# Patient Record
Sex: FEMALE | Race: WHITE | Employment: OTHER | URBAN - NONMETROPOLITAN AREA
[De-identification: names, ages, dates, MRNs, and addresses within clinical notes are randomized per-mention and may not be internally consistent; named-entity substitution may affect disease eponyms.]

---

## 2017-01-05 ENCOUNTER — TELEPHONE (OUTPATIENT)
Dept: FAMILY MEDICINE CLINIC | Age: 62
End: 2017-01-05

## 2017-01-17 DIAGNOSIS — M81.0 POSTMENOPAUSAL OSTEOPOROSIS: ICD-10-CM

## 2017-01-17 RX ORDER — ALENDRONATE SODIUM 70 MG/1
70 TABLET ORAL
Qty: 12 TABLET | Refills: 3 | Status: SHIPPED | OUTPATIENT
Start: 2017-01-17 | End: 2017-11-28 | Stop reason: ALTCHOICE

## 2017-03-06 DIAGNOSIS — E03.9 ACQUIRED HYPOTHYROIDISM: Primary | ICD-10-CM

## 2017-03-06 RX ORDER — THYROID 60 MG
TABLET ORAL
Qty: 30 TABLET | Refills: 0 | Status: SHIPPED | OUTPATIENT
Start: 2017-03-06 | End: 2017-03-27 | Stop reason: SDUPTHER

## 2017-03-08 RX ORDER — LEVOTHYROXINE AND LIOTHYRONINE 19; 4.5 UG/1; UG/1
30 TABLET ORAL EVERY OTHER DAY
Qty: 32 TABLET | Refills: 0 | Status: SHIPPED | OUTPATIENT
Start: 2017-03-08 | End: 2017-03-27 | Stop reason: SDUPTHER

## 2017-03-23 ENCOUNTER — TELEPHONE (OUTPATIENT)
Dept: FAMILY MEDICINE CLINIC | Age: 62
End: 2017-03-23

## 2017-03-27 ENCOUNTER — TELEPHONE (OUTPATIENT)
Dept: FAMILY MEDICINE CLINIC | Age: 62
End: 2017-03-27

## 2017-03-27 DIAGNOSIS — E03.9 ACQUIRED HYPOTHYROIDISM: Primary | ICD-10-CM

## 2017-03-27 RX ORDER — LEVOTHYROXINE AND LIOTHYRONINE 38; 9 UG/1; UG/1
60 TABLET ORAL EVERY OTHER DAY
Qty: 30 TABLET | Refills: 1 | Status: SHIPPED | OUTPATIENT
Start: 2017-03-27 | End: 2017-10-09 | Stop reason: SDUPTHER

## 2017-03-27 RX ORDER — LEVOTHYROXINE AND LIOTHYRONINE 19; 4.5 UG/1; UG/1
30 TABLET ORAL EVERY OTHER DAY
Qty: 32 TABLET | Refills: 1 | Status: SHIPPED | OUTPATIENT
Start: 2017-03-27 | End: 2017-10-09 | Stop reason: SDUPTHER

## 2017-05-05 ENCOUNTER — TELEPHONE (OUTPATIENT)
Dept: FAMILY MEDICINE CLINIC | Age: 62
End: 2017-05-05

## 2017-06-26 ENCOUNTER — TELEPHONE (OUTPATIENT)
Dept: FAMILY MEDICINE CLINIC | Age: 62
End: 2017-06-26

## 2017-07-05 ENCOUNTER — TELEPHONE (OUTPATIENT)
Dept: FAMILY MEDICINE CLINIC | Age: 62
End: 2017-07-05

## 2017-07-06 ENCOUNTER — TELEPHONE (OUTPATIENT)
Dept: FAMILY MEDICINE CLINIC | Age: 62
End: 2017-07-06

## 2017-07-08 DIAGNOSIS — E03.9 ACQUIRED HYPOTHYROIDISM: Primary | ICD-10-CM

## 2017-07-10 ENCOUNTER — TELEPHONE (OUTPATIENT)
Dept: FAMILY MEDICINE CLINIC | Age: 62
End: 2017-07-10

## 2017-09-07 DIAGNOSIS — E03.9 ACQUIRED HYPOTHYROIDISM: ICD-10-CM

## 2017-09-07 RX ORDER — THYROID 30 MG/1
TABLET ORAL
Qty: 30 TABLET | Refills: 1 | OUTPATIENT
Start: 2017-09-07

## 2017-09-07 RX ORDER — THYROID 60 MG
TABLET ORAL
Qty: 30 TABLET | Refills: 1 | OUTPATIENT
Start: 2017-09-07

## 2017-09-14 DIAGNOSIS — E03.9 ACQUIRED HYPOTHYROIDISM: ICD-10-CM

## 2017-09-14 RX ORDER — THYROID 30 MG/1
TABLET ORAL
Qty: 30 TABLET | Refills: 1 | OUTPATIENT
Start: 2017-09-14

## 2017-09-14 RX ORDER — THYROID 60 MG
TABLET ORAL
Qty: 30 TABLET | Refills: 1 | OUTPATIENT
Start: 2017-09-14

## 2017-09-26 ENCOUNTER — TELEPHONE (OUTPATIENT)
Dept: ADMINISTRATIVE | Age: 62
End: 2017-09-26

## 2017-10-09 DIAGNOSIS — E03.9 ACQUIRED HYPOTHYROIDISM: ICD-10-CM

## 2017-10-09 RX ORDER — THYROID 60 MG
TABLET ORAL
Qty: 30 TABLET | Refills: 1 | Status: SHIPPED | OUTPATIENT
Start: 2017-10-09 | End: 2017-11-28 | Stop reason: SDUPTHER

## 2017-10-09 RX ORDER — THYROID 30 MG/1
TABLET ORAL
Qty: 30 TABLET | Refills: 1 | Status: SHIPPED | OUTPATIENT
Start: 2017-10-09 | End: 2017-11-28 | Stop reason: SDUPTHER

## 2017-10-18 ENCOUNTER — TELEPHONE (OUTPATIENT)
Dept: FAMILY MEDICINE CLINIC | Age: 62
End: 2017-10-18

## 2017-10-18 NOTE — TELEPHONE ENCOUNTER
Patient was supposed to take 60 mg Olin thyroid on Sunday, Tuesday, Thursday and Sat, and 30 mg on MWF.  Please inform patient though that I will not be prescribing any more medications without an appt

## 2017-11-28 ENCOUNTER — HOSPITAL ENCOUNTER (OUTPATIENT)
Age: 62
Discharge: HOME | End: 2017-11-28
Payer: COMMERCIAL

## 2017-11-28 ENCOUNTER — OFFICE VISIT (OUTPATIENT)
Dept: FAMILY MEDICINE CLINIC | Age: 62
End: 2017-11-28
Payer: COMMERCIAL

## 2017-11-28 VITALS
SYSTOLIC BLOOD PRESSURE: 102 MMHG | RESPIRATION RATE: 24 BRPM | BODY MASS INDEX: 20.76 KG/M2 | WEIGHT: 137 LBS | DIASTOLIC BLOOD PRESSURE: 72 MMHG | HEIGHT: 68 IN | HEART RATE: 72 BPM | TEMPERATURE: 98 F

## 2017-11-28 DIAGNOSIS — F33.42 RECURRENT MAJOR DEPRESSIVE EPISODES, IN FULL REMISSION (HCC): ICD-10-CM

## 2017-11-28 DIAGNOSIS — E03.9 ACQUIRED HYPOTHYROIDISM: Primary | ICD-10-CM

## 2017-11-28 DIAGNOSIS — J44.9 CHRONIC OBSTRUCTIVE PULMONARY DISEASE, UNSPECIFIED COPD TYPE (HCC): ICD-10-CM

## 2017-11-28 DIAGNOSIS — G89.29 CHRONIC RIGHT HIP PAIN: ICD-10-CM

## 2017-11-28 DIAGNOSIS — M25.551 CHRONIC RIGHT HIP PAIN: ICD-10-CM
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
## 2017-11-28 DIAGNOSIS — M81.0 POSTMENOPAUSAL OSTEOPOROSIS: ICD-10-CM

## 2017-11-28 DIAGNOSIS — M35.00 SICCA SYNDROME (HCC): ICD-10-CM
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
## 2017-11-28 LAB
T4 FREE: 0.67 NG/DL (ref 0.93–1.76)
TSH SERPL DL<=0.05 MIU/L-ACNC: 4.64 UIU/ML (ref 0.4–4.2)
VITAMIN D 25-HYDROXY: 36 NG/ML (ref 30–100)

## 2017-11-28 PROCEDURE — G8926 SPIRO NO PERF OR DOC: HCPCS | Performed by: NURSE PRACTITIONER

## 2017-11-28 PROCEDURE — 4005F PHARM THX FOR OP RXD: CPT | Performed by: NURSE PRACTITIONER

## 2017-11-28 PROCEDURE — 3014F SCREEN MAMMO DOC REV: CPT | Performed by: NURSE PRACTITIONER

## 2017-11-28 PROCEDURE — 3017F COLORECTAL CA SCREEN DOC REV: CPT | Performed by: NURSE PRACTITIONER

## 2017-11-28 PROCEDURE — G8484 FLU IMMUNIZE NO ADMIN: HCPCS | Performed by: NURSE PRACTITIONER

## 2017-11-28 PROCEDURE — 84439 ASSAY OF FREE THYROXINE: CPT

## 2017-11-28 PROCEDURE — G8427 DOCREV CUR MEDS BY ELIG CLIN: HCPCS | Performed by: NURSE PRACTITIONER

## 2017-11-28 PROCEDURE — 1036F TOBACCO NON-USER: CPT | Performed by: NURSE PRACTITIONER

## 2017-11-28 PROCEDURE — 84443 ASSAY THYROID STIM HORMONE: CPT

## 2017-11-28 PROCEDURE — G8420 CALC BMI NORM PARAMETERS: HCPCS | Performed by: NURSE PRACTITIONER

## 2017-11-28 PROCEDURE — 36415 COLL VENOUS BLD VENIPUNCTURE: CPT

## 2017-11-28 PROCEDURE — 3023F SPIROM DOC REV: CPT | Performed by: NURSE PRACTITIONER

## 2017-11-28 PROCEDURE — 99214 OFFICE O/P EST MOD 30 MIN: CPT | Performed by: NURSE PRACTITIONER

## 2017-11-28 PROCEDURE — 82306 VITAMIN D 25 HYDROXY: CPT

## 2017-11-28 RX ORDER — LEVOTHYROXINE AND LIOTHYRONINE 19; 4.5 UG/1; UG/1
TABLET ORAL
Qty: 30 TABLET | Refills: 6 | Status: SHIPPED | OUTPATIENT
Start: 2017-11-28 | End: 2018-01-11 | Stop reason: SDUPTHER

## 2017-11-28 RX ORDER — LANOLIN ALCOHOL/MO/W.PET/CERES
3 CREAM (GRAM) TOPICAL NIGHTLY PRN
COMMUNITY
End: 2018-08-20

## 2017-11-28 RX ORDER — ACETAMINOPHEN 500 MG
500 TABLET ORAL PRN
COMMUNITY

## 2017-11-28 RX ORDER — LEVOTHYROXINE AND LIOTHYRONINE 38; 9 UG/1; UG/1
TABLET ORAL
Qty: 30 TABLET | Refills: 6 | Status: SHIPPED | OUTPATIENT
Start: 2017-11-28 | End: 2018-01-11 | Stop reason: SDUPTHER

## 2017-11-28 ASSESSMENT — PATIENT HEALTH QUESTIONNAIRE - PHQ9
1. LITTLE INTEREST OR PLEASURE IN DOING THINGS: 0
SUM OF ALL RESPONSES TO PHQ9 QUESTIONS 1 & 2: 0
2. FEELING DOWN, DEPRESSED OR HOPELESS: 0
SUM OF ALL RESPONSES TO PHQ QUESTIONS 1-9: 0

## 2017-11-28 ASSESSMENT — ENCOUNTER SYMPTOMS
EYES NEGATIVE: 1
GASTROINTESTINAL NEGATIVE: 1
RESPIRATORY NEGATIVE: 1

## 2017-11-29 ENCOUNTER — TELEPHONE (OUTPATIENT)
Dept: FAMILY MEDICINE CLINIC | Age: 62
End: 2017-11-29

## 2017-11-29 DIAGNOSIS — E03.9 ACQUIRED HYPOTHYROIDISM: Primary | ICD-10-CM

## 2017-11-29 NOTE — TELEPHONE ENCOUNTER
Yes I see the association with the right hip and the order is for the right hip but as you scroll down on the actual referral it states the reason for exam... Naima Prows Naima Vincent Kaur left hip pain after fall. The order may need to be changed so there's no discrepancy at the facility where she will have this done. Thank you for clearing this up with me.  I will submit for the right hip

## 2017-11-29 NOTE — TELEPHONE ENCOUNTER
----- Message from Todd Bender, DO sent at 11/29/2017  7:28 AM EST -----  Please let pt know that thyroid may be a bit underactive, but I'd like to repeat labs again in 4 wks to confirm before changing doses. Non-fasting. Let me know if questions, thanks!   Vit d level normal. Thanks

## 2017-11-29 NOTE — TELEPHONE ENCOUNTER
Patient says she was talking to someone earlier-she wants to verify that her rx for her armor that she takes 30 mg mon, weds, and Friday. On tues, thurs, Saturday, Sunday she takes 60 mg. She doesn't know if that's what she is supposed to be doing. Is this correct?

## 2017-11-29 NOTE — TELEPHONE ENCOUNTER
When I look at my office encounter, the order is associated with right hip pain and the order is for the right hip.

## 2017-11-29 NOTE — TELEPHONE ENCOUNTER
Yes, once it's been scheduled it is then linked to the referral and you can no longer make any changes. The central scheduling team in [place], [place] scheduled the pt but no worries they have been contacted and its been clear that its for the right hip and to disregard the note of the left hip.  Thank you

## 2017-12-07 ENCOUNTER — HOSPITAL ENCOUNTER (OUTPATIENT)
Dept: MRI IMAGING | Age: 62
Discharge: HOME | End: 2017-12-07
Payer: COMMERCIAL

## 2017-12-07 ENCOUNTER — TELEPHONE (OUTPATIENT)
Dept: FAMILY MEDICINE CLINIC | Age: 62
End: 2017-12-07

## 2017-12-07 DIAGNOSIS — M25.551 CHRONIC RIGHT HIP PAIN: ICD-10-CM
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
## 2017-12-07 DIAGNOSIS — G89.29 CHRONIC RIGHT HIP PAIN: ICD-10-CM
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
## 2017-12-07 PROCEDURE — 73721 MRI JNT OF LWR EXTRE W/O DYE: CPT

## 2017-12-07 NOTE — TELEPHONE ENCOUNTER
----- Message from George Doll CNP sent at 12/7/2017  3:58 PM EST -----  Let Farhat Prieto know her MRI shows degenerative changes in her hip, and some fluid in the bursa of the hip which may indicate some bursitis. Most of her pain, I suspect, is due to the arthritis and degenerative changes in the hip, but she might benefit from seeing Dr. Jacki Fernandez. Looks like she might have some bursitis and may benefit from steroid injection.

## 2018-01-08 ENCOUNTER — TELEPHONE (OUTPATIENT)
Dept: FAMILY MEDICINE CLINIC | Age: 63
End: 2018-01-08

## 2018-01-08 RX ORDER — ORPHENADRINE CITRATE 100 MG/1
100 TABLET, EXTENDED RELEASE ORAL 2 TIMES DAILY PRN
Qty: 60 TABLET | Refills: 5 | Status: SHIPPED | OUTPATIENT
Start: 2018-01-08 | End: 2018-10-12

## 2018-01-10 ENCOUNTER — HOSPITAL ENCOUNTER (OUTPATIENT)
Age: 63
Discharge: HOME | End: 2018-01-10
Payer: COMMERCIAL

## 2018-01-10 LAB
T4 FREE: 0.59 NG/DL (ref 0.93–1.76)
TSH SERPL DL<=0.05 MIU/L-ACNC: 5.66 UIU/ML (ref 0.4–4.2)

## 2018-01-10 PROCEDURE — 36415 COLL VENOUS BLD VENIPUNCTURE: CPT

## 2018-01-10 PROCEDURE — 84439 ASSAY OF FREE THYROXINE: CPT

## 2018-01-10 PROCEDURE — 84443 ASSAY THYROID STIM HORMONE: CPT

## 2018-01-11 ENCOUNTER — TELEPHONE (OUTPATIENT)
Dept: FAMILY MEDICINE CLINIC | Age: 63
End: 2018-01-11

## 2018-01-11 DIAGNOSIS — E03.9 ACQUIRED HYPOTHYROIDISM: ICD-10-CM

## 2018-01-11 RX ORDER — LEVOTHYROXINE AND LIOTHYRONINE 19; 4.5 UG/1; UG/1
TABLET ORAL
Qty: 30 TABLET | Refills: 6 | Status: SHIPPED | OUTPATIENT
Start: 2018-01-11 | End: 2018-05-29 | Stop reason: ALTCHOICE

## 2018-01-11 RX ORDER — LEVOTHYROXINE AND LIOTHYRONINE 38; 9 UG/1; UG/1
TABLET ORAL
Qty: 30 TABLET | Refills: 6 | Status: SHIPPED | OUTPATIENT
Start: 2018-01-11 | End: 2018-05-29 | Stop reason: SDUPTHER

## 2018-01-11 NOTE — TELEPHONE ENCOUNTER
Pt informed and verbalized understanding.     Lab orders faxed to [facility] per pt's request.

## 2018-02-22 ENCOUNTER — TELEPHONE (OUTPATIENT)
Dept: FAMILY MEDICINE CLINIC | Age: 63
End: 2018-02-22

## 2018-02-22 ENCOUNTER — OFFICE VISIT (OUTPATIENT)
Dept: FAMILY MEDICINE CLINIC | Age: 63
End: 2018-02-22
Payer: COMMERCIAL

## 2018-02-22 ENCOUNTER — HOSPITAL ENCOUNTER (OUTPATIENT)
Age: 63
Discharge: HOME | End: 2018-02-22
Payer: COMMERCIAL

## 2018-02-22 ENCOUNTER — HOSPITAL ENCOUNTER (OUTPATIENT)
Dept: GENERAL RADIOLOGY | Age: 63
Discharge: HOME | End: 2018-02-22
Payer: COMMERCIAL

## 2018-02-22 VITALS
RESPIRATION RATE: 18 BRPM | HEART RATE: 80 BPM | SYSTOLIC BLOOD PRESSURE: 102 MMHG | WEIGHT: 137 LBS | DIASTOLIC BLOOD PRESSURE: 78 MMHG | HEIGHT: 68 IN | BODY MASS INDEX: 20.76 KG/M2 | TEMPERATURE: 97.5 F

## 2018-02-22 DIAGNOSIS — J44.1 COPD EXACERBATION (HCC): ICD-10-CM

## 2018-02-22 DIAGNOSIS — J44.1 COPD EXACERBATION (HCC): Primary | ICD-10-CM

## 2018-02-22 PROCEDURE — 71046 X-RAY EXAM CHEST 2 VIEWS: CPT

## 2018-02-22 PROCEDURE — 3017F COLORECTAL CA SCREEN DOC REV: CPT | Performed by: NURSE PRACTITIONER

## 2018-02-22 PROCEDURE — 3014F SCREEN MAMMO DOC REV: CPT | Performed by: NURSE PRACTITIONER

## 2018-02-22 PROCEDURE — 3023F SPIROM DOC REV: CPT | Performed by: NURSE PRACTITIONER

## 2018-02-22 PROCEDURE — 1036F TOBACCO NON-USER: CPT | Performed by: NURSE PRACTITIONER

## 2018-02-22 PROCEDURE — G8484 FLU IMMUNIZE NO ADMIN: HCPCS | Performed by: NURSE PRACTITIONER

## 2018-02-22 PROCEDURE — G8926 SPIRO NO PERF OR DOC: HCPCS | Performed by: NURSE PRACTITIONER

## 2018-02-22 PROCEDURE — G8420 CALC BMI NORM PARAMETERS: HCPCS | Performed by: NURSE PRACTITIONER

## 2018-02-22 PROCEDURE — 99213 OFFICE O/P EST LOW 20 MIN: CPT | Performed by: NURSE PRACTITIONER

## 2018-02-22 PROCEDURE — G8427 DOCREV CUR MEDS BY ELIG CLIN: HCPCS | Performed by: NURSE PRACTITIONER

## 2018-02-22 RX ORDER — PREDNISONE 20 MG/1
40 TABLET ORAL DAILY
Qty: 6 TABLET | Refills: 0 | Status: SHIPPED | OUTPATIENT
Start: 2018-02-22 | End: 2018-02-25

## 2018-02-22 RX ORDER — DOXYCYCLINE HYCLATE 100 MG
100 TABLET ORAL 2 TIMES DAILY
Qty: 20 TABLET | Refills: 0 | Status: SHIPPED | OUTPATIENT
Start: 2018-02-22 | End: 2018-03-04

## 2018-03-01 ENCOUNTER — TELEPHONE (OUTPATIENT)
Dept: FAMILY MEDICINE CLINIC | Age: 63
End: 2018-03-01

## 2018-04-19 ENCOUNTER — TELEPHONE (OUTPATIENT)
Dept: FAMILY MEDICINE CLINIC | Age: 63
End: 2018-04-19

## 2018-05-04 ENCOUNTER — TELEPHONE (OUTPATIENT)
Dept: FAMILY MEDICINE CLINIC | Age: 63
End: 2018-05-04

## 2018-05-26 ENCOUNTER — HOSPITAL ENCOUNTER (OUTPATIENT)
Age: 63
Discharge: HOME | End: 2018-05-26
Payer: COMMERCIAL

## 2018-05-26 DIAGNOSIS — E03.9 ACQUIRED HYPOTHYROIDISM: ICD-10-CM

## 2018-05-26 LAB
T4 FREE: 0.6 NG/DL (ref 0.93–1.76)
TSH SERPL DL<=0.05 MIU/L-ACNC: 4.67 UIU/ML (ref 0.4–4.2)

## 2018-05-26 PROCEDURE — 36415 COLL VENOUS BLD VENIPUNCTURE: CPT

## 2018-05-26 PROCEDURE — 84443 ASSAY THYROID STIM HORMONE: CPT

## 2018-05-26 PROCEDURE — 84439 ASSAY OF FREE THYROXINE: CPT

## 2018-05-29 ENCOUNTER — TELEPHONE (OUTPATIENT)
Dept: FAMILY MEDICINE CLINIC | Age: 63
End: 2018-05-29

## 2018-05-29 DIAGNOSIS — E55.9 VITAMIN D DEFICIENCY: Primary | ICD-10-CM

## 2018-05-29 DIAGNOSIS — E03.9 ACQUIRED HYPOTHYROIDISM: Primary | ICD-10-CM

## 2018-05-29 DIAGNOSIS — Z11.59 NEED FOR HEPATITIS C SCREENING TEST: ICD-10-CM

## 2018-05-29 RX ORDER — LEVOTHYROXINE AND LIOTHYRONINE 38; 9 UG/1; UG/1
TABLET ORAL
Qty: 30 TABLET | Refills: 6 | Status: SHIPPED | OUTPATIENT
Start: 2018-05-29 | End: 2019-03-04 | Stop reason: ALTCHOICE

## 2018-08-15 ENCOUNTER — TELEPHONE (OUTPATIENT)
Dept: FAMILY MEDICINE CLINIC | Age: 63
End: 2018-08-15

## 2018-08-16 NOTE — TELEPHONE ENCOUNTER
If she still has an issue or concern, I'd be happy to f/u with her. However, if she's doing well and is improved, then not necessarily.

## 2018-08-20 ENCOUNTER — TELEPHONE (OUTPATIENT)
Dept: FAMILY MEDICINE CLINIC | Age: 63
End: 2018-08-20

## 2018-08-20 ENCOUNTER — OFFICE VISIT (OUTPATIENT)
Dept: FAMILY MEDICINE CLINIC | Age: 63
End: 2018-08-20
Payer: COMMERCIAL

## 2018-08-20 VITALS
HEART RATE: 78 BPM | DIASTOLIC BLOOD PRESSURE: 70 MMHG | SYSTOLIC BLOOD PRESSURE: 110 MMHG | WEIGHT: 143 LBS | HEIGHT: 68 IN | OXYGEN SATURATION: 91 % | BODY MASS INDEX: 21.67 KG/M2 | TEMPERATURE: 98.1 F | RESPIRATION RATE: 16 BRPM

## 2018-08-20 DIAGNOSIS — R07.89 RIGHT-SIDED CHEST WALL PAIN: Primary | ICD-10-CM

## 2018-08-20 DIAGNOSIS — J44.9 CHRONIC OBSTRUCTIVE PULMONARY DISEASE, UNSPECIFIED COPD TYPE (HCC): ICD-10-CM

## 2018-08-20 DIAGNOSIS — R06.02 SHORTNESS OF BREATH: ICD-10-CM

## 2018-08-20 PROCEDURE — 99213 OFFICE O/P EST LOW 20 MIN: CPT | Performed by: NURSE PRACTITIONER

## 2018-08-20 RX ORDER — NAPROXEN SODIUM 220 MG
220 TABLET ORAL PRN
COMMUNITY
End: 2018-10-12

## 2018-08-20 NOTE — PROGRESS NOTES
as needed for Pain      thyroid (ARMOUR THYROID) 60 MG tablet Take 1 tablet by mouth daily 30 tablet 6    levalbuterol (XOPENEX HFA) 45 MCG/ACT inhaler INHALE 2 PUFFS INTO THE LUNGS EVERY 6 HOURS AS NEEDED FOR WHEEZING 15 g 3    orphenadrine (NORFLEX) 100 MG extended release tablet Take 1 tablet by mouth 2 times daily as needed for Muscle spasms 60 tablet 5    acetaminophen (TYLENOL) 500 MG tablet Take 500 mg by mouth as needed for Pain      Menthol, Topical Analgesic, (PERFORM PAIN RELIEVING ROLL-ON EX) Apply topically as needed from the makers of Biofreeze (Menthol 3.5%)      vitamin D (CHOLECALCIFEROL) 1000 UNITS TABS tablet Take 1 tablet by mouth daily (Patient taking differently: Take 4,000 Units by mouth daily Vitamin D3) 30 tablet 5     No current facility-administered medications for this visit.         Past Medical History:   Diagnosis Date    Arthritis     Bradycardia     Cancer (HCC)     cervical    Chronic fatigue syndrome     COPD (chronic obstructive pulmonary disease) (HCC)     Degenerative cervical disc     Depression     Fatigue     Fibromyalgia     Headache     Hiatal hernia     Hyperlipidemia     Postmenopausal osteoporosis 1/17/2017    Prolonged emergence from general anesthesia     Thoracic degenerative disc disease     Thoracic disc herniation     Thyroid disease        Past Surgical History:   Procedure Laterality Date    APPENDECTOMY  05/2017    CERVIX SURGERY      COLONOSCOPY      HERNIA REPAIR      UPPER GASTROINTESTINAL ENDOSCOPY  3/10/16       Social History   Substance Use Topics    Smoking status: Passive Smoke Exposure - Never Smoker    Smokeless tobacco: Never Used    Alcohol use 0.0 oz/week      Comment: rare at holidays       Family History   Problem Relation Age of Onset    Heart Disease Mother     Heart Disease Father     Other Sister         Torres's Esophagus    Other Other         Growth of esophagus    Diabetes Brother     Other Other

## 2018-08-21 ENCOUNTER — HOSPITAL ENCOUNTER (OUTPATIENT)
Age: 63
Discharge: HOME | End: 2018-08-21
Payer: COMMERCIAL

## 2018-08-21 DIAGNOSIS — E03.9 ACQUIRED HYPOTHYROIDISM: ICD-10-CM

## 2018-08-21 DIAGNOSIS — Z11.59 NEED FOR HEPATITIS C SCREENING TEST: ICD-10-CM

## 2018-08-21 DIAGNOSIS — E55.9 VITAMIN D DEFICIENCY: ICD-10-CM

## 2018-08-21 LAB
HEPATITIS C ANTIBODY: NEGATIVE
TSH SERPL DL<=0.05 MIU/L-ACNC: 1.06 UIU/ML (ref 0.4–4.2)
VITAMIN D 25-HYDROXY: 33 NG/ML (ref 30–100)

## 2018-08-21 PROCEDURE — 36415 COLL VENOUS BLD VENIPUNCTURE: CPT

## 2018-08-21 PROCEDURE — 86803 HEPATITIS C AB TEST: CPT

## 2018-08-21 PROCEDURE — 82306 VITAMIN D 25 HYDROXY: CPT

## 2018-08-21 PROCEDURE — 84443 ASSAY THYROID STIM HORMONE: CPT

## 2018-08-22 ENCOUNTER — TELEPHONE (OUTPATIENT)
Dept: FAMILY MEDICINE CLINIC | Age: 63
End: 2018-08-22

## 2018-08-22 DIAGNOSIS — E03.9 ACQUIRED HYPOTHYROIDISM: Primary | ICD-10-CM

## 2018-08-22 NOTE — TELEPHONE ENCOUNTER
LMOM requesting pt to call back at earliest convenience.   (unable to leave msg on mach per signed HIPAA)

## 2018-08-29 ENCOUNTER — HOSPITAL ENCOUNTER (OUTPATIENT)
Dept: PULMONOLOGY | Age: 63
Discharge: HOME | End: 2018-08-29
Payer: COMMERCIAL

## 2018-08-29 ENCOUNTER — TELEPHONE (OUTPATIENT)
Dept: FAMILY MEDICINE CLINIC | Age: 63
End: 2018-08-29

## 2018-08-29 DIAGNOSIS — J44.9 CHRONIC OBSTRUCTIVE PULMONARY DISEASE, UNSPECIFIED COPD TYPE (HCC): ICD-10-CM

## 2018-08-29 PROCEDURE — 94726 PLETHYSMOGRAPHY LUNG VOLUMES: CPT

## 2018-08-29 PROCEDURE — 94060 EVALUATION OF WHEEZING: CPT

## 2018-08-29 PROCEDURE — 94729 DIFFUSING CAPACITY: CPT

## 2018-09-05 ENCOUNTER — HOSPITAL ENCOUNTER (OUTPATIENT)
Dept: NON INVASIVE DIAGNOSTICS | Age: 63
Discharge: HOME | End: 2018-09-05
Payer: COMMERCIAL

## 2018-09-05 ENCOUNTER — TELEPHONE (OUTPATIENT)
Dept: FAMILY MEDICINE CLINIC | Age: 63
End: 2018-09-05

## 2018-09-05 VITALS — BODY MASS INDEX: 21.77 KG/M2 | HEIGHT: 69 IN | WEIGHT: 147 LBS

## 2018-09-05 DIAGNOSIS — R06.02 SHORTNESS OF BREATH: ICD-10-CM

## 2018-09-05 PROCEDURE — 93017 CV STRESS TEST TRACING ONLY: CPT

## 2018-09-05 NOTE — TELEPHONE ENCOUNTER
----- Message from LAURIE Vann - CNP sent at 9/5/2018  3:32 PM EDT -----  Let Anjelica Muhammad know her PFT's actually improved since her last set! They still do show some moderate inability of oxygen exchange between the lung cells in the lungs, this is called diffusion defect and indicates that the lung cells, or alveoli ([id number]) have lost some function. At this point, I'd recommend she continue using her inhaler as needed. F/u as scheduled. Let me know if any questions!

## 2018-09-06 ENCOUNTER — TELEPHONE (OUTPATIENT)
Dept: FAMILY MEDICINE CLINIC | Age: 63
End: 2018-09-06

## 2018-09-06 DIAGNOSIS — R94.31 NONSPECIFIC ST-T WAVE ELECTROCARDIOGRAPHIC CHANGES: ICD-10-CM

## 2018-09-06 DIAGNOSIS — R06.02 SHORTNESS OF BREATH: Primary | ICD-10-CM

## 2018-09-06 NOTE — TELEPHONE ENCOUNTER
----- Message from LAURIE Oconnor CNP sent at 9/6/2018  8:35 AM EDT -----  Let Vince Matson know her stress test was negative, didn't show any signs of ischemia. But the cardiologist did recommend doing a chemical stress test because she has had EKG changes and her exercise tolerance was poor during the treadmill stress test, so they weren't able to really get a good response from her. I know she doesn't want to do the chemical stress test because she doesn't want these chemicals in her body, but I don't feel like we have an option here. In order to really get a good idea of what's going on with her heart and SOB, a chemical stress test would be the best. Please advise.

## 2018-09-06 NOTE — TELEPHONE ENCOUNTER
Pt notified and is  Willing to have the chemical stress test , pt is wondering if she should start getting set up with a cardiologist as a referral ?

## 2018-09-07 ENCOUNTER — TELEPHONE (OUTPATIENT)
Dept: FAMILY MEDICINE CLINIC | Age: 63
End: 2018-09-07

## 2018-09-07 NOTE — TELEPHONE ENCOUNTER
LMOMM for pt to call back at earliest convenience     pennie stress test   09/19/18 @ 8:30 am   [facility]     NPO for 6 hours  Except H2O  And B/p medication   No caffeine  Chocolate , tea ,coffee ,decaf  For 24 hours prior   Take B/p meds that am   Bring inhalers if  You have them

## 2018-09-13 ENCOUNTER — TELEPHONE (OUTPATIENT)
Dept: FAMILY MEDICINE CLINIC | Age: 63
End: 2018-09-13

## 2018-09-13 NOTE — TELEPHONE ENCOUNTER
Patient has a follow up appointment scheduled for 9/17/18. She's asking if she should keep that or wait until after her stress test on 9/28/18? Please advise. Late afternoon is the best time to reach her for a call back, if no answer she stated it's ok to leave a message.

## 2018-09-14 ENCOUNTER — TELEPHONE (OUTPATIENT)
Dept: FAMILY MEDICINE CLINIC | Age: 63
End: 2018-09-14

## 2018-09-14 DIAGNOSIS — R53.82 CHRONIC FATIGUE: ICD-10-CM

## 2018-09-14 DIAGNOSIS — Z13.220 SCREENING FOR HYPERLIPIDEMIA: ICD-10-CM

## 2018-09-14 DIAGNOSIS — Z13.1 SCREENING FOR DIABETES MELLITUS (DM): Primary | ICD-10-CM

## 2018-09-14 NOTE — TELEPHONE ENCOUNTER
Patient is due to see Khushbu Oconnor on 10/4/18. She is asking if she can get a lab order to have her A1c checked because she has been having increased fatigue and hunger. She was also asking if she should have her cholesterol checked because she does not think it has been checked a few years. She would use a [facility] lab, please call her to advise.

## 2018-09-17 NOTE — TELEPHONE ENCOUNTER
Attempted to contact the pt, per pt's HIPAA form no detailed msg could be left so a VM asking the pt to please return our call was left.

## 2018-09-24 ENCOUNTER — HOSPITAL ENCOUNTER (OUTPATIENT)
Age: 63
Discharge: HOME | End: 2018-09-24
Payer: COMMERCIAL

## 2018-09-24 LAB
AVERAGE GLUCOSE: 111 MG/DL (ref 70–126)
BASOPHILS # BLD: 0.5 %
BASOPHILS ABSOLUTE: 0 THOU/MM3 (ref 0–0.1)
CHOLESTEROL, TOTAL: 231 MG/DL (ref 100–199)
EOSINOPHIL # BLD: 8.2 %
EOSINOPHILS ABSOLUTE: 0.5 THOU/MM3 (ref 0–0.4)
ERYTHROCYTE [DISTWIDTH] IN BLOOD BY AUTOMATED COUNT: 12.1 % (ref 11.5–14.5)
ERYTHROCYTE [DISTWIDTH] IN BLOOD BY AUTOMATED COUNT: 41.2 FL (ref 35–45)
HBA1C MFR BLD: 5.7 % (ref 4.4–6.4)
HCT VFR BLD CALC: 40.6 % (ref 37–47)
HDLC SERPL-MCNC: 55 MG/DL
HEMOGLOBIN: 13.2 GM/DL (ref 12–16)
IMMATURE GRANS (ABS): 0.02 THOU/MM3 (ref 0–0.07)
IMMATURE GRANULOCYTES: 0.4 %
LDL CHOLESTEROL CALCULATED: 153 MG/DL
LYMPHOCYTES # BLD: 31.2 %
LYMPHOCYTES ABSOLUTE: 1.7 THOU/MM3 (ref 1–4.8)
MCH RBC QN AUTO: 30 PG (ref 26–33)
MCHC RBC AUTO-ENTMCNC: 32.5 GM/DL (ref 32.2–35.5)
MCV RBC AUTO: 92.3 FL (ref 81–99)
MONOCYTES # BLD: 9.5 %
MONOCYTES ABSOLUTE: 0.5 THOU/MM3 (ref 0.4–1.3)
NUCLEATED RED BLOOD CELLS: 0 /100 WBC
PLATELET # BLD: 289 THOU/MM3 (ref 130–400)
PMV BLD AUTO: 10.9 FL (ref 9.4–12.4)
RBC # BLD: 4.4 MILL/MM3 (ref 4.2–5.4)
SEG NEUTROPHILS: 50.2 %
SEGMENTED NEUTROPHILS ABSOLUTE COUNT: 2.8 THOU/MM3 (ref 1.8–7.7)
TRIGL SERPL-MCNC: 114 MG/DL (ref 0–199)
WBC # BLD: 5.5 THOU/MM3 (ref 4.8–10.8)

## 2018-09-24 PROCEDURE — 85025 COMPLETE CBC W/AUTO DIFF WBC: CPT

## 2018-09-24 PROCEDURE — 83036 HEMOGLOBIN GLYCOSYLATED A1C: CPT

## 2018-09-24 PROCEDURE — 36415 COLL VENOUS BLD VENIPUNCTURE: CPT

## 2018-09-24 PROCEDURE — 80061 LIPID PANEL: CPT

## 2018-09-25 ENCOUNTER — TELEPHONE (OUTPATIENT)
Dept: FAMILY MEDICINE CLINIC | Age: 63
End: 2018-09-25

## 2018-10-12 ENCOUNTER — HOSPITAL ENCOUNTER (OUTPATIENT)
Dept: NON INVASIVE DIAGNOSTICS | Age: 63
Discharge: HOME | End: 2018-10-12
Payer: COMMERCIAL

## 2018-10-12 VITALS — WEIGHT: 150 LBS | BODY MASS INDEX: 21.47 KG/M2 | HEIGHT: 70 IN

## 2018-10-12 DIAGNOSIS — R06.02 SHORTNESS OF BREATH: ICD-10-CM

## 2018-10-12 DIAGNOSIS — R94.31 NONSPECIFIC ST-T WAVE ELECTROCARDIOGRAPHIC CHANGES: ICD-10-CM

## 2018-10-12 PROCEDURE — 6360000002 HC RX W HCPCS

## 2018-10-12 PROCEDURE — 2709999900 HC NON-CHARGEABLE SUPPLY

## 2018-10-12 PROCEDURE — 93017 CV STRESS TEST TRACING ONLY: CPT | Performed by: NUCLEAR MEDICINE

## 2018-10-12 PROCEDURE — A9500 TC99M SESTAMIBI: HCPCS | Performed by: NUCLEAR MEDICINE

## 2018-10-12 PROCEDURE — 78452 HT MUSCLE IMAGE SPECT MULT: CPT

## 2018-10-12 PROCEDURE — 3430000000 HC RX DIAGNOSTIC RADIOPHARMACEUTICAL: Performed by: NUCLEAR MEDICINE

## 2018-10-12 RX ADMIN — Medication 9.9 MILLICURIE: at 13:27

## 2018-10-12 RX ADMIN — Medication 32.2 MILLICURIE: at 14:41

## 2018-10-15 ENCOUNTER — TELEPHONE (OUTPATIENT)
Dept: FAMILY MEDICINE CLINIC | Age: 63
End: 2018-10-15

## 2018-10-18 ENCOUNTER — OFFICE VISIT (OUTPATIENT)
Dept: FAMILY MEDICINE CLINIC | Age: 63
End: 2018-10-18
Payer: COMMERCIAL

## 2018-10-18 VITALS
BODY MASS INDEX: 21.49 KG/M2 | SYSTOLIC BLOOD PRESSURE: 108 MMHG | HEART RATE: 68 BPM | WEIGHT: 141.8 LBS | TEMPERATURE: 97.9 F | RESPIRATION RATE: 16 BRPM | HEIGHT: 68 IN | DIASTOLIC BLOOD PRESSURE: 62 MMHG

## 2018-10-18 DIAGNOSIS — R53.82 CHRONIC FATIGUE: ICD-10-CM

## 2018-10-18 DIAGNOSIS — E03.9 ACQUIRED HYPOTHYROIDISM: Primary | ICD-10-CM

## 2018-10-18 DIAGNOSIS — M54.2 CHRONIC NECK AND BACK PAIN: ICD-10-CM

## 2018-10-18 DIAGNOSIS — R06.02 SHORTNESS OF BREATH: ICD-10-CM

## 2018-10-18 DIAGNOSIS — M54.9 CHRONIC NECK AND BACK PAIN: ICD-10-CM

## 2018-10-18 DIAGNOSIS — G89.29 CHRONIC NECK AND BACK PAIN: ICD-10-CM

## 2018-10-18 PROCEDURE — G8484 FLU IMMUNIZE NO ADMIN: HCPCS | Performed by: NURSE PRACTITIONER

## 2018-10-18 PROCEDURE — 1036F TOBACCO NON-USER: CPT | Performed by: NURSE PRACTITIONER

## 2018-10-18 PROCEDURE — G8427 DOCREV CUR MEDS BY ELIG CLIN: HCPCS | Performed by: NURSE PRACTITIONER

## 2018-10-18 PROCEDURE — G8420 CALC BMI NORM PARAMETERS: HCPCS | Performed by: NURSE PRACTITIONER

## 2018-10-18 PROCEDURE — 99214 OFFICE O/P EST MOD 30 MIN: CPT | Performed by: NURSE PRACTITIONER

## 2018-10-18 PROCEDURE — 3017F COLORECTAL CA SCREEN DOC REV: CPT | Performed by: NURSE PRACTITIONER

## 2018-10-18 ASSESSMENT — ENCOUNTER SYMPTOMS
CONSTIPATION: 0
VOMITING: 0
EYES NEGATIVE: 1
ABDOMINAL PAIN: 0
SPUTUM PRODUCTION: 0
ORTHOPNEA: 0
BACK PAIN: 1
BLURRED VISION: 0
HEARTBURN: 0
STRIDOR: 0
EYE REDNESS: 0
SHORTNESS OF BREATH: 1
DIARRHEA: 0
HEMOPTYSIS: 0
GASTROINTESTINAL NEGATIVE: 1
WHEEZING: 0
COUGH: 0
NAUSEA: 0
DOUBLE VISION: 0
EYE DISCHARGE: 0
SORE THROAT: 0
PHOTOPHOBIA: 0
EYE PAIN: 0
SINUS PAIN: 0
BLOOD IN STOOL: 0

## 2018-10-18 NOTE — PROGRESS NOTES
them to her next appointment. Barriers to learning and self management: none    Discussed use, benefit, and side effects of prescribed medications. Barriers to medication compliance addressed. All patient questions answered. Pt voiced understanding.

## 2018-10-23 ENCOUNTER — TELEPHONE (OUTPATIENT)
Dept: FAMILY MEDICINE CLINIC | Age: 63
End: 2018-10-23

## 2018-10-23 ENCOUNTER — TELEPHONE (OUTPATIENT)
Dept: CARDIOLOGY CLINIC | Age: 63
End: 2018-10-23

## 2018-10-23 NOTE — TELEPHONE ENCOUNTER
Pt called back. Informed that she will be contacted by Dr Milena Sheehan office for scheduling. Given number to Dr Milena Sheehan office.

## 2018-11-01 ENCOUNTER — OFFICE VISIT (OUTPATIENT)
Dept: FAMILY MEDICINE CLINIC | Age: 63
End: 2018-11-01
Payer: COMMERCIAL

## 2018-11-01 VITALS
BODY MASS INDEX: 20.33 KG/M2 | HEART RATE: 96 BPM | SYSTOLIC BLOOD PRESSURE: 126 MMHG | HEIGHT: 70 IN | WEIGHT: 142 LBS | DIASTOLIC BLOOD PRESSURE: 80 MMHG

## 2018-11-01 DIAGNOSIS — G89.29 CHRONIC NECK AND BACK PAIN: ICD-10-CM

## 2018-11-01 DIAGNOSIS — M16.12 PRIMARY OSTEOARTHRITIS OF LEFT HIP: ICD-10-CM

## 2018-11-01 DIAGNOSIS — M54.9 CHRONIC NECK AND BACK PAIN: ICD-10-CM

## 2018-11-01 DIAGNOSIS — M25.552 LEFT HIP PAIN: Primary | ICD-10-CM

## 2018-11-01 DIAGNOSIS — M54.2 CHRONIC NECK AND BACK PAIN: ICD-10-CM

## 2018-11-01 PROCEDURE — G8427 DOCREV CUR MEDS BY ELIG CLIN: HCPCS | Performed by: FAMILY MEDICINE

## 2018-11-01 PROCEDURE — 99214 OFFICE O/P EST MOD 30 MIN: CPT | Performed by: FAMILY MEDICINE

## 2018-11-01 PROCEDURE — G8420 CALC BMI NORM PARAMETERS: HCPCS | Performed by: FAMILY MEDICINE

## 2018-11-01 PROCEDURE — G8484 FLU IMMUNIZE NO ADMIN: HCPCS | Performed by: FAMILY MEDICINE

## 2018-11-01 PROCEDURE — 1036F TOBACCO NON-USER: CPT | Performed by: FAMILY MEDICINE

## 2018-11-01 PROCEDURE — 3017F COLORECTAL CA SCREEN DOC REV: CPT | Performed by: FAMILY MEDICINE

## 2018-11-01 ASSESSMENT — ENCOUNTER SYMPTOMS: BACK PAIN: 1

## 2018-11-02 ENCOUNTER — OFFICE VISIT (OUTPATIENT)
Dept: CARDIOLOGY CLINIC | Age: 63
End: 2018-11-02
Payer: COMMERCIAL

## 2018-11-02 VITALS
SYSTOLIC BLOOD PRESSURE: 118 MMHG | WEIGHT: 142.25 LBS | HEART RATE: 72 BPM | BODY MASS INDEX: 21.56 KG/M2 | DIASTOLIC BLOOD PRESSURE: 71 MMHG | HEIGHT: 68 IN

## 2018-11-02 DIAGNOSIS — R06.02 SOB (SHORTNESS OF BREATH) ON EXERTION: Primary | ICD-10-CM

## 2018-11-02 DIAGNOSIS — R94.31 ABNORMAL EKG: ICD-10-CM

## 2018-11-02 DIAGNOSIS — E78.00 PURE HYPERCHOLESTEROLEMIA: ICD-10-CM

## 2018-11-02 DIAGNOSIS — Z87.898 HISTORY OF PALPITATIONS: ICD-10-CM

## 2018-11-02 DIAGNOSIS — I73.9 INTERMITTENT CLAUDICATION (HCC): ICD-10-CM

## 2018-11-02 PROCEDURE — 99204 OFFICE O/P NEW MOD 45 MIN: CPT | Performed by: INTERNAL MEDICINE

## 2018-11-02 PROCEDURE — 93000 ELECTROCARDIOGRAM COMPLETE: CPT | Performed by: INTERNAL MEDICINE

## 2018-11-02 PROCEDURE — G8484 FLU IMMUNIZE NO ADMIN: HCPCS | Performed by: INTERNAL MEDICINE

## 2018-11-02 PROCEDURE — G8427 DOCREV CUR MEDS BY ELIG CLIN: HCPCS | Performed by: INTERNAL MEDICINE

## 2018-11-02 PROCEDURE — 3017F COLORECTAL CA SCREEN DOC REV: CPT | Performed by: INTERNAL MEDICINE

## 2018-11-02 PROCEDURE — G8420 CALC BMI NORM PARAMETERS: HCPCS | Performed by: INTERNAL MEDICINE

## 2018-11-02 PROCEDURE — 1036F TOBACCO NON-USER: CPT | Performed by: INTERNAL MEDICINE

## 2018-11-02 RX ORDER — ATORVASTATIN CALCIUM 20 MG/1
20 TABLET, FILM COATED ORAL DAILY
COMMUNITY
End: 2018-11-02 | Stop reason: SDUPTHER

## 2018-11-02 NOTE — PROGRESS NOTES
Occupational History    Not on file. Social History Main Topics    Smoking status: Passive Smoke Exposure - Never Smoker    Smokeless tobacco: Never Used    Alcohol use 0.0 oz/week      Comment: rare at holidays    Drug use: No    Sexual activity: Not on file     Other Topics Concern    Not on file     Social History Narrative    No narrative on file       Current Outpatient Prescriptions   Medication Sig Dispense Refill    atorvastatin (LIPITOR) 20 MG tablet Take 20 mg by mouth daily      NONFORMULARY Take 2 capsules by mouth daily OMEGA Q plus Resiverol from Dr. Beryle Dials thyroid (ARMOUR THYROID) 60 MG tablet Take 1 tablet by mouth daily 30 tablet 6    levalbuterol (XOPENEX HFA) 45 MCG/ACT inhaler INHALE 2 PUFFS INTO THE LUNGS EVERY 6 HOURS AS NEEDED FOR WHEEZING 15 g 3    acetaminophen (TYLENOL) 500 MG tablet Take 500 mg by mouth as needed for Pain      Menthol, Topical Analgesic, (PERFORM PAIN RELIEVING ROLL-ON EX) Apply topically as needed from the makers of Biofreeze (Menthol 3.5%)      vitamin D (CHOLECALCIFEROL) 1000 UNITS TABS tablet Take 1 tablet by mouth daily (Patient taking differently: Take 4,000 Units by mouth daily Vitamin D3) 30 tablet 5     No current facility-administered medications for this visit. Review of Systems -     General ROS: negative  Psychological ROS: negative  Hematological and Lymphatic ROS: No history of blood clots or bleeding disorder. Respiratory ROS: no cough, shortness of breath, or wheezing  Cardiovascular ROS: no chest pain or dyspnea on exertion  Gastrointestinal ROS: negative  Genito-Urinary ROS: no dysuria, trouble voiding, or hematuria  Musculoskeletal ROS: negative  Neurological ROS: no TIA or stroke symptoms  Dermatological ROS: negative      Blood pressure 118/71, pulse 72, height 5' 8\" (1.727 m), weight 142 lb 4 oz (64.5 kg), not currently breastfeeding.         Physical Examination:    General appearance - alert, well appearing, and function test before next appointment    RTC in 3 weeks    Lorene Gale [facility]

## 2018-11-04 RX ORDER — ATORVASTATIN CALCIUM 20 MG/1
20 TABLET, FILM COATED ORAL DAILY
Qty: 90 TABLET | Refills: 1 | Status: SHIPPED | OUTPATIENT
Start: 2018-11-04 | End: 2019-03-04 | Stop reason: ALTCHOICE

## 2018-11-05 ENCOUNTER — TELEPHONE (OUTPATIENT)
Dept: CARDIOLOGY CLINIC | Age: 63
End: 2018-11-05

## 2018-11-07 ENCOUNTER — TELEPHONE (OUTPATIENT)
Dept: CARDIOLOGY CLINIC | Age: 63
End: 2018-11-07

## 2018-11-07 NOTE — TELEPHONE ENCOUNTER
PATIENT IS SCHEDULED FOR SEGMENTAL PRESSURE ON 11-9-18 @ 11 AM.  PATIENT NEEDS TO ARRIVE AT 1030 AM AT [address] [facility]. THERE ARE NO PREPS FOR THIS TEST AND SHE MUST TAKE HER BP MEDS BEFORE THE TEST. LM FOR PATIENT TO CALL OUR OFFICE.

## 2018-11-09 ENCOUNTER — HOSPITAL ENCOUNTER (OUTPATIENT)
Dept: INTERVENTIONAL RADIOLOGY/VASCULAR | Age: 63
Discharge: HOME | End: 2018-11-09
Payer: COMMERCIAL

## 2018-11-09 DIAGNOSIS — R94.31 ABNORMAL EKG: ICD-10-CM

## 2018-11-09 DIAGNOSIS — I73.9 INTERMITTENT CLAUDICATION (HCC): ICD-10-CM

## 2018-11-09 DIAGNOSIS — E78.00 PURE HYPERCHOLESTEROLEMIA: ICD-10-CM

## 2018-11-09 DIAGNOSIS — Z87.898 HISTORY OF PALPITATIONS: ICD-10-CM

## 2018-11-09 DIAGNOSIS — R06.02 SOB (SHORTNESS OF BREATH) ON EXERTION: ICD-10-CM

## 2018-11-09 PROCEDURE — 93923 UPR/LXTR ART STDY 3+ LVLS: CPT

## 2018-11-12 ENCOUNTER — HOSPITAL ENCOUNTER (OUTPATIENT)
Dept: NON INVASIVE DIAGNOSTICS | Age: 63
Discharge: HOME | End: 2018-11-12
Payer: COMMERCIAL

## 2018-11-12 DIAGNOSIS — Z87.898 HISTORY OF PALPITATIONS: ICD-10-CM

## 2018-11-12 DIAGNOSIS — E78.00 PURE HYPERCHOLESTEROLEMIA: ICD-10-CM

## 2018-11-12 DIAGNOSIS — R06.02 SOB (SHORTNESS OF BREATH) ON EXERTION: ICD-10-CM

## 2018-11-12 DIAGNOSIS — R94.31 ABNORMAL EKG: ICD-10-CM

## 2018-11-12 DIAGNOSIS — I73.9 INTERMITTENT CLAUDICATION (HCC): ICD-10-CM

## 2018-11-12 LAB
LV EF: 65 %
LVEF MODALITY: NORMAL

## 2018-11-12 PROCEDURE — 93225 XTRNL ECG REC<48 HRS REC: CPT

## 2018-11-12 PROCEDURE — 93226 XTRNL ECG REC<48 HR SCAN A/R: CPT

## 2018-11-12 PROCEDURE — 93306 TTE W/DOPPLER COMPLETE: CPT

## 2018-11-16 NOTE — PROCEDURES
[address] [place] [address]                                 HOLTER MONITOR    PATIENT NAME: Cate Tillman                  :        1955  MED REC NO:   [id number]                           ROOM:  ACCOUNT NO:   [de-identified]                           ADMIT DATE: 2018  PROVIDER:     Annalisa Aguilar. SYDNEY Gregory Rasp:  2018    DURATION:  48 hours. QUALITY:  Reasonable. INDICATION:  Palpitation. FINDINGS:  The patient is in normal sinus rhythm with average heart rate  of 72 beats per minute, ranging from 47 to 122 beats per minute. Maximum R to R interval was 1.6 seconds at 10 a.m. There are 295  ventricular ectopic beats of which all of them are isolated with some  bigeminy pattern. There are 139 supraventricular ectopic beats of which  91 isolated, 7 couplets, and 3 bigeminy pattern and 7 episodes of  supraventricular ectopic run. The longest composed of 14 beats at the  rate of 131 beats per minute. The fastest composed of 3 beats at the  rate of 156 beats per minute. Diary presented and multiple entries  noted, which included shortness of breath most of the time and dizziness  at times and all those correlated with normal sinus rhythm. CONCLUSION:  1. Normal sinus rhythm. Average heart rate 72 beats per minute,  ranging from 47 to 122 beats per minute. No significant pause of more  than 1.6 seconds noted. Rare supraventricular ectopic beat, total of  139, isolated and couplet and rare supraventricular ectopic run. The  longest composed of 14 beats at the rate of 131 beats per minute. The  fastest composed of 3 beats at the rate of 156 beats per minute. 2.  Rare ventricular ectopic beats, total of 295, all isolated. No  ventricular tachycardia. No atrial fibrillation. The symptom of  shortness of breath correlates with normal sinus rhythm. Veronica Lobo.

## 2018-11-19 ENCOUNTER — OFFICE VISIT (OUTPATIENT)
Dept: PULMONOLOGY | Age: 63
End: 2018-11-19
Payer: COMMERCIAL

## 2018-11-19 VITALS
SYSTOLIC BLOOD PRESSURE: 128 MMHG | OXYGEN SATURATION: 98 % | HEART RATE: 77 BPM | HEIGHT: 68 IN | TEMPERATURE: 96.9 F | DIASTOLIC BLOOD PRESSURE: 66 MMHG | WEIGHT: 145 LBS | BODY MASS INDEX: 21.98 KG/M2

## 2018-11-19 DIAGNOSIS — J30.9 ALLERGIC RHINITIS, UNSPECIFIED SEASONALITY, UNSPECIFIED TRIGGER: ICD-10-CM

## 2018-11-19 DIAGNOSIS — J45.30 MILD PERSISTENT ASTHMA WITHOUT COMPLICATION: Primary | ICD-10-CM

## 2018-11-19 PROCEDURE — 3017F COLORECTAL CA SCREEN DOC REV: CPT | Performed by: INTERNAL MEDICINE

## 2018-11-19 PROCEDURE — G8484 FLU IMMUNIZE NO ADMIN: HCPCS | Performed by: INTERNAL MEDICINE

## 2018-11-19 PROCEDURE — 1036F TOBACCO NON-USER: CPT | Performed by: INTERNAL MEDICINE

## 2018-11-19 PROCEDURE — G8428 CUR MEDS NOT DOCUMENT: HCPCS | Performed by: INTERNAL MEDICINE

## 2018-11-19 PROCEDURE — 99204 OFFICE O/P NEW MOD 45 MIN: CPT | Performed by: INTERNAL MEDICINE

## 2018-11-19 PROCEDURE — G8420 CALC BMI NORM PARAMETERS: HCPCS | Performed by: INTERNAL MEDICINE

## 2018-11-19 RX ORDER — FLUTICASONE PROPIONATE 50 MCG
2 SPRAY, SUSPENSION (ML) NASAL DAILY
Qty: 1 BOTTLE | Refills: 11 | Status: SHIPPED | OUTPATIENT
Start: 2018-11-19 | End: 2019-11-12 | Stop reason: ALTCHOICE

## 2018-11-19 RX ORDER — LEVALBUTEROL TARTRATE 45 MCG
2 HFA AEROSOL WITH ADAPTER (GRAM) INHALATION EVERY 6 HOURS PRN
Qty: 1 INHALER | Refills: 11 | Status: SHIPPED | OUTPATIENT
Start: 2018-11-19 | End: 2019-12-13 | Stop reason: SDUPTHER

## 2018-11-28 ENCOUNTER — HOSPITAL ENCOUNTER (OUTPATIENT)
Age: 63
Discharge: HOME | End: 2018-11-28
Payer: COMMERCIAL

## 2018-11-28 DIAGNOSIS — R94.31 ABNORMAL EKG: ICD-10-CM

## 2018-11-28 DIAGNOSIS — I73.9 INTERMITTENT CLAUDICATION (HCC): ICD-10-CM

## 2018-11-28 DIAGNOSIS — R06.02 SOB (SHORTNESS OF BREATH) ON EXERTION: ICD-10-CM

## 2018-11-28 DIAGNOSIS — E78.00 PURE HYPERCHOLESTEROLEMIA: ICD-10-CM

## 2018-11-28 DIAGNOSIS — Z87.898 HISTORY OF PALPITATIONS: ICD-10-CM

## 2018-11-28 LAB
ALBUMIN SERPL-MCNC: 4.7 G/DL (ref 3.5–5.1)
ALP BLD-CCNC: 81 U/L (ref 38–126)
ALT SERPL-CCNC: 17 U/L (ref 11–66)
AST SERPL-CCNC: 21 U/L (ref 5–40)
BILIRUB SERPL-MCNC: 0.3 MG/DL (ref 0.3–1.2)
BILIRUBIN DIRECT: < 0.2 MG/DL (ref 0–0.3)
BILIRUBIN URINE: NEGATIVE
BLOOD, URINE: NEGATIVE
CALCIUM SERPL-MCNC: 9.9 MG/DL (ref 8.5–10.5)
CHARACTER, URINE: CLEAR
COLOR: YELLOW
GLUCOSE, URINE: NEGATIVE MG/DL
KETONES, URINE: NEGATIVE
LEUKOCYTE EST, POC: NEGATIVE
NITRITE, URINE: NEGATIVE
PH UA: 6
PROTEIN UA: NEGATIVE MG/DL
SPECIFIC GRAVITY UA: 1.01 (ref 1–1.03)
TOTAL PROTEIN: 7.5 G/DL (ref 6.1–8)
UROBILINOGEN, URINE: 0.2 EU/DL
VITAMIN D 25-HYDROXY: 39 NG/ML (ref 30–100)

## 2018-11-28 PROCEDURE — 86376 MICROSOMAL ANTIBODY EACH: CPT

## 2018-11-28 PROCEDURE — 83970 ASSAY OF PARATHORMONE: CPT

## 2018-11-28 PROCEDURE — 86800 THYROGLOBULIN ANTIBODY: CPT

## 2018-11-28 PROCEDURE — 82310 ASSAY OF CALCIUM: CPT

## 2018-11-28 PROCEDURE — 36415 COLL VENOUS BLD VENIPUNCTURE: CPT

## 2018-11-28 PROCEDURE — 82306 VITAMIN D 25 HYDROXY: CPT

## 2018-11-28 PROCEDURE — 84160 ASSAY OF PROTEIN ANY SOURCE: CPT

## 2018-11-28 PROCEDURE — 83883 ASSAY NEPHELOMETRY NOT SPEC: CPT

## 2018-11-28 PROCEDURE — 81003 URINALYSIS AUTO W/O SCOPE: CPT

## 2018-11-28 PROCEDURE — 84165 PROTEIN E-PHORESIS SERUM: CPT

## 2018-11-28 PROCEDURE — 80076 HEPATIC FUNCTION PANEL: CPT

## 2018-11-28 RX ORDER — BUPIVACAINE HYDROCHLORIDE 2.5 MG/ML
5 INJECTION, SOLUTION EPIDURAL; INFILTRATION; INTRACAUDAL ONCE
Status: CANCELLED | OUTPATIENT
Start: 2018-11-28 | End: 2018-11-28

## 2018-11-28 RX ORDER — METHYLPREDNISOLONE ACETATE 80 MG/ML
80 INJECTION, SUSPENSION INTRA-ARTICULAR; INTRALESIONAL; INTRAMUSCULAR; SOFT TISSUE ONCE
Status: CANCELLED | OUTPATIENT
Start: 2018-11-28 | End: 2018-11-28

## 2018-11-29 ENCOUNTER — OFFICE VISIT (OUTPATIENT)
Dept: CARDIOLOGY CLINIC | Age: 63
End: 2018-11-29
Payer: COMMERCIAL

## 2018-11-29 VITALS
SYSTOLIC BLOOD PRESSURE: 106 MMHG | HEART RATE: 75 BPM | WEIGHT: 154 LBS | BODY MASS INDEX: 23.34 KG/M2 | DIASTOLIC BLOOD PRESSURE: 68 MMHG | HEIGHT: 68 IN

## 2018-11-29 DIAGNOSIS — E78.00 PURE HYPERCHOLESTEROLEMIA: Primary | ICD-10-CM

## 2018-11-29 DIAGNOSIS — R06.02 SOB (SHORTNESS OF BREATH) ON EXERTION: ICD-10-CM

## 2018-11-29 DIAGNOSIS — R94.31 ABNORMAL EKG: ICD-10-CM

## 2018-11-29 DIAGNOSIS — Z87.898 HISTORY OF PALPITATIONS: ICD-10-CM

## 2018-11-29 LAB — PTH INTACT: 44.4 PG/ML (ref 15–65)

## 2018-11-29 PROCEDURE — G8428 CUR MEDS NOT DOCUMENT: HCPCS | Performed by: INTERNAL MEDICINE

## 2018-11-29 PROCEDURE — G8484 FLU IMMUNIZE NO ADMIN: HCPCS | Performed by: INTERNAL MEDICINE

## 2018-11-29 PROCEDURE — 3017F COLORECTAL CA SCREEN DOC REV: CPT | Performed by: INTERNAL MEDICINE

## 2018-11-29 PROCEDURE — G8420 CALC BMI NORM PARAMETERS: HCPCS | Performed by: INTERNAL MEDICINE

## 2018-11-29 PROCEDURE — 99213 OFFICE O/P EST LOW 20 MIN: CPT | Performed by: INTERNAL MEDICINE

## 2018-11-29 PROCEDURE — 1036F TOBACCO NON-USER: CPT | Performed by: INTERNAL MEDICINE

## 2018-11-29 NOTE — PROGRESS NOTES
Chief Complaint   Patient presents with    Check-Up    Shortness of Breath    Pt here ref by LEONIDES murillo in 10/8/018      Pt here for 3 week check up     Pt has not started the atorvastatin yet,     Denied cp or edema    Remain to have sob on exertion, palpitation     Sob on exertion   Abn ekg with bradycardia  Hx of chronic fatigue    Passive smoking  Nonsmoker    Denied cp    Palpitations    No Leg swelling    No dizziness    FHx    Mother had CABG at 72  Father had CHF           Patient Active Problem List   Diagnosis    Sicca syndrome (HCC)    Chronic fatigue    Acquired hypothyroidism    COPD (chronic obstructive pulmonary disease) (HCC)    Chronic neck and back pain    Major depressive disorder, recurrent episode, moderate (HCC)    Gastroesophageal reflux disease with esophagitis    Left hip pain    Postmenopausal osteoporosis    SOB (shortness of breath) on exertion    History of palpitations    Abnormal EKG    Intermittent claudication (HCC)    Pure hypercholesterolemia       Past Surgical History:   Procedure Laterality Date    APPENDECTOMY  05/2017    CERVIX SURGERY      COLONOSCOPY      HERNIA REPAIR      UPPER GASTROINTESTINAL ENDOSCOPY  3/10/16       Allergies   Allergen Reactions    Ciprofloxacin     Erythromycin      Ointment for eyes - become irritated    Hydrocodone Itching    Morphine     Percocet [Oxycodone-Acetaminophen] Itching    Robitussin Dm [Dextromethorphan-Guaifenesin]      Swelling of eyes    Sulfa Antibiotics Hives    Augmentin [Amoxicillin-Pot Clavulanate] Diarrhea and Nausea And Vomiting    Codeine Nausea And Vomiting    Pcn [Penicillins] Diarrhea and Nausea And Vomiting        Family History   Problem Relation Age of Onset    Heart Disease Mother     Heart Disease Father     Other Sister         Torres's Esophagus    Other Other         Growth of esophagus    Diabetes Brother     Other Other         Unknown but did fall and hit head Social History     Social History    Marital status:      Spouse name: N/A    Number of children: N/A    Years of education: N/A     Occupational History    Not on file. Social History Main Topics    Smoking status: Passive Smoke Exposure - Never Smoker    Smokeless tobacco: Never Used    Alcohol use 0.0 oz/week      Comment: rare at holidays    Drug use: No    Sexual activity: Not on file     Other Topics Concern    Not on file     Social History Narrative    No narrative on file       Current Outpatient Prescriptions   Medication Sig Dispense Refill    XOPENEX HFA 45 MCG/ACT inhaler INHALE 2 PUFFS INTO THE LUNGS EVERY 6 HOURS AS NEEDED FOR WHEEZING 15 g 2    beclomethasone (QVAR REDIHALER) 80 MCG/ACT AERB inhaler Inhale 2 puffs into the lungs 2 times daily 1 Inhaler 11    XOPENEX HFA 45 MCG/ACT inhaler Inhale 2 puffs into the lungs every 6 hours as needed for Wheezing or Shortness of Breath 1 Inhaler 11    fluticasone (FLONASE) 50 MCG/ACT nasal spray 2 sprays by Nasal route daily 1 Bottle 11    NONFORMULARY Take 2 capsules by mouth daily OMEGA Q plus Resiverol from Dr. Samm Cardenas thyroid (ARMOUR THYROID) 60 MG tablet Take 1 tablet by mouth daily 30 tablet 6    acetaminophen (TYLENOL) 500 MG tablet Take 500 mg by mouth as needed for Pain      Menthol, Topical Analgesic, (PERFORM PAIN RELIEVING ROLL-ON EX) Apply topically as needed from the makers of Biofreeze (Menthol 3.5%)      vitamin D (CHOLECALCIFEROL) 1000 UNITS TABS tablet Take 1 tablet by mouth daily (Patient taking differently: Take 4,000 Units by mouth daily Vitamin D3) 30 tablet 5    atorvastatin (LIPITOR) 20 MG tablet Take 1 tablet by mouth daily 90 tablet 1     No current facility-administered medications for this visit. Review of Systems -     General ROS: negative  Psychological ROS: negative  Hematological and Lymphatic ROS: No history of blood clots or bleeding disorder.    Respiratory ROS: no cough,

## 2018-11-30 LAB
THYROGLOBULIN AB: 560.5 IU/ML (ref 0–4)
THYROID PEROXIDASE ANTIBODY: 43.9 IU/ML (ref 0–9)

## 2018-12-01 LAB — KAPPA/LAMBDA FREE LIGHT CHAINS: NORMAL

## 2018-12-02 LAB — PROTEIN ELECTROPHORESIS, SERUM: NORMAL

## 2019-01-08 ENCOUNTER — TELEPHONE (OUTPATIENT)
Dept: FAMILY MEDICINE CLINIC | Age: 64
End: 2019-01-08

## 2019-01-08 DIAGNOSIS — R07.89 COSTOCHONDRAL CHEST PAIN: ICD-10-CM

## 2019-01-08 DIAGNOSIS — M54.2 CHRONIC NECK AND BACK PAIN: Primary | ICD-10-CM

## 2019-01-08 DIAGNOSIS — G89.29 CHRONIC NECK AND BACK PAIN: Primary | ICD-10-CM

## 2019-01-08 DIAGNOSIS — M54.9 CHRONIC NECK AND BACK PAIN: Primary | ICD-10-CM

## 2019-01-08 RX ORDER — LIDOCAINE 50 MG/G
1 PATCH TOPICAL DAILY PRN
Qty: 30 PATCH | Refills: 3 | Status: SHIPPED | OUTPATIENT
Start: 2019-01-08

## 2019-01-09 ENCOUNTER — OFFICE VISIT (OUTPATIENT)
Dept: FAMILY MEDICINE CLINIC | Age: 64
End: 2019-01-09
Payer: COMMERCIAL

## 2019-01-09 ENCOUNTER — HOSPITAL ENCOUNTER (OUTPATIENT)
Dept: GENERAL RADIOLOGY | Age: 64
Discharge: HOME | End: 2019-01-09
Payer: COMMERCIAL

## 2019-01-09 ENCOUNTER — HOSPITAL ENCOUNTER (OUTPATIENT)
Age: 64
Discharge: HOME | End: 2019-01-09
Payer: COMMERCIAL

## 2019-01-09 VITALS
HEART RATE: 80 BPM | DIASTOLIC BLOOD PRESSURE: 78 MMHG | SYSTOLIC BLOOD PRESSURE: 118 MMHG | RESPIRATION RATE: 16 BRPM | WEIGHT: 151 LBS | HEIGHT: 68 IN | BODY MASS INDEX: 22.88 KG/M2 | TEMPERATURE: 97.5 F

## 2019-01-09 DIAGNOSIS — M94.0 ACUTE COSTOCHONDRITIS: Primary | ICD-10-CM

## 2019-01-09 DIAGNOSIS — M94.0 ACUTE COSTOCHONDRITIS: ICD-10-CM

## 2019-01-09 PROCEDURE — G8427 DOCREV CUR MEDS BY ELIG CLIN: HCPCS | Performed by: NURSE PRACTITIONER

## 2019-01-09 PROCEDURE — 1036F TOBACCO NON-USER: CPT | Performed by: NURSE PRACTITIONER

## 2019-01-09 PROCEDURE — 71101 X-RAY EXAM UNILAT RIBS/CHEST: CPT

## 2019-01-09 PROCEDURE — G8420 CALC BMI NORM PARAMETERS: HCPCS | Performed by: NURSE PRACTITIONER

## 2019-01-09 PROCEDURE — 3017F COLORECTAL CA SCREEN DOC REV: CPT | Performed by: NURSE PRACTITIONER

## 2019-01-09 PROCEDURE — G8484 FLU IMMUNIZE NO ADMIN: HCPCS | Performed by: NURSE PRACTITIONER

## 2019-01-09 PROCEDURE — 99213 OFFICE O/P EST LOW 20 MIN: CPT | Performed by: NURSE PRACTITIONER

## 2019-01-09 PROCEDURE — 96372 THER/PROPH/DIAG INJ SC/IM: CPT | Performed by: NURSE PRACTITIONER

## 2019-01-09 RX ORDER — KETOROLAC TROMETHAMINE 30 MG/ML
60 INJECTION, SOLUTION INTRAMUSCULAR; INTRAVENOUS ONCE
Status: COMPLETED | OUTPATIENT
Start: 2019-01-09 | End: 2019-01-09

## 2019-01-09 RX ORDER — CYCLOBENZAPRINE HCL 10 MG
10 TABLET ORAL 3 TIMES DAILY PRN
Qty: 45 TABLET | Refills: 0 | Status: SHIPPED | OUTPATIENT
Start: 2019-01-09 | End: 2022-08-18

## 2019-01-09 RX ORDER — ONDANSETRON 4 MG/1
4 TABLET, FILM COATED ORAL 3 TIMES DAILY PRN
Qty: 60 TABLET | Refills: 0 | Status: SHIPPED | OUTPATIENT
Start: 2019-01-09 | End: 2020-05-29

## 2019-01-09 RX ORDER — PREDNISONE 20 MG/1
40 TABLET ORAL DAILY
Qty: 14 TABLET | Refills: 0 | Status: SHIPPED | OUTPATIENT
Start: 2019-01-09 | End: 2019-01-16

## 2019-01-09 RX ORDER — METHYLPREDNISOLONE ACETATE 40 MG/ML
40 INJECTION, SUSPENSION INTRA-ARTICULAR; INTRALESIONAL; INTRAMUSCULAR; SOFT TISSUE ONCE
Status: COMPLETED | OUTPATIENT
Start: 2019-01-09 | End: 2019-01-09

## 2019-01-09 RX ADMIN — METHYLPREDNISOLONE ACETATE 40 MG: 40 INJECTION, SUSPENSION INTRA-ARTICULAR; INTRALESIONAL; INTRAMUSCULAR; SOFT TISSUE at 15:15

## 2019-01-09 RX ADMIN — KETOROLAC TROMETHAMINE 60 MG: 30 INJECTION, SOLUTION INTRAMUSCULAR; INTRAVENOUS at 15:15

## 2019-01-09 ASSESSMENT — PATIENT HEALTH QUESTIONNAIRE - PHQ9: DEPRESSION UNABLE TO ASSESS: URGENT/EMERGENT SITUATION

## 2019-01-10 ENCOUNTER — TELEPHONE (OUTPATIENT)
Dept: FAMILY MEDICINE CLINIC | Age: 64
End: 2019-01-10

## 2019-01-15 ENCOUNTER — HOSPITAL ENCOUNTER (OUTPATIENT)
Dept: ULTRASOUND IMAGING | Age: 64
Discharge: HOME | End: 2019-01-15
Payer: COMMERCIAL

## 2019-01-15 ENCOUNTER — HOSPITAL ENCOUNTER (OUTPATIENT)
Dept: WOMENS IMAGING | Age: 64
Discharge: HOME | End: 2019-01-15
Payer: COMMERCIAL

## 2019-01-15 DIAGNOSIS — M81.8 OTHER OSTEOPOROSIS WITHOUT CURRENT PATHOLOGICAL FRACTURE: ICD-10-CM

## 2019-01-15 DIAGNOSIS — E04.2 NONTOXIC MULTINODULAR GOITER: ICD-10-CM

## 2019-01-15 PROCEDURE — 76536 US EXAM OF HEAD AND NECK: CPT

## 2019-01-15 PROCEDURE — 77080 DXA BONE DENSITY AXIAL: CPT

## 2019-01-16 ENCOUNTER — TELEPHONE (OUTPATIENT)
Dept: FAMILY MEDICINE CLINIC | Age: 64
End: 2019-01-16

## 2019-01-23 ENCOUNTER — OFFICE VISIT (OUTPATIENT)
Dept: FAMILY MEDICINE CLINIC | Age: 64
End: 2019-01-23
Payer: COMMERCIAL

## 2019-01-23 VITALS
WEIGHT: 150 LBS | RESPIRATION RATE: 16 BRPM | TEMPERATURE: 97.8 F | HEART RATE: 68 BPM | BODY MASS INDEX: 22.73 KG/M2 | HEIGHT: 68 IN | DIASTOLIC BLOOD PRESSURE: 68 MMHG | SYSTOLIC BLOOD PRESSURE: 118 MMHG

## 2019-01-23 DIAGNOSIS — J01.90 ACUTE RHINOSINUSITIS: Primary | ICD-10-CM

## 2019-01-23 PROCEDURE — 3017F COLORECTAL CA SCREEN DOC REV: CPT | Performed by: NURSE PRACTITIONER

## 2019-01-23 PROCEDURE — G8484 FLU IMMUNIZE NO ADMIN: HCPCS | Performed by: NURSE PRACTITIONER

## 2019-01-23 PROCEDURE — G8420 CALC BMI NORM PARAMETERS: HCPCS | Performed by: NURSE PRACTITIONER

## 2019-01-23 PROCEDURE — G8427 DOCREV CUR MEDS BY ELIG CLIN: HCPCS | Performed by: NURSE PRACTITIONER

## 2019-01-23 PROCEDURE — 1036F TOBACCO NON-USER: CPT | Performed by: NURSE PRACTITIONER

## 2019-01-23 PROCEDURE — 99213 OFFICE O/P EST LOW 20 MIN: CPT | Performed by: NURSE PRACTITIONER

## 2019-01-23 RX ORDER — DOXYCYCLINE HYCLATE 100 MG
100 TABLET ORAL 2 TIMES DAILY
Qty: 14 TABLET | Refills: 0 | Status: SHIPPED | OUTPATIENT
Start: 2019-01-23 | End: 2019-01-30

## 2019-01-23 ASSESSMENT — PATIENT HEALTH QUESTIONNAIRE - PHQ9
2. FEELING DOWN, DEPRESSED OR HOPELESS: 0
SUM OF ALL RESPONSES TO PHQ QUESTIONS 1-9: 0
1. LITTLE INTEREST OR PLEASURE IN DOING THINGS: 0
SUM OF ALL RESPONSES TO PHQ9 QUESTIONS 1 & 2: 0
SUM OF ALL RESPONSES TO PHQ QUESTIONS 1-9: 0

## 2019-02-05 ENCOUNTER — NURSE TRIAGE (OUTPATIENT)
Dept: ADMINISTRATIVE | Age: 64
End: 2019-02-05

## 2019-02-05 ENCOUNTER — HOSPITAL ENCOUNTER (OUTPATIENT)
Age: 64
Discharge: HOME | End: 2019-02-05
Payer: COMMERCIAL

## 2019-02-05 LAB
T4 FREE: 0.51 NG/DL (ref 0.93–1.76)
TSH SERPL DL<=0.05 MIU/L-ACNC: 59.51 UIU/ML (ref 0.4–4.2)

## 2019-02-05 PROCEDURE — 84443 ASSAY THYROID STIM HORMONE: CPT

## 2019-02-05 PROCEDURE — 84439 ASSAY OF FREE THYROXINE: CPT

## 2019-02-05 PROCEDURE — 36415 COLL VENOUS BLD VENIPUNCTURE: CPT

## 2019-03-02 ENCOUNTER — HOSPITAL ENCOUNTER (OUTPATIENT)
Age: 64
Discharge: HOME | End: 2019-03-02
Payer: COMMERCIAL

## 2019-03-02 DIAGNOSIS — Z87.898 HISTORY OF PALPITATIONS: ICD-10-CM

## 2019-03-02 DIAGNOSIS — R06.02 SOB (SHORTNESS OF BREATH) ON EXERTION: ICD-10-CM

## 2019-03-02 DIAGNOSIS — E78.00 PURE HYPERCHOLESTEROLEMIA: ICD-10-CM

## 2019-03-02 DIAGNOSIS — R94.31 ABNORMAL EKG: ICD-10-CM

## 2019-03-02 LAB
ALBUMIN SERPL-MCNC: 4.6 G/DL (ref 3.5–5.1)
ALP BLD-CCNC: 418 U/L (ref 38–126)
ALT SERPL-CCNC: 166 U/L (ref 11–66)
AST SERPL-CCNC: 114 U/L (ref 5–40)
BILIRUB SERPL-MCNC: 0.5 MG/DL (ref 0.3–1.2)
BILIRUBIN DIRECT: < 0.2 MG/DL (ref 0–0.3)
CHOLESTEROL, TOTAL: 172 MG/DL (ref 100–199)
HDLC SERPL-MCNC: 86 MG/DL
LDL CHOLESTEROL CALCULATED: 71 MG/DL
TOTAL PROTEIN: 7.6 G/DL (ref 6.1–8)
TRIGL SERPL-MCNC: 73 MG/DL (ref 0–199)

## 2019-03-02 PROCEDURE — 36415 COLL VENOUS BLD VENIPUNCTURE: CPT

## 2019-03-02 PROCEDURE — 80061 LIPID PANEL: CPT

## 2019-03-02 PROCEDURE — 80076 HEPATIC FUNCTION PANEL: CPT

## 2019-03-04 ENCOUNTER — OFFICE VISIT (OUTPATIENT)
Dept: CARDIOLOGY CLINIC | Age: 64
End: 2019-03-04
Payer: COMMERCIAL

## 2019-03-04 VITALS
BODY MASS INDEX: 22.31 KG/M2 | DIASTOLIC BLOOD PRESSURE: 72 MMHG | SYSTOLIC BLOOD PRESSURE: 115 MMHG | HEIGHT: 68 IN | HEART RATE: 84 BPM | WEIGHT: 147.2 LBS

## 2019-03-04 DIAGNOSIS — R06.02 SOB (SHORTNESS OF BREATH) ON EXERTION: ICD-10-CM

## 2019-03-04 DIAGNOSIS — Z87.898 HISTORY OF PALPITATIONS: ICD-10-CM

## 2019-03-04 DIAGNOSIS — R94.31 ABNORMAL EKG: ICD-10-CM

## 2019-03-04 DIAGNOSIS — E78.00 PURE HYPERCHOLESTEROLEMIA: ICD-10-CM

## 2019-03-04 DIAGNOSIS — R79.89 ABNORMAL LFTS: Primary | ICD-10-CM

## 2019-03-04 PROCEDURE — 3017F COLORECTAL CA SCREEN DOC REV: CPT | Performed by: INTERNAL MEDICINE

## 2019-03-04 PROCEDURE — G8484 FLU IMMUNIZE NO ADMIN: HCPCS | Performed by: INTERNAL MEDICINE

## 2019-03-04 PROCEDURE — G8427 DOCREV CUR MEDS BY ELIG CLIN: HCPCS | Performed by: INTERNAL MEDICINE

## 2019-03-04 PROCEDURE — 1036F TOBACCO NON-USER: CPT | Performed by: INTERNAL MEDICINE

## 2019-03-04 PROCEDURE — 99214 OFFICE O/P EST MOD 30 MIN: CPT | Performed by: INTERNAL MEDICINE

## 2019-03-04 PROCEDURE — G8420 CALC BMI NORM PARAMETERS: HCPCS | Performed by: INTERNAL MEDICINE

## 2019-03-04 RX ORDER — ALENDRONATE SODIUM 70 MG/1
TABLET ORAL
Refills: 1 | COMMUNITY
Start: 2019-02-07 | End: 2020-02-04 | Stop reason: ALTCHOICE

## 2019-03-04 RX ORDER — LEVOTHYROXINE SODIUM 112 UG/1
100 TABLET ORAL DAILY
COMMUNITY
End: 2019-12-13 | Stop reason: ALTCHOICE

## 2019-03-08 ENCOUNTER — HOSPITAL ENCOUNTER (OUTPATIENT)
Age: 64
Discharge: HOME | End: 2019-03-08
Payer: COMMERCIAL

## 2019-03-08 LAB
AMMONIA: 39 UMOL/L (ref 11–60)
BUN BLDV-MCNC: 11 MG/DL (ref 7–22)
CREAT SERPL-MCNC: 0.7 MG/DL (ref 0.4–1.2)
FERRITIN: 148 NG/ML (ref 10–291)
GFR SERPL CREATININE-BSD FRML MDRD: 84 ML/MIN/1.73M2
HAV IGM SER IA-ACNC: NEGATIVE
HBV SURFACE AB TITR SER: NEGATIVE {TITER}
HEPATITIS B CORE IGM ANTIBODY: NEGATIVE
HEPATITIS B SURFACE ANTIGEN: NEGATIVE
INR BLD: 0.98 (ref 0.85–1.13)
IRON: 103 UG/DL (ref 50–170)
TOTAL IRON BINDING CAPACITY: 285 UG/DL (ref 171–450)

## 2019-03-08 PROCEDURE — 84520 ASSAY OF UREA NITROGEN: CPT

## 2019-03-08 PROCEDURE — 82728 ASSAY OF FERRITIN: CPT

## 2019-03-08 PROCEDURE — 82103 ALPHA-1-ANTITRYPSIN TOTAL: CPT

## 2019-03-08 PROCEDURE — 86708 HEPATITIS A ANTIBODY: CPT

## 2019-03-08 PROCEDURE — 36415 COLL VENOUS BLD VENIPUNCTURE: CPT

## 2019-03-08 PROCEDURE — 86709 HEPATITIS A IGM ANTIBODY: CPT

## 2019-03-08 PROCEDURE — 87340 HEPATITIS B SURFACE AG IA: CPT

## 2019-03-08 PROCEDURE — 82565 ASSAY OF CREATININE: CPT

## 2019-03-08 PROCEDURE — 83550 IRON BINDING TEST: CPT

## 2019-03-08 PROCEDURE — 83540 ASSAY OF IRON: CPT

## 2019-03-08 PROCEDURE — 86705 HEP B CORE ANTIBODY IGM: CPT

## 2019-03-08 PROCEDURE — 86038 ANTINUCLEAR ANTIBODIES: CPT

## 2019-03-08 PROCEDURE — 83516 IMMUNOASSAY NONANTIBODY: CPT

## 2019-03-08 PROCEDURE — 85610 PROTHROMBIN TIME: CPT

## 2019-03-08 PROCEDURE — 82140 ASSAY OF AMMONIA: CPT

## 2019-03-08 PROCEDURE — 82390 ASSAY OF CERULOPLASMIN: CPT

## 2019-03-08 PROCEDURE — 86706 HEP B SURFACE ANTIBODY: CPT

## 2019-03-10 LAB
ALPHA-1 ANTITRYPSIN: 149 MG/DL (ref 90–200)
ANA SCREEN: NORMAL
CERULOPLASMIN: 31 MG/DL (ref 17–54)
HAV AB SERPL IA-ACNC: NEGATIVE

## 2019-03-11 ENCOUNTER — TELEPHONE (OUTPATIENT)
Dept: FAMILY MEDICINE CLINIC | Age: 64
End: 2019-03-11

## 2019-03-11 LAB — MITOCHONDRIAL ANTIBODY: 17.4 UNITS (ref 0–20)

## 2019-03-18 ENCOUNTER — HOSPITAL ENCOUNTER (OUTPATIENT)
Dept: ULTRASOUND IMAGING | Age: 64
Discharge: HOME | End: 2019-03-18
Payer: COMMERCIAL

## 2019-03-18 DIAGNOSIS — R74.8 ELEVATED LIVER ENZYMES: ICD-10-CM

## 2019-03-18 PROCEDURE — 93975 VASCULAR STUDY: CPT

## 2019-03-18 PROCEDURE — 76705 ECHO EXAM OF ABDOMEN: CPT

## 2019-03-26 ENCOUNTER — HOSPITAL ENCOUNTER (OUTPATIENT)
Dept: CT IMAGING | Age: 64
Discharge: HOME | End: 2019-03-26
Payer: COMMERCIAL

## 2019-03-26 ENCOUNTER — NURSE TRIAGE (OUTPATIENT)
Dept: ADMINISTRATIVE | Age: 64
End: 2019-03-26

## 2019-03-26 DIAGNOSIS — R10.32 LEFT LOWER QUADRANT PAIN: ICD-10-CM

## 2019-03-26 PROCEDURE — 74176 CT ABD & PELVIS W/O CONTRAST: CPT

## 2019-03-27 ENCOUNTER — HOSPITAL ENCOUNTER (OUTPATIENT)
Age: 64
Setting detail: OUTPATIENT SURGERY
Discharge: HOME | End: 2019-03-27
Attending: INTERNAL MEDICINE | Admitting: INTERNAL MEDICINE
Payer: COMMERCIAL

## 2019-03-27 ENCOUNTER — ANESTHESIA EVENT (OUTPATIENT)
Dept: ENDOSCOPY | Age: 64
End: 2019-03-27
Payer: COMMERCIAL

## 2019-03-27 ENCOUNTER — ANESTHESIA (OUTPATIENT)
Dept: ENDOSCOPY | Age: 64
End: 2019-03-27
Payer: COMMERCIAL

## 2019-03-27 VITALS
OXYGEN SATURATION: 100 % | BODY MASS INDEX: 21.53 KG/M2 | WEIGHT: 145.4 LBS | TEMPERATURE: 97.5 F | DIASTOLIC BLOOD PRESSURE: 58 MMHG | HEART RATE: 67 BPM | HEIGHT: 69 IN | RESPIRATION RATE: 16 BRPM | SYSTOLIC BLOOD PRESSURE: 123 MMHG

## 2019-03-27 VITALS
SYSTOLIC BLOOD PRESSURE: 95 MMHG | DIASTOLIC BLOOD PRESSURE: 54 MMHG | RESPIRATION RATE: 14 BRPM | OXYGEN SATURATION: 98 %

## 2019-03-27 PROCEDURE — 3700000001 HC ADD 15 MINUTES (ANESTHESIA): Performed by: INTERNAL MEDICINE

## 2019-03-27 PROCEDURE — C1773 RET DEV, INSERTABLE: HCPCS | Performed by: INTERNAL MEDICINE

## 2019-03-27 PROCEDURE — 88305 TISSUE EXAM BY PATHOLOGIST: CPT

## 2019-03-27 PROCEDURE — 3700000000 HC ANESTHESIA ATTENDED CARE: Performed by: INTERNAL MEDICINE

## 2019-03-27 PROCEDURE — 2500000003 HC RX 250 WO HCPCS: Performed by: SPECIALIST

## 2019-03-27 PROCEDURE — 2580000003 HC RX 258: Performed by: INTERNAL MEDICINE

## 2019-03-27 PROCEDURE — 6360000002 HC RX W HCPCS: Performed by: SPECIALIST

## 2019-03-27 PROCEDURE — 7100000000 HC PACU RECOVERY - FIRST 15 MIN: Performed by: INTERNAL MEDICINE

## 2019-03-27 PROCEDURE — 2709999900 HC NON-CHARGEABLE SUPPLY: Performed by: INTERNAL MEDICINE

## 2019-03-27 PROCEDURE — 3609010600 HC COLONOSCOPY POLYPECTOMY SNARE/COLD BIOPSY: Performed by: INTERNAL MEDICINE

## 2019-03-27 PROCEDURE — 3609019800 HC COLONOSCOPY WITH SUBMUCOSAL INJECTION: Performed by: INTERNAL MEDICINE

## 2019-03-27 PROCEDURE — 7100000001 HC PACU RECOVERY - ADDTL 15 MIN: Performed by: INTERNAL MEDICINE

## 2019-03-27 RX ORDER — SODIUM CHLORIDE 450 MG/100ML
INJECTION, SOLUTION INTRAVENOUS CONTINUOUS
Status: DISCONTINUED | OUTPATIENT
Start: 2019-03-27 | End: 2019-03-27 | Stop reason: HOSPADM

## 2019-03-27 RX ORDER — GLYCOPYRROLATE 1 MG/5 ML
SYRINGE (ML) INTRAVENOUS PRN
Status: DISCONTINUED | OUTPATIENT
Start: 2019-03-27 | End: 2019-03-27 | Stop reason: SDUPTHER

## 2019-03-27 RX ORDER — PROPOFOL 10 MG/ML
INJECTION, EMULSION INTRAVENOUS PRN
Status: DISCONTINUED | OUTPATIENT
Start: 2019-03-27 | End: 2019-03-27 | Stop reason: SDUPTHER

## 2019-03-27 RX ORDER — LIDOCAINE HYDROCHLORIDE 20 MG/ML
INJECTION, SOLUTION INFILTRATION; PERINEURAL PRN
Status: DISCONTINUED | OUTPATIENT
Start: 2019-03-27 | End: 2019-03-27 | Stop reason: SDUPTHER

## 2019-03-27 RX ADMIN — PROPOFOL 30 MG: 10 INJECTION, EMULSION INTRAVENOUS at 09:09

## 2019-03-27 RX ADMIN — LIDOCAINE HYDROCHLORIDE 100 MG: 20 INJECTION, SOLUTION INFILTRATION; PERINEURAL at 08:54

## 2019-03-27 RX ADMIN — Medication 0.1 MG: at 08:54

## 2019-03-27 RX ADMIN — PROPOFOL 30 MG: 10 INJECTION, EMULSION INTRAVENOUS at 09:13

## 2019-03-27 RX ADMIN — PROPOFOL 100 MG: 10 INJECTION, EMULSION INTRAVENOUS at 08:54

## 2019-03-27 RX ADMIN — PROPOFOL 30 MG: 10 INJECTION, EMULSION INTRAVENOUS at 09:05

## 2019-03-27 RX ADMIN — SODIUM CHLORIDE: 4.5 INJECTION, SOLUTION INTRAVENOUS at 07:57

## 2019-03-27 RX ADMIN — PROPOFOL 20 MG: 10 INJECTION, EMULSION INTRAVENOUS at 09:21

## 2019-03-27 RX ADMIN — PROPOFOL 30 MG: 10 INJECTION, EMULSION INTRAVENOUS at 08:59

## 2019-03-27 RX ADMIN — PROPOFOL 30 MG: 10 INJECTION, EMULSION INTRAVENOUS at 09:02

## 2019-03-27 RX ADMIN — PROPOFOL 30 MG: 10 INJECTION, EMULSION INTRAVENOUS at 09:17

## 2019-03-27 ASSESSMENT — PAIN - FUNCTIONAL ASSESSMENT: PAIN_FUNCTIONAL_ASSESSMENT: 0-10

## 2019-03-27 ASSESSMENT — PAIN SCALES - GENERAL
PAINLEVEL_OUTOF10: 0

## 2019-03-27 ASSESSMENT — ENCOUNTER SYMPTOMS
SHORTNESS OF BREATH: 1
DYSPNEA ACTIVITY LEVEL: AFTER AMBULATING 1 FLIGHT OF STAIRS

## 2019-03-27 ASSESSMENT — COPD QUESTIONNAIRES: CAT_SEVERITY: MODERATE

## 2019-04-02 ENCOUNTER — HOSPITAL ENCOUNTER (OUTPATIENT)
Age: 64
Discharge: HOME | End: 2019-04-02
Payer: COMMERCIAL

## 2019-04-02 LAB
ALBUMIN SERPL-MCNC: 4 G/DL (ref 3.5–5.1)
ALP BLD-CCNC: 92 U/L (ref 38–126)
ALT SERPL-CCNC: 11 U/L (ref 11–66)
AST SERPL-CCNC: 17 U/L (ref 5–40)
BILIRUB SERPL-MCNC: 0.4 MG/DL (ref 0.3–1.2)
BILIRUBIN DIRECT: < 0.2 MG/DL (ref 0–0.3)
TOTAL PROTEIN: 7 G/DL (ref 6.1–8)

## 2019-04-02 PROCEDURE — 80076 HEPATIC FUNCTION PANEL: CPT

## 2019-04-02 PROCEDURE — 36415 COLL VENOUS BLD VENIPUNCTURE: CPT

## 2019-04-04 ENCOUNTER — HOSPITAL ENCOUNTER (OUTPATIENT)
Dept: NUCLEAR MEDICINE | Age: 64
Discharge: HOME | End: 2019-04-04
Payer: COMMERCIAL

## 2019-04-04 DIAGNOSIS — M13.0 POLYARTHRITIS: ICD-10-CM

## 2019-04-04 PROCEDURE — 78306 BONE IMAGING WHOLE BODY: CPT

## 2019-04-04 PROCEDURE — A9503 TC99M MEDRONATE: HCPCS | Performed by: PHYSICAL MEDICINE & REHABILITATION

## 2019-04-04 PROCEDURE — 3430000000 HC RX DIAGNOSTIC RADIOPHARMACEUTICAL: Performed by: PHYSICAL MEDICINE & REHABILITATION

## 2019-04-04 RX ORDER — TC 99M MEDRONATE 20 MG/10ML
27.8 INJECTION, POWDER, LYOPHILIZED, FOR SOLUTION INTRAVENOUS
Status: COMPLETED | OUTPATIENT
Start: 2019-04-04 | End: 2019-04-04

## 2019-04-04 RX ADMIN — TC 99M MEDRONATE 27.8 MILLICURIE: 20 INJECTION, POWDER, LYOPHILIZED, FOR SOLUTION INTRAVENOUS at 09:25

## 2019-05-03 ENCOUNTER — HOSPITAL ENCOUNTER (OUTPATIENT)
Age: 64
Discharge: HOME | End: 2019-05-03
Payer: COMMERCIAL

## 2019-05-03 LAB
T4 FREE: 1.67 NG/DL (ref 0.93–1.76)
TSH SERPL DL<=0.05 MIU/L-ACNC: 0.01 UIU/ML (ref 0.4–4.2)

## 2019-05-03 PROCEDURE — 84439 ASSAY OF FREE THYROXINE: CPT

## 2019-05-03 PROCEDURE — 36415 COLL VENOUS BLD VENIPUNCTURE: CPT

## 2019-05-03 PROCEDURE — 84443 ASSAY THYROID STIM HORMONE: CPT

## 2019-05-21 ENCOUNTER — HOSPITAL ENCOUNTER (OUTPATIENT)
Age: 64
Discharge: HOME | End: 2019-05-21
Payer: COMMERCIAL

## 2019-05-21 LAB
ALBUMIN SERPL-MCNC: 4.6 G/DL (ref 3.5–5.1)
ALP BLD-CCNC: 80 U/L (ref 38–126)
ALT SERPL-CCNC: 12 U/L (ref 11–66)
AST SERPL-CCNC: 16 U/L (ref 5–40)
BILIRUB SERPL-MCNC: 0.4 MG/DL (ref 0.3–1.2)
BILIRUBIN DIRECT: < 0.2 MG/DL (ref 0–0.3)
IGA: 272 MG/DL (ref 70–400)
TOTAL PROTEIN: 7.9 G/DL (ref 6.1–8)

## 2019-05-21 PROCEDURE — 82784 ASSAY IGA/IGD/IGG/IGM EACH: CPT

## 2019-05-21 PROCEDURE — 80076 HEPATIC FUNCTION PANEL: CPT

## 2019-05-21 PROCEDURE — 36415 COLL VENOUS BLD VENIPUNCTURE: CPT

## 2019-05-21 PROCEDURE — 83516 IMMUNOASSAY NONANTIBODY: CPT

## 2019-05-23 LAB
TISSUE TRANSGLUTAMINASE ANTIBODY: 1 U/ML (ref 0–5)
TISSUE TRANSGLUTAMINASE IGA: 0 U/ML (ref 0–3)

## 2019-05-30 ENCOUNTER — OFFICE VISIT (OUTPATIENT)
Dept: CARDIOLOGY CLINIC | Age: 64
End: 2019-05-30
Payer: COMMERCIAL

## 2019-05-30 VITALS
WEIGHT: 146.4 LBS | SYSTOLIC BLOOD PRESSURE: 120 MMHG | HEIGHT: 69 IN | DIASTOLIC BLOOD PRESSURE: 76 MMHG | HEART RATE: 86 BPM | BODY MASS INDEX: 21.68 KG/M2

## 2019-05-30 DIAGNOSIS — E78.01 FAMILIAL HYPERCHOLESTEROLEMIA: ICD-10-CM

## 2019-05-30 DIAGNOSIS — R06.09 DYSPNEA ON EXERTION: Primary | ICD-10-CM

## 2019-05-30 PROCEDURE — 3017F COLORECTAL CA SCREEN DOC REV: CPT | Performed by: NUCLEAR MEDICINE

## 2019-05-30 PROCEDURE — G8420 CALC BMI NORM PARAMETERS: HCPCS | Performed by: NUCLEAR MEDICINE

## 2019-05-30 PROCEDURE — 1036F TOBACCO NON-USER: CPT | Performed by: NUCLEAR MEDICINE

## 2019-05-30 PROCEDURE — 99214 OFFICE O/P EST MOD 30 MIN: CPT | Performed by: NUCLEAR MEDICINE

## 2019-05-30 PROCEDURE — G8427 DOCREV CUR MEDS BY ELIG CLIN: HCPCS | Performed by: NUCLEAR MEDICINE

## 2019-05-30 NOTE — PROGRESS NOTES
Smokeless tobacco: Never Used   Substance Use Topics    Alcohol use: Yes     Alcohol/week: 0.0 oz     Comment: rare at holidays      Current Outpatient Medications   Medication Sig Dispense Refill    levothyroxine (SYNTHROID) 112 MCG tablet Take 112 mcg by mouth Daily      alendronate (FOSAMAX) 70 MG tablet TK 1 T PO ONCE A WEEK Q SUNDAY BEFORE BREAKFAST. DO NOT LIE DOWN AFTER TAKING THE MEDICATION  1    cyclobenzaprine (FLEXERIL) 10 MG tablet Take 1 tablet by mouth 3 times daily as needed for Muscle spasms (chest wall pain) 45 tablet 0    ondansetron (ZOFRAN) 4 MG tablet Take 1 tablet by mouth 3 times daily as needed for Nausea or Vomiting 60 tablet 0    lidocaine (LIDODERM) 5 % Place 1 patch onto the skin daily as needed for Pain 12 hours on, 12 hours off. 30 patch 3    diclofenac sodium (VOLTAREN) 1 % GEL Apply 2 g topically 4 times daily 5 Tube 2    XOPENEX HFA 45 MCG/ACT inhaler INHALE 2 PUFFS INTO THE LUNGS EVERY 6 HOURS AS NEEDED FOR WHEEZING 15 g 2    XOPENEX HFA 45 MCG/ACT inhaler Inhale 2 puffs into the lungs every 6 hours as needed for Wheezing or Shortness of Breath 1 Inhaler 11    fluticasone (FLONASE) 50 MCG/ACT nasal spray 2 sprays by Nasal route daily 1 Bottle 11    NONFORMULARY Take 2 capsules by mouth daily OMEGA Q plus Resiverol from Dr. Darryl Pretty acetaminophen (TYLENOL) 500 MG tablet Take 500 mg by mouth as needed for Pain      Menthol, Topical Analgesic, (PERFORM PAIN RELIEVING ROLL-ON EX) Apply topically as needed from the makers of Biofreeze (Menthol 3.5%)      vitamin D (CHOLECALCIFEROL) 1000 UNITS TABS tablet Take 1 tablet by mouth daily (Patient taking differently: Take 4,000 Units by mouth daily Vitamin D3) 30 tablet 5     No current facility-administered medications for this visit.       Allergies   Allergen Reactions    Ciprofloxacin     Erythromycin Itching     Ointment for eyes - become irritated    Hydrocodone Itching    Lipitor [Atorvastatin] Other (See Comments) ABNORMAL LIVER TESTS    Morphine     Percocet [Oxycodone-Acetaminophen] Itching    Robitussin Dm [Dextromethorphan-Guaifenesin]      Swelling of eyes    Sulfa Antibiotics Hives    Augmentin [Amoxicillin-Pot Clavulanate] Diarrhea and Nausea And Vomiting    Codeine Nausea And Vomiting    Pcn [Penicillins] Diarrhea and Nausea And Vomiting     Health Maintenance   Topic Date Due    Pneumococcal 0-64 years Vaccine (1 of 1 - PPSV23) 08/05/1961    HIV screen  08/05/1970    Shingles Vaccine (1 of 2) 08/05/2005    DTaP/Tdap/Td vaccine (1 - Tdap) 08/31/2016    Breast cancer screen  02/09/2018    Flu vaccine (Season Ended) 09/01/2019    A1C test (Diabetic or Prediabetic)  09/24/2019    TSH testing  05/03/2020    Cervical cancer screen  08/04/2020    Lipid screen  03/02/2024    Colon cancer screen colonoscopy  03/27/2029    Hepatitis C screen  Completed       Subjective:  Review of Systems  General:   No fever, no chills, No fatigue or weight loss  Pulmonary:    No dyspnea, no wheezing  Cardiac:    Denies recent chest pain,   GI:     No nausea or vomiting, no abdominal pain  Neuro:    No dizziness or light headedness,   Musculoskeletal:  No recent active issues  Extremities:   No edema, good peripheral pulses      Objective:  Physical Exam  /76   Pulse 86   Ht 5' 9\" (1.753 m)   Wt 146 lb 6.4 oz (66.4 kg)   BMI 21.62 kg/m²  General:   Well developed, well nourished  Lungs:   Clear to auscultation  Heart:    Normal S1 S2, Slight murmur. no rubs, no gallops  Abdomen:   Soft, non tender, no organomegalies, positive bowel sounds  Extremities:   No edema, no cyanosis, good peripheral pulses  Neurological:   Awake, alert, oriented. No obvious focal deficits  Musculoskelatal:  No obvious deformities      Assessment:      Diagnosis Orders   1. Dyspnea on exertion     2.  Familial hypercholesterolemia     risk for CAD  Abnormal baseline ECG   Yet normal stress test and echo   Uncontrolled

## 2019-05-30 NOTE — PROGRESS NOTES
3 month f/u. Denies cp, palpitations and SOLOMON. C/o sob with and without activity, dizziness, lightheaded, pins and needle feeling in feet.     Patient is only taking synthroid due to abnormal liver test.

## 2019-06-03 ENCOUNTER — HOSPITAL ENCOUNTER (OUTPATIENT)
Age: 64
Discharge: HOME | End: 2019-06-03
Payer: COMMERCIAL

## 2019-06-03 DIAGNOSIS — E78.01 FAMILIAL HYPERCHOLESTEROLEMIA: ICD-10-CM

## 2019-06-03 DIAGNOSIS — R06.09 DYSPNEA ON EXERTION: ICD-10-CM

## 2019-06-03 LAB
CHOLESTEROL, TOTAL: 254 MG/DL (ref 100–199)
HDLC SERPL-MCNC: 57 MG/DL
LDL CHOLESTEROL CALCULATED: 170 MG/DL
TRIGL SERPL-MCNC: 134 MG/DL (ref 0–199)

## 2019-06-03 PROCEDURE — 36415 COLL VENOUS BLD VENIPUNCTURE: CPT

## 2019-06-03 PROCEDURE — 80061 LIPID PANEL: CPT

## 2019-06-03 PROCEDURE — 83695 ASSAY OF LIPOPROTEIN(A): CPT

## 2019-06-05 ENCOUNTER — TELEPHONE (OUTPATIENT)
Dept: CARDIOLOGY CLINIC | Age: 64
End: 2019-06-05

## 2019-06-05 NOTE — TELEPHONE ENCOUNTER
Monitor. Patient LM on nurse line asking for the results of her lab work. Cholesterol is much higher compared to lipid panel done on 3-2-19. Recommendations? 6/3/2019  5:47 PM - Jose, [facility] Incoming Lab Results From Soft     Component Value Ref Range & Units Status Collected Lab   Cholesterol, Total 254High   100 - 199 mg/dL Final 06/03/2019  4:31 PM [facility]        <200          Desirable        200 - 239     Borderline High        >239          High    Triglycerides 134  0 - 199 mg/dL Final 06/03/2019  4:31 PM [facility]        <150          Desirable        150 - 199     Borderline High        200 - 499     High        >449          Very High        Ranges are based upon NCEP/ATP III guidelines. HDL 57  mg/dL Final 06/03/2019  4:31 PM  - [address] [place] [address]   Refer to General Chemistry for CHOL and TRIG results.        HDL CLASSIFICATIONS FOR PATIENTS > 21YEARS OLD.      <40               Undesirable (Major Risk Factor)        >60               Protective (Negative Risk Factor)    LDL Calculated 170  mg/dL Final 06/03/2019  4:31 PM  - [facility]        Refer to General Chemistry for CHOL and TRIG results.        LDL CLASSIFICATIONS FOR PATIENTS >21YEARS OLD:           ** Determination Invalid if TRIG >400 **        <100          Optimal        100 - 129     Near or Above Optimal        130 - 159     Borderline High        160 - 189     High Risk        >189          Very High Risk   Performed at [address] [place] [address], [address]    Testing Performed By     Maria Guadalupe Nicole Name Director Address Valid Date Range   [facility] [address]inald Camarena  [address]  [facility] [id number] 08/31/17 0844-Present   Lab and Collection     Lipid Panel - 6/3/2019   Result History     Lipid Panel on 6/3/2019   Result Information     Flag: Abnormal Status: Final result (Collected: 6/3/2019 16:31) Provider

## 2019-06-06 LAB — LIPOPROTEIN (A): NORMAL

## 2019-06-06 NOTE — TELEPHONE ENCOUNTER
Spoke with patient at length re: Wade. She also wants to know from Dr Ailyn Ruelas if she should see a Lipidologist.     Per Dr Ailyn Ruelas:  He is recommending Ilia More. If she wants to see a Lipidologist she can but he is think she needs it at this point. Spoke to patient again and gave Dr Nestor Espinosa recommendations. Voiced understanding. Pt will call back a let us know if she wants to start Ilia Falcont.

## 2019-08-24 ENCOUNTER — HOSPITAL ENCOUNTER (OUTPATIENT)
Age: 64
Discharge: HOME | End: 2019-08-24
Payer: COMMERCIAL

## 2019-08-24 LAB
CHOLESTEROL, TOTAL: 201 MG/DL (ref 100–199)
HDLC SERPL-MCNC: 54 MG/DL
LDL CHOLESTEROL CALCULATED: 124 MG/DL
T4 FREE: 1.63 NG/DL (ref 0.93–1.76)
TRIGL SERPL-MCNC: 117 MG/DL (ref 0–199)
TSH SERPL DL<=0.05 MIU/L-ACNC: 0.01 UIU/ML (ref 0.4–4.2)

## 2019-08-24 PROCEDURE — 84443 ASSAY THYROID STIM HORMONE: CPT

## 2019-08-24 PROCEDURE — 36415 COLL VENOUS BLD VENIPUNCTURE: CPT

## 2019-08-24 PROCEDURE — 80061 LIPID PANEL: CPT

## 2019-08-24 PROCEDURE — 84439 ASSAY OF FREE THYROXINE: CPT

## 2019-09-24 RX ORDER — LEVALBUTEROL TARTRATE 45 UG/1
2 AEROSOL, METERED ORAL EVERY 6 HOURS PRN
Qty: 15 G | Refills: 0 | Status: SHIPPED | OUTPATIENT
Start: 2019-09-24 | End: 2019-10-18 | Stop reason: SDUPTHER

## 2019-10-18 RX ORDER — LEVALBUTEROL TARTRATE 45 MCG
HFA AEROSOL WITH ADAPTER (GRAM) INHALATION
Qty: 15 G | Refills: 0 | Status: SHIPPED | OUTPATIENT
Start: 2019-10-18 | End: 2019-12-13 | Stop reason: SDUPTHER

## 2019-11-12 ENCOUNTER — HOSPITAL ENCOUNTER (EMERGENCY)
Age: 64
Discharge: HOME | End: 2019-11-12
Payer: COMMERCIAL

## 2019-11-12 VITALS
DIASTOLIC BLOOD PRESSURE: 63 MMHG | TEMPERATURE: 98.3 F | HEART RATE: 67 BPM | BODY MASS INDEX: 20.67 KG/M2 | SYSTOLIC BLOOD PRESSURE: 131 MMHG | OXYGEN SATURATION: 95 % | RESPIRATION RATE: 16 BRPM | WEIGHT: 140 LBS

## 2019-11-12 DIAGNOSIS — J06.9 UPPER RESPIRATORY TRACT INFECTION, UNSPECIFIED TYPE: Primary | ICD-10-CM

## 2019-11-12 PROCEDURE — 99214 OFFICE O/P EST MOD 30 MIN: CPT | Performed by: NURSE PRACTITIONER

## 2019-11-12 PROCEDURE — 99212 OFFICE O/P EST SF 10 MIN: CPT

## 2019-11-12 RX ORDER — AZITHROMYCIN 250 MG/1
TABLET, FILM COATED ORAL
Qty: 6 TABLET | Refills: 0 | Status: SHIPPED | OUTPATIENT
Start: 2019-11-12 | End: 2019-12-06

## 2019-11-12 RX ORDER — BENZONATATE 200 MG/1
200 CAPSULE ORAL 3 TIMES DAILY PRN
Qty: 21 CAPSULE | Refills: 0 | Status: SHIPPED | OUTPATIENT
Start: 2019-11-12 | End: 2019-11-19

## 2019-11-12 RX ORDER — FLUTICASONE PROPIONATE 50 MCG
1 SPRAY, SUSPENSION (ML) NASAL DAILY
Qty: 1 BOTTLE | Refills: 0 | Status: SHIPPED | OUTPATIENT
Start: 2019-11-12 | End: 2019-12-13 | Stop reason: ALTCHOICE

## 2019-11-12 RX ORDER — CETIRIZINE HYDROCHLORIDE 10 MG/1
10 TABLET ORAL DAILY
Qty: 30 TABLET | Refills: 0 | Status: SHIPPED | OUTPATIENT
Start: 2019-11-12 | End: 2019-12-13 | Stop reason: ALTCHOICE

## 2019-11-12 ASSESSMENT — ENCOUNTER SYMPTOMS
RHINORRHEA: 1
SORE THROAT: 1
NAUSEA: 0
DIARRHEA: 0
EYE DISCHARGE: 0
SHORTNESS OF BREATH: 0
COUGH: 1
EYE ITCHING: 0
SINUS PRESSURE: 1
VOMITING: 0

## 2019-11-12 ASSESSMENT — PAIN SCALES - GENERAL: PAINLEVEL_OUTOF10: 7

## 2019-11-12 ASSESSMENT — PAIN DESCRIPTION - LOCATION: LOCATION: THROAT

## 2019-11-12 ASSESSMENT — PAIN DESCRIPTION - DESCRIPTORS: DESCRIPTORS: SORE

## 2019-11-12 ASSESSMENT — PAIN DESCRIPTION - PAIN TYPE: TYPE: ACUTE PAIN

## 2019-11-12 ASSESSMENT — PAIN DESCRIPTION - FREQUENCY: FREQUENCY: CONTINUOUS

## 2019-11-13 RX ORDER — LEVALBUTEROL TARTRATE 45 MCG
HFA AEROSOL WITH ADAPTER (GRAM) INHALATION
Qty: 15 G | Refills: 0 | OUTPATIENT
Start: 2019-11-13

## 2019-12-04 ENCOUNTER — HOSPITAL ENCOUNTER (OUTPATIENT)
Age: 64
Discharge: HOME | End: 2019-12-04
Payer: COMMERCIAL

## 2019-12-04 LAB
T4 FREE: 1.32 NG/DL (ref 0.93–1.76)
TSH SERPL DL<=0.05 MIU/L-ACNC: 0.13 UIU/ML (ref 0.4–4.2)

## 2019-12-04 PROCEDURE — 84443 ASSAY THYROID STIM HORMONE: CPT

## 2019-12-04 PROCEDURE — 36415 COLL VENOUS BLD VENIPUNCTURE: CPT

## 2019-12-04 PROCEDURE — 84439 ASSAY OF FREE THYROXINE: CPT

## 2019-12-06 ENCOUNTER — HOSPITAL ENCOUNTER (EMERGENCY)
Age: 64
Discharge: HOME | End: 2019-12-06
Payer: COMMERCIAL

## 2019-12-06 VITALS
TEMPERATURE: 98.6 F | WEIGHT: 142 LBS | BODY MASS INDEX: 21.03 KG/M2 | OXYGEN SATURATION: 95 % | HEART RATE: 77 BPM | RESPIRATION RATE: 16 BRPM | SYSTOLIC BLOOD PRESSURE: 123 MMHG | DIASTOLIC BLOOD PRESSURE: 58 MMHG | HEIGHT: 69 IN

## 2019-12-06 DIAGNOSIS — J06.9 ACUTE UPPER RESPIRATORY INFECTION: ICD-10-CM

## 2019-12-06 DIAGNOSIS — J01.10 ACUTE FRONTAL SINUSITIS, RECURRENCE NOT SPECIFIED: Primary | ICD-10-CM

## 2019-12-06 PROCEDURE — 99214 OFFICE O/P EST MOD 30 MIN: CPT

## 2019-12-06 PROCEDURE — 99213 OFFICE O/P EST LOW 20 MIN: CPT | Performed by: NURSE PRACTITIONER

## 2019-12-06 RX ORDER — AZITHROMYCIN 250 MG/1
TABLET, FILM COATED ORAL
Qty: 6 TABLET | Refills: 0 | Status: SHIPPED | OUTPATIENT
Start: 2019-12-06 | End: 2019-12-13 | Stop reason: ALTCHOICE

## 2019-12-09 ASSESSMENT — ENCOUNTER SYMPTOMS
SHORTNESS OF BREATH: 0
STRIDOR: 0
COUGH: 1
SINUS PAIN: 1
SORE THROAT: 1
DIARRHEA: 0
VOMITING: 0
SINUS PRESSURE: 1
ABDOMINAL PAIN: 0
NAUSEA: 0
CHEST TIGHTNESS: 0
WHEEZING: 0

## 2019-12-13 ENCOUNTER — OFFICE VISIT (OUTPATIENT)
Dept: FAMILY MEDICINE CLINIC | Age: 64
End: 2019-12-13
Payer: COMMERCIAL

## 2019-12-13 VITALS
TEMPERATURE: 98 F | RESPIRATION RATE: 10 BRPM | WEIGHT: 145.2 LBS | DIASTOLIC BLOOD PRESSURE: 54 MMHG | SYSTOLIC BLOOD PRESSURE: 106 MMHG | HEART RATE: 88 BPM | BODY MASS INDEX: 21.44 KG/M2

## 2019-12-13 DIAGNOSIS — J01.90 ACUTE RHINOSINUSITIS: Primary | ICD-10-CM

## 2019-12-13 PROBLEM — Z15.89: Status: ACTIVE | Noted: 2019-12-13

## 2019-12-13 PROCEDURE — G8427 DOCREV CUR MEDS BY ELIG CLIN: HCPCS | Performed by: NURSE PRACTITIONER

## 2019-12-13 PROCEDURE — 1036F TOBACCO NON-USER: CPT | Performed by: NURSE PRACTITIONER

## 2019-12-13 PROCEDURE — G8420 CALC BMI NORM PARAMETERS: HCPCS | Performed by: NURSE PRACTITIONER

## 2019-12-13 PROCEDURE — G8484 FLU IMMUNIZE NO ADMIN: HCPCS | Performed by: NURSE PRACTITIONER

## 2019-12-13 PROCEDURE — 99213 OFFICE O/P EST LOW 20 MIN: CPT | Performed by: NURSE PRACTITIONER

## 2019-12-13 PROCEDURE — 3017F COLORECTAL CA SCREEN DOC REV: CPT | Performed by: NURSE PRACTITIONER

## 2019-12-13 RX ORDER — DOXYCYCLINE HYCLATE 100 MG
100 TABLET ORAL 2 TIMES DAILY
Qty: 20 TABLET | Refills: 0 | Status: SHIPPED | OUTPATIENT
Start: 2019-12-13 | End: 2019-12-23

## 2019-12-13 RX ORDER — LEVOTHYROXINE SODIUM 0.07 MG/1
TABLET ORAL
Refills: 1 | COMMUNITY
Start: 2019-12-09 | End: 2021-04-26 | Stop reason: SDUPTHER

## 2019-12-13 RX ORDER — PREDNISONE 20 MG/1
40 TABLET ORAL DAILY
Qty: 10 TABLET | Refills: 0 | Status: SHIPPED | OUTPATIENT
Start: 2019-12-13 | End: 2019-12-18

## 2019-12-17 ENCOUNTER — TELEPHONE (OUTPATIENT)
Dept: FAMILY MEDICINE CLINIC | Age: 64
End: 2019-12-17

## 2020-02-04 ENCOUNTER — TELEPHONE (OUTPATIENT)
Dept: FAMILY MEDICINE CLINIC | Age: 65
End: 2020-02-04

## 2020-02-04 ENCOUNTER — HOSPITAL ENCOUNTER (OUTPATIENT)
Age: 65
Discharge: HOME | End: 2020-02-04
Payer: COMMERCIAL

## 2020-02-04 ENCOUNTER — OFFICE VISIT (OUTPATIENT)
Dept: FAMILY MEDICINE CLINIC | Age: 65
End: 2020-02-04
Payer: COMMERCIAL

## 2020-02-04 ENCOUNTER — HOSPITAL ENCOUNTER (OUTPATIENT)
Dept: WOMENS IMAGING | Age: 65
Discharge: HOME | End: 2020-02-04
Payer: COMMERCIAL

## 2020-02-04 VITALS
DIASTOLIC BLOOD PRESSURE: 72 MMHG | TEMPERATURE: 98.4 F | HEIGHT: 70 IN | HEART RATE: 71 BPM | SYSTOLIC BLOOD PRESSURE: 110 MMHG | BODY MASS INDEX: 21.05 KG/M2 | RESPIRATION RATE: 14 BRPM | WEIGHT: 147 LBS

## 2020-02-04 LAB
ALBUMIN SERPL-MCNC: 4.4 G/DL (ref 3.5–5.1)
ALP BLD-CCNC: 67 U/L (ref 38–126)
ALT SERPL-CCNC: 16 U/L (ref 11–66)
ANION GAP SERPL CALCULATED.3IONS-SCNC: 13 MEQ/L (ref 8–16)
AST SERPL-CCNC: 19 U/L (ref 5–40)
AVERAGE GLUCOSE: 114 MG/DL (ref 70–126)
BILIRUB SERPL-MCNC: 0.3 MG/DL (ref 0.3–1.2)
BUN BLDV-MCNC: 8 MG/DL (ref 7–22)
C-REACTIVE PROTEIN: 0.14 MG/DL (ref 0–1)
CALCIUM SERPL-MCNC: 9.7 MG/DL (ref 8.5–10.5)
CHLORIDE BLD-SCNC: 105 MEQ/L (ref 98–111)
CHOLESTEROL, TOTAL: 220 MG/DL (ref 100–199)
CO2: 26 MEQ/L (ref 23–33)
CREAT SERPL-MCNC: 0.7 MG/DL (ref 0.4–1.2)
GFR SERPL CREATININE-BSD FRML MDRD: 84 ML/MIN/1.73M2
GLUCOSE BLD-MCNC: 100 MG/DL (ref 70–108)
HBA1C MFR BLD: 5.8 % (ref 4.4–6.4)
HDLC SERPL-MCNC: 53 MG/DL
LDL CHOLESTEROL CALCULATED: 136 MG/DL
MAGNESIUM: 2.1 MG/DL (ref 1.6–2.4)
POTASSIUM SERPL-SCNC: 4.3 MEQ/L (ref 3.5–5.2)
SODIUM BLD-SCNC: 144 MEQ/L (ref 135–145)
TOTAL PROTEIN: 7.3 G/DL (ref 6.1–8)
TRIGL SERPL-MCNC: 155 MG/DL (ref 0–199)
VITAMIN D 25-HYDROXY: 34 NG/ML (ref 30–100)

## 2020-02-04 PROCEDURE — G8420 CALC BMI NORM PARAMETERS: HCPCS | Performed by: NURSE PRACTITIONER

## 2020-02-04 PROCEDURE — 1036F TOBACCO NON-USER: CPT | Performed by: NURSE PRACTITIONER

## 2020-02-04 PROCEDURE — 83036 HEMOGLOBIN GLYCOSYLATED A1C: CPT

## 2020-02-04 PROCEDURE — 83735 ASSAY OF MAGNESIUM: CPT

## 2020-02-04 PROCEDURE — 80061 LIPID PANEL: CPT

## 2020-02-04 PROCEDURE — 99213 OFFICE O/P EST LOW 20 MIN: CPT | Performed by: NURSE PRACTITIONER

## 2020-02-04 PROCEDURE — G8484 FLU IMMUNIZE NO ADMIN: HCPCS | Performed by: NURSE PRACTITIONER

## 2020-02-04 PROCEDURE — 77063 BREAST TOMOSYNTHESIS BI: CPT

## 2020-02-04 PROCEDURE — 36415 COLL VENOUS BLD VENIPUNCTURE: CPT

## 2020-02-04 PROCEDURE — 86140 C-REACTIVE PROTEIN: CPT

## 2020-02-04 PROCEDURE — 3017F COLORECTAL CA SCREEN DOC REV: CPT | Performed by: NURSE PRACTITIONER

## 2020-02-04 PROCEDURE — G8427 DOCREV CUR MEDS BY ELIG CLIN: HCPCS | Performed by: NURSE PRACTITIONER

## 2020-02-04 PROCEDURE — 80053 COMPREHEN METABOLIC PANEL: CPT

## 2020-02-04 PROCEDURE — 82306 VITAMIN D 25 HYDROXY: CPT

## 2020-02-04 NOTE — TELEPHONE ENCOUNTER
Siri Granados /[facility] [employer] ([phone] option #3) called stating the pt is scheduled this afternoon for a mammo, however, the Dx code on the order will not pass for Medicare. Siri Granados states the Dx code needs to be changed & the code Z12.31 can be used & will pass Medicare. Siri Granados is asking that our ofc call them as soon as the code has been changed. Please advise.

## 2020-02-04 NOTE — PROGRESS NOTES
Chief Complaint   Patient presents with   Esperanza Perkins     left ear pain when blows nose- couple months     Other     phglem wont go away in throat; wants referral to ENT    H&P     no flu or pneumonia shot; will do mammo       History obtained from the patient. SUBJECTIVE:  Baltazar Espinal is a 59 y.o. female that presents today for ear pain and sinus congestion      Ear Complaint    HPI:  Symptoms have been present for 2 month(s). Complains of loads of PND for months. Worse since recent upper respiratory illness. She also complains of left ear pain ever since her most recent upper resp illness  Inciting incident or history of trauma? no  Decreased hearing? No  Ear tenderness? No  Ear drainage? No  Feeling of fullness? Yes  Radiation of the pain? No  Dizziness or pre-syncope? No  Affected by position or head movment? No  Sore throat, rhinorrhea or sinus congestion? Yes - some sinus congestion  Hx of Bruxism?  No  Treatment tried and response - Abx      Age/Gender Health Maintenance    Lipid -   Lab Results   Component Value Date    CHOL 201 (H) 08/24/2019    CHOL 254 (H) 06/03/2019    CHOL 172 03/02/2019     Lab Results   Component Value Date    TRIG 117 08/24/2019    TRIG 134 06/03/2019    TRIG 73 03/02/2019     Lab Results   Component Value Date    HDL 54 08/24/2019    HDL 57 06/03/2019    HDL 86 03/02/2019     Lab Results   Component Value Date    LDLCALC 124 08/24/2019    LDLCALC 170 06/03/2019    LDLCALC 71 03/02/2019     DM Screen -   Lab Results   Component Value Date    LABA1C 5.7 09/24/2018     Colon Cancer Screening - 2006  Lung Cancer Screening (Age 54 to [de-identified] with 30 pack year hx, current smoker or quit within past 15 years) - n/a    Tetanus - needs  Influenza Vaccine - yearly  Pneumonia Vaccine - 65  Zostavax - n/a     Breast Cancer Screening - 2/16  Cervical Cancer Screening - 10/15  Osteoporosis Screening - needs  Chlamydia Screen - n/a      Current Outpatient Medications   Medication Sig palpitations    Abnormal EKG    Intermittent claudication (HCC)    Pure hypercholesterolemia    Abnormal LFTs    Biallelic mutation of LPA gene       Past Medical History:   Diagnosis Date    Arthritis     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    Bradycardia     Cancer (HCC)     cervical    Chronic fatigue syndrome     COPD (chronic obstructive pulmonary disease) (HCC)     Degenerative cervical disc     Depression     Diverticulitis of colon     Fatigue     Fibromyalgia     Headache     Hiatal hernia     Hyperlipidemia     Postmenopausal osteoporosis 1/17/2017    Prolonged emergence from general anesthesia     Thoracic degenerative disc disease     Thoracic disc herniation     Thyroid disease        Past Surgical History:   Procedure Laterality Date    APPENDECTOMY  05/2017   [facility] CERVIX SURGERY      COLONOSCOPY      COLONOSCOPY  3/27/2019    COLONOSCOPY POLYPECTOMY SNARE/COLD BIOPSY performed by Hank Blanca MD at [place] [address]      UPPER GASTROINTESTINAL ENDOSCOPY  3/10/16    UPPER GASTROINTESTINAL ENDOSCOPY Left 3/27/2019    COLONOSCOPY SUBMUCOSAL/BOTOX INJECTION performed by Hank Bl[facility] MD at [facility]       Allergies   Allergen Reactions    Ciprofloxacin     Erythromycin Itching     Ointment for eyes - become irritated    Hydrocodone Itching    Lipitor [Atorvastatin] Other (See Comments)     ABNORMAL LIVER TESTS    Morphine     Percocet [Oxycodone-Acetaminophen] Itching    Robitussin Dm [Dextromethorphan-Guaifenesin]      Swelling of eyes    Sulfa Antibiotics Hives    Augmentin [Amoxicillin-Pot Clavulanate] Diarrhea and Nausea And Vomiting    Codeine Nausea And Vomiting    Pcn [Penicillins] Diarrhea and Nausea And Vomiting       Social History     Socioeconomic History    Marital status:      Spouse name: Not on file    Number of children: Not on file    Years of education: Not on file    Highest education level: Not on file Occupational History    Not on file   Social Needs    Financial resource strain: Not on file    Food insecurity:     Worry: Not on file     Inability: Not on file    Transportation needs:     Medical: Not on file     Non-medical: Not on file   Tobacco Use    Smoking status: Passive Smoke Exposure - Never Smoker    Smokeless tobacco: Never Used   Substance and Sexual Activity    Alcohol use: Yes     Alcohol/week: 0.0 standard drinks     Comment: rare at holidays    Drug use: No    Sexual activity: Not on file   Lifestyle    Physical activity:     Days per week: Not on file     Minutes per session: Not on file    Stress: Not on file   Relationships    Social connections:     Talks on phone: Not on file     Gets together: Not on file     Attends Confucianism service: Not on file     Active member of club or organization: Not on file     Attends meetings of clubs or organizations: Not on file     Relationship status: Not on file    Intimate partner violence:     Fear of current or ex partner: Not on file     Emotionally abused: Not on file     Physically abused: Not on file     Forced sexual activity: Not on file   Other Topics Concern    Not on file   Social History Narrative    Not on file       Family History   Problem Relation Age of Onset    Heart Disease Mother     Heart Disease Father     Other Sister         Torres's Esophagus    Other Other         Growth of esophagus    Diabetes Brother     Other Other         Unknown but did fall and hit head         I have reviewed the patient's past medical history, past surgical history, allergies, medications, social and family history and I have made updates where appropriate.       Review of Systems  Positive responses are highlighted in bold    Constitutional:  Fever, Chills, Night Sweats, Fatigue, Unexpected changes in weight  Eyes:  Eye discharge, Eye pain, Eye redness, Visual disturbances   HENT:  Ear pain, Tinnitus, Nosebleeds, Trouble swallowing, Hearing loss, Sore throat  Cardiovascular:  Chest Pain, Palpitations, Orthopnea, Paroxysmal Nocturnal Dyspnea  Respiratory:  Cough, Wheezing, Shortness of breath, Chest tightness, Apnea  Gastrointestinal:  Nausea, Vomiting, Diarrhea, Constipation, Heartburn, Blood in stool  Genitourinary:  Difficulty or painful urination, Flank pain, Change in frequency, Urgency  Skin:  Color change, Rash, Itching, Wound  Psychiatric:  Hallucinations, Anxiety, Depression, Suicidal ideation  Hematological:  Enlarged glands, Easy bleeding, Easily bruising  Musculoskeletal:  Joint pain, Back pain, Gait problems, Joint swelling, Myalgias  Neurological:  Dizziness, Headaches, Presyncope, Numbness, Seizures, Tremors  Allergy:  Environmental allergies, Food allergies  Endocrine:  Heat Intolerance, Cold Intolerance, Polydipsia, Polyphagia, Polyuria      Chemistry        Component Value Date/Time     03/29/2016 1230    K 4.2 03/29/2016 1230     03/29/2016 1230    CO2 28 03/29/2016 1230    BUN 11 03/08/2019 1642    CREATININE 0.7 03/08/2019 1642        Component Value Date/Time    CALCIUM 9.9 11/28/2018 1634    ALKPHOS 80 05/21/2019 1157    AST 16 05/21/2019 1157    ALT 12 05/21/2019 1157    BILITOT 0.4 05/21/2019 1157        Lab Results   Component Value Date    WBC 5.5 09/24/2018    HGB 13.2 09/24/2018    HCT 40.6 09/24/2018    MCV 92.3 09/24/2018     09/24/2018     Lab Results   Component Value Date    TSH 0.130 (L) 12/04/2019       PHYSICAL EXAM:  Vitals:    02/04/20 1026   BP: 110/72   Pulse: 71   Resp: 14   Temp: 98.4 °F (36.9 °C)   TempSrc: Oral   Weight: 147 lb (66.7 kg)   Height: 5' 9.5\" (1.765 m)     Body mass index is 21.4 kg/m².          VS Reviewed  General Appearance: A&O x 3, No acute distress,well developed and well- nourished  Head: normocephalic and atraumatic  Eyes: pupils equal, round, and reactive to light, extraocular eye movements intact, conjunctivae and eye lids without erythema  Neck: supple and non-tender without mass, no thyromegaly or thyroid nodules, no cervical lymphadenopathy  ENT: bilat ear canal and TM normal, oropharynx normal.  Pulmonary/Chest: clear to auscultation bilaterally- no wheezes, rales or rhonchi, normal air movement, no respiratory distress or retractions  Cardiovascular: S1 and S2 auscultated w/ RRR. No murmurs, rubs, clicks, or gallops, distal pulses intact. Abdomen: soft, non-tender, non-distended, bowl sounds physiologic,  no rebound or guarding, no masses or hernias noted. Liver and spleen without enlargement. Extremities: no cyanosis, clubbing or edema of the lower extremities. +2 PT/DP bilaterally. Musculoskeletal: No joint swelling or gross deformity   Neuro:  Alert, 5/5 strength globally and symmetrically  Psych: Affect and mood are normal  Skin: warm and dry, no rash or erythema  Lymph:  No cervical, auricular or supraclavicular lymph nodes palpated    ASSESSMENT & PLAN  Sanna Vazquez was seen today for otalgia, other and h&p. Diagnoses and all orders for this visit:    PND (post-nasal drip)  -     Kailash Slater MD, Otolaryngology, [facility] tube disorder, left  -     Kailash Slater MD, Otolaryngology, [email or web address]    Screening for breast cancer  -     ESTEPHANIE DIGITAL SCREEN W CAD BILATERAL; Future      - refer to ENT    DISPOSITION    Return if symptoms worsen or fail to improve. Sanna Vazquez released without restrictions. PATIENT COUNSELING    Counseling was provided today regarding the following topics: Healthy eating habits, Regular exercise, substance abuse and healthy sleep habits. Patient given educational materials on: See Attached    Barriers to learning and self management: none    Discussed use, benefit, and side effects of prescribed medications. Barriers to medication compliance addressed. All patient questions answered. Pt voiced understanding.        Electronically signed by LAURIE Vazquez CNP on 2/4/2020 at 10:45

## 2020-02-05 ENCOUNTER — TELEPHONE (OUTPATIENT)
Dept: FAMILY MEDICINE CLINIC | Age: 65
End: 2020-02-05

## 2020-02-05 NOTE — TELEPHONE ENCOUNTER
----- Message from LAURIE Gorman CNP sent at 2/5/2020  7:38 AM EST -----  Let Tootie Myles know her mammogram is normal, no evidence of malignancy.

## 2020-03-03 ENCOUNTER — HOSPITAL ENCOUNTER (OUTPATIENT)
Age: 65
Discharge: HOME | End: 2020-03-03
Payer: COMMERCIAL

## 2020-03-03 LAB
T4 FREE: 1.13 NG/DL (ref 0.93–1.76)
TSH SERPL DL<=0.05 MIU/L-ACNC: 2.59 UIU/ML (ref 0.4–4.2)

## 2020-03-03 PROCEDURE — 84439 ASSAY OF FREE THYROXINE: CPT

## 2020-03-03 PROCEDURE — 36415 COLL VENOUS BLD VENIPUNCTURE: CPT

## 2020-03-03 PROCEDURE — 84443 ASSAY THYROID STIM HORMONE: CPT

## 2020-03-18 RX ORDER — LEVALBUTEROL TARTRATE 45 UG/1
2 AEROSOL, METERED ORAL EVERY 6 HOURS PRN
Qty: 15 G | Refills: 2 | Status: SHIPPED | OUTPATIENT
Start: 2020-03-18 | End: 2020-09-22

## 2020-03-18 NOTE — TELEPHONE ENCOUNTER
Werner Lam called requesting a refill on the following medications:  Requested Prescriptions     Pending Prescriptions Disp Refills    levalbuterol (XOPENEX HFA) 45 MCG/ACT inhaler 15 g 2     Sig: Inhale 2 puffs into the lungs every 6 hours as needed for Wheezing     Pharmacy verified: [address]  .       Date of last visit: 2/4/20  Date of next visit (if applicable): Visit date not found

## 2020-05-29 RX ORDER — ORPHENADRINE CITRATE 100 MG/1
TABLET, EXTENDED RELEASE ORAL
Qty: 60 TABLET | Refills: 5 | Status: SHIPPED | OUTPATIENT
Start: 2020-05-29 | End: 2022-08-18 | Stop reason: SDUPTHER

## 2020-05-29 RX ORDER — ONDANSETRON 4 MG/1
TABLET, FILM COATED ORAL
Qty: 60 TABLET | Refills: 0 | Status: SHIPPED | OUTPATIENT
Start: 2020-05-29

## 2020-09-22 RX ORDER — LEVALBUTEROL TARTRATE 45 UG/1
2 AEROSOL, METERED ORAL EVERY 6 HOURS PRN
Qty: 15 G | Refills: 2 | Status: SHIPPED | OUTPATIENT
Start: 2020-09-22 | End: 2020-12-14

## 2020-11-23 ENCOUNTER — VIRTUAL VISIT (OUTPATIENT)
Dept: FAMILY MEDICINE CLINIC | Age: 65
End: 2020-11-23
Payer: COMMERCIAL

## 2020-11-23 PROCEDURE — G8428 CUR MEDS NOT DOCUMENT: HCPCS | Performed by: NURSE PRACTITIONER

## 2020-11-23 PROCEDURE — 99213 OFFICE O/P EST LOW 20 MIN: CPT | Performed by: NURSE PRACTITIONER

## 2020-11-23 PROCEDURE — 1123F ACP DISCUSS/DSCN MKR DOCD: CPT | Performed by: NURSE PRACTITIONER

## 2020-11-23 PROCEDURE — 3017F COLORECTAL CA SCREEN DOC REV: CPT | Performed by: NURSE PRACTITIONER

## 2020-11-23 PROCEDURE — 1090F PRES/ABSN URINE INCON ASSESS: CPT | Performed by: NURSE PRACTITIONER

## 2020-11-23 PROCEDURE — G8399 PT W/DXA RESULTS DOCUMENT: HCPCS | Performed by: NURSE PRACTITIONER

## 2020-11-23 PROCEDURE — 4040F PNEUMOC VAC/ADMIN/RCVD: CPT | Performed by: NURSE PRACTITIONER

## 2020-11-23 RX ORDER — NITROFURANTOIN 25; 75 MG/1; MG/1
100 CAPSULE ORAL 2 TIMES DAILY
Qty: 10 CAPSULE | Refills: 0 | Status: SHIPPED | OUTPATIENT
Start: 2020-11-23 | End: 2020-11-28

## 2020-11-23 ASSESSMENT — ENCOUNTER SYMPTOMS
COLOR CHANGE: 0
WHEEZING: 0
DIARRHEA: 0
RHINORRHEA: 0
NAUSEA: 0
ABDOMINAL PAIN: 0
COUGH: 0
EYE REDNESS: 0
TROUBLE SWALLOWING: 0
SORE THROAT: 0
VOMITING: 0
EYE PAIN: 0
SHORTNESS OF BREATH: 0

## 2020-11-23 NOTE — PROGRESS NOTES
2020    TELEHEALTH EVALUATION -- Audio/Visual (During LNMQD-86 public health emergency)    HPI:    Jayce Howell (:  1955) has requested an audio/video evaluation for the following concern(s):    Urinary Symptoms    HPI:    Symptoms present for 2 days. Symptoms are unchanged since they initially started. Dysuria? Yes  Hematuria? No  Increased urinary frequency? Yes  Abdominal discomfort? No  CVA pain? No  Hx of UTIs? No  Sexually active? No  LMP? No LMP recorded. Patient is postmenopausal.      Review of Systems   Constitutional: Negative for chills, diaphoresis and fatigue. HENT: Negative for congestion, rhinorrhea, sore throat and trouble swallowing. Eyes: Negative for pain, redness and visual disturbance. Respiratory: Negative for cough, shortness of breath and wheezing. Cardiovascular: Negative for chest pain, palpitations and leg swelling. Gastrointestinal: Negative for abdominal pain, diarrhea, nausea and vomiting. Endocrine: Negative for polydipsia, polyphagia and polyuria. Genitourinary: Positive for dysuria, frequency and urgency. Negative for decreased urine volume and hematuria. Musculoskeletal: Negative for arthralgias and myalgias. Skin: Negative for color change and rash. Allergic/Immunologic: Negative for environmental allergies, food allergies and immunocompromised state. Neurological: Negative for dizziness, tremors, syncope and headaches. Hematological: Negative for adenopathy. Psychiatric/Behavioral: Negative for behavioral problems, confusion, self-injury and suicidal ideas. All other systems reviewed and are negative. Prior to Visit Medications    Medication Sig Taking?  Authorizing Provider   nitrofurantoin, macrocrystal-monohydrate, (MACROBID) 100 MG capsule Take 1 capsule by mouth 2 times daily for 5 days Yes LAURIE Kaye - CNP   levalbuterol (XOPENEX HFA) 45 MCG/ACT inhaler INHALE 2 PUFFS INTO THE LUNGS EVERY 6 HOURS AS NEEDED FOR WHEEZING  LAURIE Rg CNP   ondansetron (ZOFRAN) 4 MG tablet TAKE 1 TABLET BY MOUTH THREE TIMES DAILY AS NEEDED FOR NAUSEA OR VOMITING  LAURIE Rg CNP   orphenadrine (NORFLEX) 100 MG extended release tablet TAKE 1 TABLET BY MOUTH TWICE DAILY AS NEEDED FOR MUSCLE SPASMS  LAURIE Rg CNP   levothyroxine (SYNTHROID) 75 MCG tablet TK 1 T PO 30 MIN BEFORE BREAKFAST ON AN EMPTY STOMACH WITH WATER. DO NOT TAKE WITH OTHER MEDICATIONS  Historical Provider, MD   aspirin 81 MG tablet Take 81 mg by mouth daily  Historical Provider, MD   cyclobenzaprine (FLEXERIL) 10 MG tablet Take 1 tablet by mouth 3 times daily as needed for Muscle spasms (chest wall pain)  LAURIE Rg CNP   lidocaine (LIDODERM) 5 % Place 1 patch onto the skin daily as needed for Pain 12 hours on, 12 hours off. LAURIE Rg CNP   diclofenac sodium (VOLTAREN) 1 % GEL Apply 2 g topically 4 times daily  LAURIE Rg CNP   NONFORMULARY Take 2 capsules by mouth daily OMEGA Q plus Resiverol from Dr. Mireya Galvin Provider, MD   acetaminophen (TYLENOL) 500 MG tablet Take 500 mg by mouth as needed for Pain  Historical Provider, MD   Magic Mouthwash (MIRACLE MOUTHWASH) Swish and spit 5 mls 4 times a day of equal parts Benadryl, Nystatin and Lidocaine. LAURIE Rg CNP   Menthol, Topical Analgesic, (PERFORM PAIN RELIEVING ROLL-ON EX) Apply topically as needed from the makers of Biofreeze (Menthol 3.5%)  Historical Provider, MD   vitamin D (CHOLECALCIFEROL) 1000 UNITS TABS tablet Take 1 tablet by mouth daily  Patient taking differently: Take 2,000 Units by mouth daily Vitamin D3  LAURIE Rg CNP   Multiple Vitamins-Minerals (CENTRUM SILVER PO) Take by mouth daily  Historical Provider, MD       Social History     Tobacco Use    Smoking status: Passive Smoke Exposure - Never Smoker    Smokeless tobacco: Never Used   Substance Use Topics    Alcohol use:  Yes Alcohol/week: 0.0 standard drinks     Comment: rare at holidays    Drug use: No        Allergies   Allergen Reactions    Ciprofloxacin     Erythromycin Itching     Ointment for eyes - become irritated    Hydrocodone Itching    Lipitor [Atorvastatin] Other (See Comments)     ABNORMAL LIVER TESTS    Morphine     Percocet [Oxycodone-Acetaminophen] Itching    Robitussin Dm [Dextromethorphan-Guaifenesin]      Swelling of eyes    Sulfa Antibiotics Hives    Augmentin [Amoxicillin-Pot Clavulanate] Diarrhea and Nausea And Vomiting    Codeine Nausea And Vomiting    Pcn [Penicillins] Diarrhea and Nausea And Vomiting   ,   Past Medical History:   Diagnosis Date    Arthritis     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    Bradycardia     Cancer (HCC)     cervical    Chronic fatigue syndrome     COPD (chronic obstructive pulmonary disease) (HCC)     Degenerative cervical disc     Depression     Diverticulitis of colon     Fatigue     Fibromyalgia     Headache     Hiatal hernia     Hyperlipidemia     Postmenopausal osteoporosis 1/17/2017    Prolonged emergence from general anesthesia     Thoracic degenerative disc disease     Thoracic disc herniation     Thyroid disease    ,   Past Surgical History:   Procedure Laterality Date    APPENDECTOMY  05/2017   [facility] CERVIX [facility]GERY      COLONOSCOPY      COLONOSCOPY  3/27/2019    COLONOSCOPY POLYPECTOMY SNARE/COLD BIOPSY performed by Priya Foreman MD at [place] [address]      UPPER GASTROINTESTINAL ENDOSCOPY  3/10/16    UPPER GASTROINTESTINAL ENDOSCOPY Left 3/27/2019    COLONOSCOPY SUBMUCOSAL/BOTOX INJECTION performed by Priya Foreman MD at [facility]   ,   Family History   Problem Relation Age of Onset    Heart Disease Mother     Heart Disease Father     Other Sister         Torres's Esophagus    Other Other         Growth of esophagus    Diabetes Brother     Other Other         Unknown but did fall and hit head   , Immunization History   Administered Date(s) Administered    Td, unspecified formulation 08/30/2016   ,   Health Maintenance   Topic Date Due    HIV screen  08/05/1970    DTaP/Tdap/Td vaccine (1 - Tdap) 08/05/1974    Shingles Vaccine (1 of 2) 08/05/2005    Cervical cancer screen  08/04/2020    Pneumococcal 65+ years Vaccine (1 of 1 - PPSV23) 08/05/2020    Flu vaccine (1) 09/01/2020    A1C test (Diabetic or Prediabetic)  02/04/2021    TSH testing  03/03/2021    Breast cancer screen  02/04/2022    Lipid screen  02/04/2025    Colon cancer screen colonoscopy  03/27/2029    DEXA (modify frequency per FRAX score)  Completed    Hepatitis C screen  Completed    Hepatitis A vaccine  Aged Out    Hepatitis B vaccine  Aged Out    Hib vaccine  Aged Out    Meningococcal (ACWY) vaccine  Aged Out       PHYSICAL EXAMINATION:  [ INSTRUCTIONS:  \"[x]\" Indicates a positive item  \"[]\" Indicates a negative item  -- DELETE ALL ITEMS NOT EXAMINED]  Vital Signs: (As obtained by patient/caregiver or practitioner observation)    Blood pressure-  Heart rate-    Respiratory rate-    Temperature-  Pulse oximetry-     Constitutional: [x] Appears well-developed and well-nourished [x] No apparent distress      [] Abnormal-   Mental status  [x] Alert and awake  [x] Oriented to person/place/time [x]Able to follow commands      Eyes:  EOM    [x]  Normal  [] Abnormal-  Sclera  [x]  Normal  [] Abnormal -         Discharge [x]  None visible  [] Abnormal -    HENT:   [x] Normocephalic, atraumatic.   [] Abnormal   [x] Mouth/Throat: Mucous membranes are moist.     External Ears [x] Normal  [] Abnormal-     Neck: [x] No visualized mass     Pulmonary/Chest: [x] Respiratory effort normal.  [x] No visualized signs of difficulty breathing or respiratory distress        [] Abnormal-      Musculoskeletal:   [x] Normal gait with no signs of ataxia         [x] Normal range of motion of neck        [] Abnormal-       Neurological:        [x] No Facial Asymmetry (Cranial nerve 7 motor function) (limited exam to video visit)          [x] No gaze palsy        [] Abnormal-         Skin:        [x] No significant exanthematous lesions or discoloration noted on facial skin         [] Abnormal-            Psychiatric:       [x] Normal Affect [x] No Hallucinations        [] Abnormal-     Other pertinent observable physical exam findings-     ASSESSMENT/PLAN:  1. Acute cystitis without hematuria  - will treat with Macrobid, allergic to Cipro and Sulfa  - f/u if no improvement and would definitely need urine culture  - nitrofurantoin, macrocrystal-monohydrate, (MACROBID) 100 MG capsule; Take 1 capsule by mouth 2 times daily for 5 days  Dispense: 10 capsule; Refill: 0      Return if symptoms worsen or fail to improve. Lisa Leggett is a 72 y.o. female being evaluated by a Virtual Visit (video visit) encounter to address concerns as mentioned above. A caregiver was present when appropriate. Due to this being a TeleHealth encounter (During NZF-55 public health emergency), evaluation of the following organ systems was limited: Vitals/Constitutional/EENT/Resp/CV/GI//MS/Neuro/Skin/Heme-Lymph-Imm. Pursuant to the emergency declaration under the [address] [place] [address] authority and the [employer] Act, this Virtual Visit was conducted with patient's (and/or legal guardian's) consent, to reduce the patient's risk of exposure to COVID-19 and provide necessary medical care. The patient (and/or legal guardian) has also been advised to contact this office for worsening conditions or problems, and seek emergency medical treatment and/or call 911 if deemed necessary. Services were provided through a video synchronous discussion virtually to substitute for in-person clinic visit. Patient and provider were located at their individual homes.     --LAURIE Moya - CNP on 11/23/2020 at 3:39 PM    An electronic signature was used to authenticate this note.

## 2020-12-14 RX ORDER — LEVALBUTEROL TARTRATE 45 MCG
HFA AEROSOL WITH ADAPTER (GRAM) INHALATION
Qty: 15 G | Refills: 2 | Status: SHIPPED | OUTPATIENT
Start: 2020-12-14 | End: 2021-09-07

## 2021-04-26 ENCOUNTER — OFFICE VISIT (OUTPATIENT)
Dept: FAMILY MEDICINE CLINIC | Age: 66
End: 2021-04-26
Payer: COMMERCIAL

## 2021-04-26 VITALS
RESPIRATION RATE: 10 BRPM | TEMPERATURE: 98 F | OXYGEN SATURATION: 98 % | HEIGHT: 69 IN | BODY MASS INDEX: 21.92 KG/M2 | SYSTOLIC BLOOD PRESSURE: 110 MMHG | WEIGHT: 148 LBS | DIASTOLIC BLOOD PRESSURE: 76 MMHG | HEART RATE: 74 BPM

## 2021-04-26 DIAGNOSIS — E55.9 VITAMIN D DEFICIENCY: ICD-10-CM

## 2021-04-26 DIAGNOSIS — M81.0 POSTMENOPAUSAL OSTEOPOROSIS: ICD-10-CM

## 2021-04-26 DIAGNOSIS — R73.03 PREDIABETES: ICD-10-CM

## 2021-04-26 DIAGNOSIS — E03.9 ACQUIRED HYPOTHYROIDISM: ICD-10-CM

## 2021-04-26 DIAGNOSIS — E78.00 PURE HYPERCHOLESTEROLEMIA: ICD-10-CM

## 2021-04-26 DIAGNOSIS — K59.00 CONSTIPATION, UNSPECIFIED CONSTIPATION TYPE: Primary | ICD-10-CM

## 2021-04-26 PROCEDURE — G8420 CALC BMI NORM PARAMETERS: HCPCS | Performed by: NURSE PRACTITIONER

## 2021-04-26 PROCEDURE — 99214 OFFICE O/P EST MOD 30 MIN: CPT | Performed by: NURSE PRACTITIONER

## 2021-04-26 PROCEDURE — 1090F PRES/ABSN URINE INCON ASSESS: CPT | Performed by: NURSE PRACTITIONER

## 2021-04-26 PROCEDURE — 1123F ACP DISCUSS/DSCN MKR DOCD: CPT | Performed by: NURSE PRACTITIONER

## 2021-04-26 PROCEDURE — G8427 DOCREV CUR MEDS BY ELIG CLIN: HCPCS | Performed by: NURSE PRACTITIONER

## 2021-04-26 PROCEDURE — 3017F COLORECTAL CA SCREEN DOC REV: CPT | Performed by: NURSE PRACTITIONER

## 2021-04-26 PROCEDURE — 4040F PNEUMOC VAC/ADMIN/RCVD: CPT | Performed by: NURSE PRACTITIONER

## 2021-04-26 PROCEDURE — 1036F TOBACCO NON-USER: CPT | Performed by: NURSE PRACTITIONER

## 2021-04-26 PROCEDURE — G8399 PT W/DXA RESULTS DOCUMENT: HCPCS | Performed by: NURSE PRACTITIONER

## 2021-04-26 RX ORDER — LEVOTHYROXINE SODIUM 0.07 MG/1
TABLET ORAL
Qty: 30 TABLET | Refills: 1 | OUTPATIENT
Start: 2021-04-26

## 2021-04-26 RX ORDER — LEVOTHYROXINE SODIUM 0.07 MG/1
TABLET ORAL
Qty: 30 TABLET | Refills: 0 | Status: SHIPPED | OUTPATIENT
Start: 2021-04-26 | End: 2021-05-12 | Stop reason: SDUPTHER

## 2021-04-26 RX ORDER — POLYETHYLENE GLYCOL 3350 17 G/17G
17 POWDER, FOR SOLUTION ORAL DAILY PRN
Qty: 510 G | Refills: 0 | Status: SHIPPED | OUTPATIENT
Start: 2021-04-26 | End: 2021-05-26

## 2021-04-26 SDOH — ECONOMIC STABILITY: INCOME INSECURITY: HOW HARD IS IT FOR YOU TO PAY FOR THE VERY BASICS LIKE FOOD, HOUSING, MEDICAL CARE, AND HEATING?: NOT HARD AT ALL

## 2021-04-27 ENCOUNTER — HOSPITAL ENCOUNTER (OUTPATIENT)
Age: 66
Discharge: HOME | End: 2021-04-27
Payer: COMMERCIAL

## 2021-04-27 ENCOUNTER — TELEPHONE (OUTPATIENT)
Dept: FAMILY MEDICINE CLINIC | Age: 66
End: 2021-04-27

## 2021-04-27 DIAGNOSIS — E78.00 PURE HYPERCHOLESTEROLEMIA: ICD-10-CM

## 2021-04-27 DIAGNOSIS — E03.9 ACQUIRED HYPOTHYROIDISM: ICD-10-CM

## 2021-04-27 DIAGNOSIS — E55.9 VITAMIN D DEFICIENCY: ICD-10-CM

## 2021-04-27 DIAGNOSIS — R73.03 PREDIABETES: ICD-10-CM

## 2021-04-27 DIAGNOSIS — M81.0 POSTMENOPAUSAL OSTEOPOROSIS: ICD-10-CM

## 2021-04-27 PROCEDURE — 80061 LIPID PANEL: CPT

## 2021-04-27 PROCEDURE — 82306 VITAMIN D 25 HYDROXY: CPT

## 2021-04-27 PROCEDURE — 83036 HEMOGLOBIN GLYCOSYLATED A1C: CPT

## 2021-04-27 PROCEDURE — 36415 COLL VENOUS BLD VENIPUNCTURE: CPT

## 2021-04-27 PROCEDURE — 80053 COMPREHEN METABOLIC PANEL: CPT

## 2021-04-27 PROCEDURE — 84443 ASSAY THYROID STIM HORMONE: CPT

## 2021-04-27 NOTE — TELEPHONE ENCOUNTER
Left detailed msg on [facility] requesting pt to call back to schedule an appt with Zenaida Dorantes to be seen in office. Ok per signed HIPAA.

## 2021-04-28 ENCOUNTER — TELEPHONE (OUTPATIENT)
Dept: FAMILY MEDICINE CLINIC | Age: 66
End: 2021-04-28

## 2021-04-28 LAB
ALBUMIN SERPL-MCNC: 4.6 G/DL (ref 3.5–5.1)
ALP BLD-CCNC: 67 U/L (ref 38–126)
ALT SERPL-CCNC: 12 U/L (ref 11–66)
ANION GAP SERPL CALCULATED.3IONS-SCNC: 12 MEQ/L (ref 8–16)
AST SERPL-CCNC: 22 U/L (ref 5–40)
AVERAGE GLUCOSE: 102 MG/DL (ref 70–126)
BILIRUB SERPL-MCNC: 0.4 MG/DL (ref 0.3–1.2)
BUN BLDV-MCNC: 11 MG/DL (ref 7–22)
CALCIUM SERPL-MCNC: 9.6 MG/DL (ref 8.5–10.5)
CHLORIDE BLD-SCNC: 105 MEQ/L (ref 98–111)
CHOLESTEROL, FASTING: 251 MG/DL (ref 100–199)
CO2: 25 MEQ/L (ref 23–33)
CREAT SERPL-MCNC: 0.7 MG/DL (ref 0.4–1.2)
GFR SERPL CREATININE-BSD FRML MDRD: 84 ML/MIN/1.73M2
GLUCOSE BLD-MCNC: 101 MG/DL (ref 70–108)
HBA1C MFR BLD: 5.4 % (ref 4.4–6.4)
HDLC SERPL-MCNC: 53 MG/DL
LDL CHOLESTEROL CALCULATED: 180 MG/DL
POTASSIUM SERPL-SCNC: 4 MEQ/L (ref 3.5–5.2)
SODIUM BLD-SCNC: 142 MEQ/L (ref 135–145)
TOTAL PROTEIN: 7.7 G/DL (ref 6.1–8)
TRIGLYCERIDE, FASTING: 90 MG/DL (ref 0–199)
TSH SERPL DL<=0.05 MIU/L-ACNC: 2.91 UIU/ML (ref 0.4–4.2)
VITAMIN D 25-HYDROXY: 46 NG/ML (ref 30–100)

## 2021-04-28 NOTE — TELEPHONE ENCOUNTER
----- Message from LAURIE Bear CNP sent at 4/28/2021  8:44 AM EDT -----  Let Ambika Ignacio know her labs are all within normal range, except for the cholesterol. It's pretty high. Total 251, . I know she is not interested in taking medications for the cholesterol, but in her situation, I would rec'd she possibly take niacin and red rice yeast which are actually OTC supplements. Dosing for niacin is 6296-8413 mg/day divided BID or TID. I'm happy to send Rx in if she wishes.           Also Rj Villa is wanting to see pt for her urinary problems

## 2021-04-28 NOTE — TELEPHONE ENCOUNTER
----- Message from LAURIE Dixon - CNP sent at 4/28/2021  8:44 AM EDT -----  Let Shadia Mane know her labs are all within normal range, except for the cholesterol. It's pretty high. Total 251, . I know she is not interested in taking medications for the cholesterol, but in her situation, I would rec'd she possibly take niacin and red rice yeast which are actually OTC supplements. Dosing for niacin is 5356-4642 mg/day divided BID or TID. I'm happy to send Rx in if she wishes.

## 2021-04-29 ENCOUNTER — TELEPHONE (OUTPATIENT)
Dept: FAMILY MEDICINE CLINIC | Age: 66
End: 2021-04-29

## 2021-04-29 DIAGNOSIS — E78.2 MIXED HYPERLIPIDEMIA: Primary | ICD-10-CM

## 2021-04-29 NOTE — TELEPHONE ENCOUNTER
Pt informed. She is wanting to know if the script for the Niacin would be for slow or fast acting Niacin. She has heard one or the other causes more flushing. She is also asking for the dosing on Red Yeast Rice?

## 2021-04-29 NOTE — TELEPHONE ENCOUNTER
She would like to know if she is   supposed to take both Niacin and Red yeast rice or one or the other? Please advise. Labs mailed to pt's home address.

## 2021-05-05 NOTE — TELEPHONE ENCOUNTER
Left detailed message on pt's mach with Dr Rutherford Libman previous message.   Ok per signed HIPAA

## 2021-05-12 DIAGNOSIS — E03.9 ACQUIRED HYPOTHYROIDISM: ICD-10-CM

## 2021-05-12 RX ORDER — LEVOTHYROXINE SODIUM 0.07 MG/1
TABLET ORAL
Qty: 90 TABLET | Refills: 3 | Status: SHIPPED | OUTPATIENT
Start: 2021-05-12 | End: 2022-04-07

## 2021-09-07 RX ORDER — LEVALBUTEROL TARTRATE 45 MCG
HFA AEROSOL WITH ADAPTER (GRAM) INHALATION
Qty: 15 G | Refills: 2 | Status: SHIPPED | OUTPATIENT
Start: 2021-09-07 | End: 2021-11-18

## 2021-09-14 ENCOUNTER — HOSPITAL ENCOUNTER (OUTPATIENT)
Dept: CT IMAGING | Age: 66
Discharge: HOME | End: 2021-09-14
Payer: COMMERCIAL

## 2021-09-14 DIAGNOSIS — K57.92 DIVERTICULITIS: ICD-10-CM

## 2021-09-14 PROCEDURE — 74176 CT ABD & PELVIS W/O CONTRAST: CPT

## 2021-09-14 PROCEDURE — 6360000004 HC RX CONTRAST MEDICATION: Performed by: INTERNAL MEDICINE

## 2021-09-14 RX ADMIN — IOHEXOL 50 ML: 240 INJECTION, SOLUTION INTRATHECAL; INTRAVASCULAR; INTRAVENOUS; ORAL at 11:41

## 2021-11-18 RX ORDER — LEVALBUTEROL TARTRATE 45 MCG
HFA AEROSOL WITH ADAPTER (GRAM) INHALATION
Qty: 15 G | Refills: 2 | Status: SHIPPED | OUTPATIENT
Start: 2021-11-18 | End: 2022-01-10 | Stop reason: SDUPTHER

## 2021-11-18 NOTE — TELEPHONE ENCOUNTER
Recent Visits  Date Type Provider Dept   04/26/21 Office Visit Sanjay Barnett, APRN - CNP [facility]   Showing recent visits within past 540 days with a meds authorizing provider and meeting all other requirements  Future Appointments  No visits were found meeting these conditions. Showing future appointments within next 150 days with a meds authorizing provider and meeting all other requirements    No future appointments.

## 2021-12-09 ENCOUNTER — HOSPITAL ENCOUNTER (OUTPATIENT)
Dept: GENERAL RADIOLOGY | Age: 66
Discharge: HOME | End: 2021-12-09
Payer: COMMERCIAL

## 2021-12-09 ENCOUNTER — HOSPITAL ENCOUNTER (OUTPATIENT)
Age: 66
Discharge: HOME | End: 2021-12-09
Payer: COMMERCIAL

## 2021-12-09 DIAGNOSIS — R06.02 SHORTNESS OF BREATH: ICD-10-CM

## 2021-12-09 PROCEDURE — 86631 CHLAMYDIA ANTIBODY: CPT

## 2021-12-09 PROCEDURE — 84165 PROTEIN E-PHORESIS SERUM: CPT

## 2021-12-09 PROCEDURE — 84155 ASSAY OF PROTEIN SERUM: CPT

## 2021-12-09 PROCEDURE — 86738 MYCOPLASMA ANTIBODY: CPT

## 2021-12-09 PROCEDURE — 71046 X-RAY EXAM CHEST 2 VIEWS: CPT

## 2021-12-09 PROCEDURE — 82103 ALPHA-1-ANTITRYPSIN TOTAL: CPT

## 2021-12-09 PROCEDURE — 36415 COLL VENOUS BLD VENIPUNCTURE: CPT

## 2021-12-09 PROCEDURE — 82787 IGG 1 2 3 OR 4 EACH: CPT

## 2021-12-09 PROCEDURE — 82104 ALPHA-1-ANTITRYPSIN PHENO: CPT

## 2021-12-09 PROCEDURE — 86632 CHLAMYDIA IGM ANTIBODY: CPT

## 2021-12-09 PROCEDURE — 82784 ASSAY IGA/IGD/IGG/IGM EACH: CPT

## 2021-12-09 PROCEDURE — 82785 ASSAY OF IGE: CPT

## 2021-12-09 PROCEDURE — 86769 SARS-COV-2 COVID-19 ANTIBODY: CPT

## 2021-12-10 ENCOUNTER — NURSE ONLY (OUTPATIENT)
Dept: LAB | Age: 66
End: 2021-12-10

## 2021-12-10 LAB
BASOPHILS # BLD: 0.9 %
BASOPHILS ABSOLUTE: 0.1 THOU/MM3 (ref 0–0.1)
EOSINOPHIL # BLD: 2.3 %
EOSINOPHILS ABSOLUTE: 0.1 THOU/MM3 (ref 0–0.4)
IGA: 287 MG/DL (ref 70–400)
IGE: 20 IU/ML
IGG: 944 MG/DL (ref 700–1600)
IGM: 94 MG/DL (ref 40–230)
IMMATURE GRANS (ABS): 0.01 THOU/MM3 (ref 0–0.07)
IMMATURE GRANULOCYTES: 0.2 %
LYMPHOCYTES # BLD: 38.2 %
LYMPHOCYTES ABSOLUTE: 2.2 THOU/MM3 (ref 1–4.8)
MONOCYTES # BLD: 6.7 %
MONOCYTES ABSOLUTE: 0.4 THOU/MM3 (ref 0.4–1.3)
NUCLEATED RED BLOOD CELLS: 0 /100 WBC
SARS-COV-2 ANTIBODY, TOTAL: NEGATIVE
SEG NEUTROPHILS: 51.7 %
SEGMENTED NEUTROPHILS ABSOLUTE COUNT: 2.9 THOU/MM3 (ref 1.8–7.7)

## 2021-12-15 LAB
IGG SUBCLASSES: NORMAL
MYCOPLASMA PNEUMONIAE IGG: NORMAL
MYCOPLASMA PNEUMONIAE IGM: NORMAL

## 2021-12-16 LAB
ALPHA-1 ANTITRYPSIN PHENOTYPE: NORMAL
ALPHA-1 ANTITRYPSIN: 145 MG/DL (ref 90–200)
CHLAMYDIA TRACHOMATIS IGG ANTIBODY: NORMAL
CHLAMYDIA TRACHOMATIS IGM ANTIBODY: NORMAL
PROTEIN ELECTROPHORESIS, SERUM: NORMAL

## 2022-01-03 ENCOUNTER — TELEPHONE (OUTPATIENT)
Dept: FAMILY MEDICINE CLINIC | Age: 67
End: 2022-01-03

## 2022-01-03 ENCOUNTER — VIRTUAL VISIT (OUTPATIENT)
Dept: FAMILY MEDICINE CLINIC | Age: 67
End: 2022-01-03
Payer: COMMERCIAL

## 2022-01-03 DIAGNOSIS — U07.1 COVID-19 VIRUS INFECTION: Primary | ICD-10-CM

## 2022-01-03 PROCEDURE — G8399 PT W/DXA RESULTS DOCUMENT: HCPCS | Performed by: NURSE PRACTITIONER

## 2022-01-03 PROCEDURE — 1090F PRES/ABSN URINE INCON ASSESS: CPT | Performed by: NURSE PRACTITIONER

## 2022-01-03 PROCEDURE — 1123F ACP DISCUSS/DSCN MKR DOCD: CPT | Performed by: NURSE PRACTITIONER

## 2022-01-03 PROCEDURE — 4040F PNEUMOC VAC/ADMIN/RCVD: CPT | Performed by: NURSE PRACTITIONER

## 2022-01-03 PROCEDURE — 99213 OFFICE O/P EST LOW 20 MIN: CPT | Performed by: NURSE PRACTITIONER

## 2022-01-03 PROCEDURE — G8428 CUR MEDS NOT DOCUMENT: HCPCS | Performed by: NURSE PRACTITIONER

## 2022-01-03 PROCEDURE — 3017F COLORECTAL CA SCREEN DOC REV: CPT | Performed by: NURSE PRACTITIONER

## 2022-01-03 ASSESSMENT — ENCOUNTER SYMPTOMS
DIARRHEA: 0
RHINORRHEA: 1
TROUBLE SWALLOWING: 0
SHORTNESS OF BREATH: 0
VOMITING: 0
WHEEZING: 0
COLOR CHANGE: 0
NAUSEA: 0
ABDOMINAL PAIN: 0
COUGH: 0
SORE THROAT: 1
EYE REDNESS: 0
EYE PAIN: 0

## 2022-01-03 NOTE — TELEPHONE ENCOUNTER
Patient calling stating that [facility] will be sending a fax over to be filled out that needs faxed back ASAP to schedule her for the monoclonal antibody infusion

## 2022-01-03 NOTE — PROGRESS NOTES
1/3/2022    TELEHEALTH EVALUATION -- Audio/Visual (During [facility]-52 public health emergency)    HPI:    Alice Oliver (:  1955) has requested an audio/video evaluation for the following concern(s):    She tested positive for COVID . Symptoms started  roughly. Has had some sore throat, fatigue, HA, fever, some nausea. Denies any SOB. Taking vitamin C, D and zinc and other OTC supplements. She is also on Doxy per Pulm. Her Pulmonologist did order the Monoclonal antibodies which they are working on arranging for her. She is taking Ivermectin. Review of Systems   Constitutional: Positive for fatigue and fever. Negative for chills and diaphoresis. HENT: Positive for congestion, rhinorrhea and sore throat. Negative for trouble swallowing. Eyes: Negative for pain, redness and visual disturbance. Respiratory: Negative for cough, shortness of breath and wheezing. Cardiovascular: Negative for chest pain, palpitations and leg swelling. Gastrointestinal: Negative for abdominal pain, diarrhea, nausea and vomiting. Endocrine: Negative for polydipsia, polyphagia and polyuria. Genitourinary: Negative for decreased urine volume, dysuria, frequency and urgency. Musculoskeletal: Negative for arthralgias and myalgias. Skin: Negative for color change and rash. Allergic/Immunologic: Negative for environmental allergies, food allergies and immunocompromised state. Neurological: Negative for dizziness, tremors, syncope and headaches. Hematological: Negative for adenopathy. Psychiatric/Behavioral: Negative for behavioral problems, confusion, self-injury and suicidal ideas. All other systems reviewed and are negative. Prior to Visit Medications    Medication Sig Taking?  Authorizing Provider   XOPENEX HFA 45 MCG/ACT inhaler INHALE 2 PUFFS INTO THE LUNGS EVERY 6 HOURS AS NEEDED FOR WHEEZING  Leander Eisenmenger, APRN - CNP   levothyroxine (SYNTHROID) 75 MCG tablet Take 1 tablet by mouth daily. LAURIE Luevano CNP   Coenzyme Q10-Red Yeast Rice  MG CAPS Take 1 capsule by mouth daily  LAURIE Schwartz CNP   ondansetron (ZOFRAN) 4 MG tablet TAKE 1 TABLET BY MOUTH THREE TIMES DAILY AS NEEDED FOR NAUSEA OR VOMITING  LAURIE Luevano CNP   orphenadrine (NORFLEX) 100 MG extended release tablet TAKE 1 TABLET BY MOUTH TWICE DAILY AS NEEDED FOR MUSCLE SPASMS  LAURIE Luevano CNP   aspirin 81 MG tablet Take 81 mg by mouth daily  Historical Provider, MD   cyclobenzaprine (FLEXERIL) 10 MG tablet Take 1 tablet by mouth 3 times daily as needed for Muscle spasms (chest wall pain)  LAURIE Luevano CNP   lidocaine (LIDODERM) 5 % Place 1 patch onto the skin daily as needed for Pain 12 hours on, 12 hours off. LAURIE Luevano CNP   diclofenac sodium (VOLTAREN) 1 % GEL Apply 2 g topically 4 times daily  LAURIE Luevano CNP   NONFORMULARY Take 2 capsules by mouth daily OMEGA Q plus Resiverol from Dr. Neal [place] Provider, MD   acetaminophen (TYLENOL) 500 MG tablet Take 500 mg by mouth as needed for Pain  Historical Provider, MD   Magic Mouthwash (MIRACLE MOUTHWASH) Swish and spit 5 mls 4 times a day of equal parts Benadryl, Nystatin and Lidocaine. LAURIE Luevano CNP   Menthol, Topical Analgesic, (PERFORM PAIN RELIEVING ROLL-ON EX) Apply topically as needed from the makers of Biofreeze (Menthol 3.5%)  Historical Provider, MD   vitamin D (CHOLECALCIFEROL) 1000 UNITS TABS tablet Take 1 tablet by mouth daily  Patient taking differently: Take 2,000 Units by mouth daily Vitamin D3  LAURIE Luevano CNP   Multiple Vitamins-Minerals (CENTRUM SILVER PO) Take by mouth daily  Historical Provider, MD       Social History     Tobacco Use    Smoking status: Passive Smoke Exposure - Never Smoker    Smokeless tobacco: Never Used   Vaping Use    Vaping Use: Never used   Substance Use Topics    Alcohol use:  Yes     Alcohol/week: 0.0 standard drinks Comment: rare at holidays    Drug use: No        Allergies   Allergen Reactions    Ciprofloxacin     Erythromycin Itching     Ointment for eyes - become irritated    Lipitor [Atorvastatin] Other (See Comments)     ABNORMAL LIVER TESTS    Percocet [Oxycodone-Acetaminophen] Itching    Robitussin Dm [Dextromethorphan-Guaifenesin]      Swelling of eyes    Sulfa Antibiotics Hives    Augmentin [Amoxicillin-Pot Clavulanate] Diarrhea and Nausea And Vomiting    Codeine Nausea And Vomiting    Pcn [Penicillins] Diarrhea and Nausea And Vomiting   ,   Past Medical History:   Diagnosis Date    Arthritis     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    Bradycardia     Cancer (HCC)     cervical    Chronic fatigue syndrome     COPD (chronic obstructive pulmonary disease) (HCC)     Degenerative cervical disc     Depression     Diverticulitis of colon     Fatigue     Fibromyalgia     Headache     Hiatal hernia     Hyperlipidemia     Postmenopausal osteoporosis 1/17/2017    Prolonged emergence from general anesthesia     Thoracic degenerative disc disease     Thoracic disc herniation     Thyroid disease    ,   Past Surgical History:   Procedure Laterality Date    [facility]  [facility]      COLONOSCOPY      COLONOSCOPY  3/27/2019    COLONOSCOPY POLYPECTOMY SNARE/COLD BIOPSY performed by April Neumann MD at [place] [address]      UPPER GASTROINTESTINAL ENDOSCOPY  3/10/16    UPPER GASTROINTESTINAL ENDOSCOPY Left 3/27/2019    COLONOSCOPY SUBMUCOSAL/BOTOX INJECTION performed by April Neumann MD at [facility]   ,   Family History   Problem Relation Age of Onset    Heart Disease Mother     Heart Disease Father     Other Sister         Torres's Esophagus    Other Other         Growth of esophagus    Diabetes Brother     Other Other         Unknown but did fall and hit head   ,   Immunization History   Administered Date(s) Administered    Td, unspecified formulation 08/30/2016   ,   Health Maintenance   Topic Date Due    COVID-19 Vaccine (1) Never done    Depression Monitoring  Never done    Shingles Vaccine (1 of 2) Never done    DTaP/Tdap/Td vaccine (1 - Tdap) 08/31/2016    Pneumococcal 65+ years Vaccine (1 of 1 - PPSV23) Never done    Flu vaccine (1) Never done    Breast cancer screen  02/04/2022    A1C test (Diabetic or Prediabetic)  04/27/2022    TSH testing  04/27/2022    Lipid screen  04/27/2026    Colon cancer screen colonoscopy  03/27/2029    DEXA (modify frequency per FRAX score)  Completed    Hepatitis C screen  Completed    Hepatitis A vaccine  Aged Out    Hepatitis B vaccine  Aged Out    Hib vaccine  Aged Out    Meningococcal (ACWY) vaccine  Aged Out       PHYSICAL EXAMINATION:  [ INSTRUCTIONS:  \"[x]\" Indicates a positive item  \"[]\" Indicates a negative item  -- DELETE ALL ITEMS NOT EXAMINED]  Vital Signs: (As obtained by patient/caregiver or practitioner observation)    Blood pressure-  Heart rate-    Respiratory rate-    Temperature-  Pulse oximetry-     Constitutional: [x] Appears well-developed and well-nourished [x] No apparent distress      [] Abnormal-   Mental status  [x] Alert and awake  [x] Oriented to person/place/time [x]Able to follow commands      Eyes:  EOM    [x]  Normal  [] Abnormal-  Sclera  [x]  Normal  [] Abnormal -         Discharge [x]  None visible  [] Abnormal -    HENT:   [x] Normocephalic, atraumatic.   [] Abnormal   [x] Mouth/Throat: Mucous membranes are moist.     External Ears [x] Normal  [] Abnormal-     Neck: [x] No visualized mass     Pulmonary/Chest: [x] Respiratory effort normal.  [x] No visualized signs of difficulty breathing or respiratory distress        [] Abnormal-      Musculoskeletal:   [x] Normal gait with no signs of ataxia         [x] Normal range of motion of neck        [] Abnormal-       Neurological:        [x] No Facial Asymmetry (Cranial nerve 7 motor function) (limited exam to video visit)          [x] No gaze palsy        [] Abnormal-         Skin:        [x] No significant exanthematous lesions or discoloration noted on facial skin         [] Abnormal-            Psychiatric:       [x] Normal Affect [x] No Hallucinations        [] Abnormal-     Other pertinent observable physical exam findings-     ASSESSMENT/PLAN:  1. COVID-19 virus infection  - con't OTC supplements  - pulmonologist arranging for monoclonal antibodies  - discussed benefits, risks and side effects    Return if symptoms worsen or fail to improve. Greta Duran is a 77 y.o. female being evaluated by a Virtual Visit (video visit) encounter to address concerns as mentioned above. A caregiver was present when appropriate. Due to this being a TeleHealth encounter (During [facility]-75 public health emergency), evaluation of the following organ systems was limited: Vitals/Constitutional/EENT/Resp/CV/GI//MS/Neuro/Skin/Heme-Lymph-Imm. Pursuant to the emergency declaration under the [address] [place] [address] authority and the [employer] Act, this Virtual Visit was conducted with patient's (and/or legal guardian's) consent, to reduce the patient's risk of exposure to COVID-19 and provide necessary medical care. The patient (and/or legal guardian) has also been advised to contact this office for worsening conditions or problems, and seek emergency medical treatment and/or call 911 if deemed necessary. Services were provided through a video synchronous discussion virtually to substitute for in-person clinic visit. Patient and provider were located at their individual homes. --LAURIE Hall - CNP on 1/3/2022 at 3:41 PM    An electronic signature was used to authenticate this note.

## 2022-01-03 NOTE — TELEPHONE ENCOUNTER
Please tell Marisaeladio Johnson that I got her message on the Perfect serve kvng, and we'll get it taken care of for the Monoclonal Antibodies.

## 2022-01-04 ENCOUNTER — PATIENT MESSAGE (OUTPATIENT)
Dept: FAMILY MEDICINE CLINIC | Age: 67
End: 2022-01-04

## 2022-01-04 DIAGNOSIS — R11.0 NAUSEA: ICD-10-CM

## 2022-01-04 DIAGNOSIS — R05.9 COUGH: Primary | ICD-10-CM

## 2022-01-04 NOTE — TELEPHONE ENCOUNTER
There is actually not a place on the form that requires when she tested positive. It just has how many days she has been sick. I left it at 4 which should still meet criteria.

## 2022-01-10 RX ORDER — BENZONATATE 100 MG/1
100-200 CAPSULE ORAL 3 TIMES DAILY PRN
Qty: 42 CAPSULE | Refills: 0 | Status: SHIPPED | OUTPATIENT
Start: 2022-01-10 | End: 2022-01-17

## 2022-01-10 RX ORDER — LEVALBUTEROL TARTRATE 45 UG/1
AEROSOL, METERED ORAL
Qty: 15 G | Refills: 2 | Status: SHIPPED | OUTPATIENT
Start: 2022-01-10 | End: 2022-04-04

## 2022-01-10 RX ORDER — PROMETHAZINE HYDROCHLORIDE 25 MG/1
25 TABLET ORAL 3 TIMES DAILY PRN
Qty: 12 TABLET | Refills: 0 | Status: SHIPPED | OUTPATIENT
Start: 2022-01-10 | End: 2022-01-17

## 2022-01-13 ENCOUNTER — OFFICE VISIT (OUTPATIENT)
Dept: FAMILY MEDICINE CLINIC | Age: 67
End: 2022-01-13
Payer: COMMERCIAL

## 2022-01-13 VITALS
HEART RATE: 72 BPM | BODY MASS INDEX: 21.13 KG/M2 | SYSTOLIC BLOOD PRESSURE: 104 MMHG | RESPIRATION RATE: 12 BRPM | WEIGHT: 139.4 LBS | OXYGEN SATURATION: 98 % | HEIGHT: 68 IN | TEMPERATURE: 98 F | DIASTOLIC BLOOD PRESSURE: 66 MMHG

## 2022-01-13 DIAGNOSIS — R05.9 COUGH: ICD-10-CM

## 2022-01-13 DIAGNOSIS — R09.82 PND (POST-NASAL DRIP): Primary | ICD-10-CM

## 2022-01-13 DIAGNOSIS — U07.1 COVID-19 VIRUS INFECTION: ICD-10-CM

## 2022-01-13 DIAGNOSIS — J16.0: ICD-10-CM

## 2022-01-13 PROCEDURE — 4040F PNEUMOC VAC/ADMIN/RCVD: CPT | Performed by: NURSE PRACTITIONER

## 2022-01-13 PROCEDURE — G8427 DOCREV CUR MEDS BY ELIG CLIN: HCPCS | Performed by: NURSE PRACTITIONER

## 2022-01-13 PROCEDURE — 99214 OFFICE O/P EST MOD 30 MIN: CPT | Performed by: NURSE PRACTITIONER

## 2022-01-13 PROCEDURE — 1036F TOBACCO NON-USER: CPT | Performed by: NURSE PRACTITIONER

## 2022-01-13 PROCEDURE — G8399 PT W/DXA RESULTS DOCUMENT: HCPCS | Performed by: NURSE PRACTITIONER

## 2022-01-13 PROCEDURE — 3017F COLORECTAL CA SCREEN DOC REV: CPT | Performed by: NURSE PRACTITIONER

## 2022-01-13 PROCEDURE — G8484 FLU IMMUNIZE NO ADMIN: HCPCS | Performed by: NURSE PRACTITIONER

## 2022-01-13 PROCEDURE — G8420 CALC BMI NORM PARAMETERS: HCPCS | Performed by: NURSE PRACTITIONER

## 2022-01-13 PROCEDURE — 1090F PRES/ABSN URINE INCON ASSESS: CPT | Performed by: NURSE PRACTITIONER

## 2022-01-13 PROCEDURE — 1123F ACP DISCUSS/DSCN MKR DOCD: CPT | Performed by: NURSE PRACTITIONER

## 2022-01-13 RX ORDER — IVERMECTIN 3 MG/1
18 TABLET ORAL ONCE
COMMUNITY

## 2022-01-13 RX ORDER — FLUTICASONE PROPIONATE 50 MCG
2 SPRAY, SUSPENSION (ML) NASAL DAILY
Qty: 1 EACH | Refills: 0 | Status: SHIPPED | OUTPATIENT
Start: 2022-01-13

## 2022-01-13 RX ORDER — DOXYCYCLINE HYCLATE 100 MG/1
100 CAPSULE ORAL 2 TIMES DAILY
COMMUNITY

## 2022-01-13 NOTE — PROGRESS NOTES
Chief Complaint   Patient presents with    Other     would like to follow up on her lungs        History obtained from the patient. SUBJECTIVE:  Tanisha Woodard is a 77 y.o. female that presents today for follow up [place]    Patient had covid and she is doing better, still having some shortness of breath and cough. She is using the Xopenex as needed and she does feel like it helps her. She called her pulmonologist and finally got on Medrol dose pack. She hasn't actually started the medrol dose pack yet. Her pulmonologist has also placed her Ivermectin. She is also doing listerine gargles and iodine nasal washes. She feels like she also has some PND in her throat which is also causing her cough.     She is on Doxycycline per his pulmonologist for chlamydia trachomatis pneumoniae    Age/Gender Health Maintenance    Lipid -   Lab Results   Component Value Date    CHOL 220 (H) 02/04/2020    CHOL 201 (H) 08/24/2019    CHOL 254 (H) 06/03/2019     Lab Results   Component Value Date    TRIG 155 02/04/2020    TRIG 117 08/24/2019    TRIG 134 06/03/2019     Lab Results   Component Value Date    HDL 53 04/27/2021    HDL 53 02/04/2020    HDL 54 08/24/2019     Lab Results   Component Value Date    LDLCALC 180 04/27/2021    LDLCALC 136 02/04/2020    [address] 08/24/2019     DM Screen -   Lab Results   Component Value Date    LABA1C 5.4 04/27/2021     Colon Cancer Screening - 2006  Lung Cancer Screening (Age 54 to [de-identified] with 30 pack year hx, current smoker or quit within past 15 years) - n/a    Tetanus - needs  Influenza Vaccine - yearly  Pneumonia Vaccine - 65  Zostavax - n/a     Breast Cancer Screening - 2/4/20  Cervical Cancer Screening - 10/15  Osteoporosis Screening - 1/15/19  Chlamydia Screen - n/a      Current Outpatient Medications   Medication Sig Dispense Refill    ivermectin 3 MG tablet Take 18 mg by mouth once weekly      doxycycline hyclate (VIBRAMYCIN) 100 MG capsule Take 100 mg by mouth 2 times daily  fluticasone (FLONASE) 50 MCG/ACT nasal spray 2 sprays by Each Nostril route daily 1 each 0    benzonatate (TESSALON) 100 MG capsule Take 1-2 capsules by mouth 3 times daily as needed for Cough 42 capsule 0    promethazine (PHENERGAN) 25 MG tablet Take 1 tablet by mouth 3 times daily as needed for Nausea 12 tablet 0    levalbuterol (XOPENEX HFA) 45 MCG/ACT inhaler INHALE 2 PUFFS INTO THE LUNGS EVERY 6 HOURS AS NEEDED FOR WHEEZING 15 g 2    levothyroxine (SYNTHROID) 75 MCG tablet Take 1 tablet by mouth daily. 90 tablet 3    Coenzyme Q10-Red Yeast Rice  MG CAPS Take 1 capsule by mouth daily 90 capsule 4    ondansetron (ZOFRAN) 4 MG tablet TAKE 1 TABLET BY MOUTH THREE TIMES DAILY AS NEEDED FOR NAUSEA OR VOMITING 60 tablet 0    orphenadrine (NORFLEX) 100 MG extended release tablet TAKE 1 TABLET BY MOUTH TWICE DAILY AS NEEDED FOR MUSCLE SPASMS 60 tablet 5    aspirin 81 MG tablet Take 81 mg by mouth daily      cyclobenzaprine (FLEXERIL) 10 MG tablet Take 1 tablet by mouth 3 times daily as needed for Muscle spasms (chest wall pain) 45 tablet 0    lidocaine (LIDODERM) 5 % Place 1 patch onto the skin daily as needed for Pain 12 hours on, 12 hours off. 30 patch 3    NONFORMULARY Take 2 capsules by mouth daily OMEGA Q plus Resiverol from Dr. Iesha Michelle acetaminophen (TYLENOL) 500 MG tablet Take 500 mg by mouth as needed for Pain      Menthol, Topical Analgesic, (PERFORM PAIN RELIEVING ROLL-ON EX) Apply topically as needed from the makers of Biofreeze (Menthol 3.5%)      diclofenac sodium (VOLTAREN) 1 % GEL Apply 2 g topically 4 times daily 5 Tube 2    vitamin D (CHOLECALCIFEROL) 1000 UNITS TABS tablet Take 1 tablet by mouth daily (Patient taking differently: Take 2,000 Units by mouth daily Vitamin D3) 30 tablet 5     No current facility-administered medications for this visit.      Orders Placed This Encounter   Medications    fluticasone (FLONASE) 50 MCG/ACT nasal spray     Si sprays by Each Nostril route daily     Dispense:  1 each     Refill:  0         All medications reviewed and reconciled, including OTC and herbal medications. Updated list given to patient.        Patient Active Problem List   Diagnosis    Sicca syndrome (HCC)    Chronic fatigue    Acquired hypothyroidism    COPD (chronic obstructive pulmonary disease) (HCC)    Chronic neck and back pain    Major depressive disorder, recurrent episode, moderate (HCC)    Gastroesophageal reflux disease with esophagitis    Left hip pain    Postmenopausal osteoporosis    SOB (shortness of breath) on exertion    History of palpitations    Abnormal EKG    Intermittent claudication (HCC)    Pure hypercholesterolemia    Abnormal LFTs    Biallelic mutation of LPA gene    Prediabetes       Past Medical History:   Diagnosis Date    Arthritis     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    Bradycardia     Cancer (HCC)     cervical    Chronic fatigue syndrome     COPD (chronic obstructive pulmonary disease) (HCC)     Degenerative cervical disc     Depression     Diverticulitis of colon     Fatigue     Fibromyalgia     Headache     Hiatal hernia     Hyperlipidemia     Postmenopausal osteoporosis 1/17/2017    Prolonged emergence from general anesthesia     Thoracic degenerative disc disease     Thoracic disc herniation     Thyroid disease        Past Surgical History:   Procedure Laterality Date    APPENDECTOMY  05/2017   [facility] CERVIX SURGERY      COLONOSCOPY      COLONOSCOPY  3/27/2019    COLONOSCOPY POLYPECTOMY SNARE/COLD BIOPSY performed by García Perez MD at [place] [address]      UPPER GASTROINTESTINAL ENDOSCOPY  3/10/16    UPPER GASTROINTESTINAL ENDOSCOPY Left 3/27/2019    COLONOSCOPY SUBMUCOSAL/BOTOX INJECTION performed by García Perez MD at [address] [facility]       Allergies   Allergen Reactions    Ciprofloxacin     Erythromycin Itching     Ointment for eyes - become irritated    Lipitor [Atorvastatin] Other (See Comments)     ABNORMAL LIVER TESTS    Percocet [Oxycodone-Acetaminophen] Itching    Robitussin Dm [Dextromethorphan-Guaifenesin]      Swelling of eyes    Sulfa Antibiotics Hives    Augmentin [Amoxicillin-Pot Clavulanate] Diarrhea and Nausea And Vomiting    Codeine Nausea And Vomiting    Pcn [Penicillins] Diarrhea and Nausea And Vomiting       Social History     Socioeconomic History    Marital status:      Spouse name: Not on file    Number of children: Not on file    Years of education: Not on file    Highest education level: Not on file   Occupational History    Not on file   Tobacco Use    Smoking status: Passive Smoke Exposure - Never Smoker    Smokeless tobacco: Never Used   Vaping Use    Vaping Use: Never used   Substance and Sexual Activity    Alcohol use: Yes     Alcohol/week: 0.0 standard drinks     Comment: rare at holidays    Drug use: No    Sexual activity: Not on file   Other Topics Concern    Not on file   Social History Narrative    Not on file     Social Determinants of Health     Financial Resource Strain: Low Risk     Difficulty of Paying Living Expenses: Not hard at all   Food Insecurity: No Food Insecurity    Worried About Running Out of Food in the Last Year: Never true    Anil of Food in the Last Year: Never true   Transportation Needs: No Transportation Needs    Lack of Transportation (Medical): No    Lack of Transportation (Non-Medical):  No   Physical Activity:     Days of Exercise per Week: Not on file    Minutes of Exercise per Session: Not on file   Stress:     Feeling of Stress : Not on file   Social Connections:     Frequency of Communication with Friends and Family: Not on file    Frequency of Social Gatherings with Friends and Family: Not on file    Attends Latter day Services: Not on file    Active Member of Clubs or Organizations: Not on file    Attends Club or Organization Meetings: Not on file    Marital Status: Not on file   Intimate Partner Violence:     Fear of Current or Ex-Partner: Not on file    Emotionally Abused: Not on file    Physically Abused: Not on file    Sexually Abused: Not on file   Housing Stability:     Unable to Pay for Housing in the Last Year: Not on file    Number of Places Lived in the Last Year: Not on file    Unstable Housing in the Last Year: Not on file       Family History   Problem Relation Age of Onset    Heart Disease Mother     Heart Disease Father     Other Sister         Torres's Esophagus    Other Other         Growth of esophagus    Diabetes Brother     Other Other         Unknown but did fall and hit head         I have reviewed the patient's past medical history, past surgical history, allergies, medications, social and family history and I have made updates where appropriate.       Review of Systems  Positive responses are highlighted in bold    Constitutional:  Fever, Chills, Night Sweats, Fatigue, Unexpected changes in weight  Eyes:  Eye discharge, Eye pain, Eye redness, Visual disturbances   HENT:  Ear pain, Tinnitus, Nosebleeds, Trouble swallowing, Hearing loss, Sore throat  Cardiovascular:  Chest Pain, Palpitations, Orthopnea, Paroxysmal Nocturnal Dyspnea  Respiratory:  Cough, Wheezing, Shortness of breath, Chest tightness, Apnea  Gastrointestinal:  Nausea, Vomiting, Diarrhea, Constipation, Heartburn, Blood in stool  Genitourinary:  Difficulty or painful urination, Flank pain, Change in frequency, Urgency  Skin:  Color change, Rash, Itching, Wound  Psychiatric:  Hallucinations, Anxiety, Depression, Suicidal ideation  Hematological:  Enlarged glands, Easy bleeding, Easily bruising  Musculoskeletal:  Joint pain, Back pain, Gait problems, Joint swelling, Myalgias  Neurological:  Dizziness, Headaches, Presyncope, Numbness, Seizures, Tremors  Allergy:  Environmental allergies, Food allergies  Endocrine:  Heat Intolerance, Cold Intolerance, Polydipsia, Polyphagia, Polyuria      Chemistry        Component Value Date/Time     04/27/2021 1806    K 4.0 04/27/2021 1806     04/27/2021 1806    CO2 25 04/27/2021 1806    BUN 11 04/27/2021 1806    CREATININE 0.7 04/27/2021 1806        Component Value Date/Time    CALCIUM 9.6 04/27/2021 1806    ALKPHOS 67 04/27/2021 1806    AST 22 04/27/2021 1806    ALT 12 04/27/2021 1806    BILITOT 0.4 04/27/2021 1806        Lab Results   Component Value Date    WBC 5.5 09/24/2018    HGB 13.2 09/24/2018    HCT 40.6 09/24/2018    MCV 92.3 09/24/2018     09/24/2018     Lab Results   Component Value Date    TSH 2.910 04/27/2021       PHYSICAL EXAM:  Vitals:    01/13/22 1644   BP: 104/66   Pulse: 72   Resp: 12   Temp: 98 °F (36.7 °C)   TempSrc: Oral   SpO2: 98%   Weight: 139 lb 6.4 oz (63.2 kg)   Height: 5' 8\" (1.727 m)     Body mass index is 21.2 kg/m². VS Reviewed  General Appearance: A&O x 3, No acute distress,well developed and well- nourished  Head: normocephalic and atraumatic  Eyes: pupils equal, round, and reactive to light, extraocular eye movements intact, conjunctivae and eye lids without erythema  Neck: supple and non-tender without mass, no thyromegaly or thyroid nodules, no cervical lymphadenopathy  Pulmonary/Chest: clear to auscultation bilaterally- no wheezes, rales or rhonchi, normal air movement, no respiratory distress or retractions  Cardiovascular: S1 and S2 auscultated w/ RRR. No murmurs, rubs, clicks, or gallops, distal pulses intact. Abdomen: soft, non-tender, non-distended, bowl sounds physiologic,  no rebound or guarding, no masses or hernias noted. Liver and spleen without enlargement. Extremities: no cyanosis, clubbing or edema of the lower extremities. +2 PT/DP bilaterally.    Musculoskeletal: No joint swelling or gross deformity   Neuro:  Alert, 5/5 strength globally and symmetrically  Psych: Affect and mood are normal  Skin: warm and dry, no rash or erythema  Lymph:  No cervical, auricular or supraclavicular lymph nodes palpated    ASSESSMENT & PLAN  Mayte Pepe was seen today for other. Diagnoses and all orders for this visit:    PND (post-nasal drip)  -     fluticasone (FLONASE) 50 MCG/ACT nasal spray; 2 sprays by Each Nostril route daily    COVID-19 virus infection  -     XR CHEST STANDARD (2 VW); Future    Cough  -     XR CHEST STANDARD (2 VW); Future    Pneumonia due to Chlamydia pneumoniae      - con't Doxy per pulm for chlamydial pneumoniae infection  - con't medrol dose pack and Xopenex as needed  - obtain f/u chest xray  - add flonase for PND    DISPOSITION    Return if symptoms worsen or fail to improve. Mayte Pepe released without restrictions. PATIENT COUNSELING    Counseling was provided today regarding the following topics: Healthy eating habits, Regular exercise, substance abuse and healthy sleep habits. Patient given educational materials on: See Attached    Barriers to learning and self management: none    Discussed use, benefit, and side effects of prescribed medications. Barriers to medication compliance addressed. All patient questions answered. Pt voiced understanding.        Electronically signed by LAURIE Park CNP on 1/13/2022 at 5:05 PM

## 2022-02-01 ENCOUNTER — HOSPITAL ENCOUNTER (OUTPATIENT)
Age: 67
Discharge: HOME | End: 2022-02-01
Payer: COMMERCIAL

## 2022-02-01 ENCOUNTER — HOSPITAL ENCOUNTER (OUTPATIENT)
Dept: GENERAL RADIOLOGY | Age: 67
Discharge: HOME | End: 2022-02-01
Payer: COMMERCIAL

## 2022-02-01 DIAGNOSIS — R05.9 COUGH: ICD-10-CM

## 2022-02-01 DIAGNOSIS — U07.1 COVID-19 VIRUS INFECTION: ICD-10-CM

## 2022-02-01 PROCEDURE — 71046 X-RAY EXAM CHEST 2 VIEWS: CPT

## 2022-02-02 ENCOUNTER — TELEPHONE (OUTPATIENT)
Dept: FAMILY MEDICINE CLINIC | Age: 67
End: 2022-02-02

## 2022-02-02 NOTE — TELEPHONE ENCOUNTER
----- Message from LAURIE Heart CNP sent at 2/1/2022  5:58 PM EST -----  Let Danyell Nazarion know her chest X-ray shows chronic lung disease findings consistent with asthma/COPD but nothing acute. No pneumonia.

## 2022-02-28 ENCOUNTER — HOSPITAL ENCOUNTER (OUTPATIENT)
Age: 67
Discharge: HOME | End: 2022-02-28
Payer: COMMERCIAL

## 2022-02-28 PROCEDURE — 86769 SARS-COV-2 COVID-19 ANTIBODY: CPT

## 2022-03-03 LAB — MISC. #1 REFERENCE GROUP TEST: ABNORMAL

## 2022-04-04 RX ORDER — LEVALBUTEROL TARTRATE 45 UG/1
AEROSOL, METERED ORAL
Qty: 15 G | Refills: 2 | Status: SHIPPED | OUTPATIENT
Start: 2022-04-04 | End: 2022-06-10

## 2022-04-07 DIAGNOSIS — E03.9 ACQUIRED HYPOTHYROIDISM: ICD-10-CM

## 2022-04-07 RX ORDER — LEVOTHYROXINE SODIUM 0.07 MG/1
TABLET ORAL
Qty: 90 TABLET | Refills: 3 | Status: SHIPPED | OUTPATIENT
Start: 2022-04-07

## 2022-04-14 ENCOUNTER — TELEPHONE (OUTPATIENT)
Dept: FAMILY MEDICINE CLINIC | Age: 67
End: 2022-04-14

## 2022-04-14 DIAGNOSIS — Z82.49 FAMILY HISTORY OF EARLY CAD: ICD-10-CM

## 2022-04-14 DIAGNOSIS — I65.23 BILATERAL CAROTID ARTERY STENOSIS: Primary | ICD-10-CM

## 2022-04-14 NOTE — TELEPHONE ENCOUNTER
Spoke with Aerial @ [facility] she will reach out to the pt and help her schedule the tests

## 2022-04-14 NOTE — TELEPHONE ENCOUNTER
Spoke to BLAS Burton at [employer] has told Tamela that the Doctor in [facility] wants a   CT cardiac calcium scoring and a Carotid bilateral.   Pt wants these test due to family heart issues     Tamela does not have the order and can not find the order. Pt asked Tamela if she could call [place]'s office so he could order them since the doctor in [place] has not sent the orders. Pt plans on moving to [place] due to her daughter living there.

## 2022-04-19 ENCOUNTER — HOSPITAL ENCOUNTER (OUTPATIENT)
Dept: PULMONOLOGY | Age: 67
Discharge: HOME | End: 2022-04-19
Payer: COMMERCIAL

## 2022-04-19 ENCOUNTER — APPOINTMENT (OUTPATIENT)
Dept: INTERVENTIONAL RADIOLOGY/VASCULAR | Age: 67
End: 2022-04-19
Payer: COMMERCIAL

## 2022-04-19 DIAGNOSIS — J42 CHRONIC BRONCHITIS, UNSPECIFIED CHRONIC BRONCHITIS TYPE (HCC): ICD-10-CM

## 2022-04-19 DIAGNOSIS — J45.909 INTRINSIC ASTHMA WITHOUT STATUS ASTHMATICUS WITHOUT COMPLICATION, UNSPECIFIED ASTHMA SEVERITY: ICD-10-CM

## 2022-04-19 PROCEDURE — 94729 DIFFUSING CAPACITY: CPT

## 2022-04-19 PROCEDURE — 94726 PLETHYSMOGRAPHY LUNG VOLUMES: CPT

## 2022-04-19 PROCEDURE — 94060 EVALUATION OF WHEEZING: CPT

## 2022-04-19 NOTE — PROGRESS NOTES
Prescreening performed prior to testing. The following symptoms may indicate COVID-19 infection:        One of the following criteria:   Temperature taken, patient temperature was 97.4 F. Fever greater 100.0 F -no  New onset cough -  no  New onset shortness of breath -no  New onset difficulty breathing -no        And/or   Two or more of the following criteria:  New onset muscle aches -no  New onset headache -no  New onset sore throat -no  New onset loss of smell/taste -no  New onset diarrhea -no    Patient's screening was negative. PFT will be performed.

## 2022-04-22 ENCOUNTER — TELEPHONE (OUTPATIENT)
Dept: FAMILY MEDICINE CLINIC | Age: 67
End: 2022-04-22

## 2022-04-22 ENCOUNTER — HOSPITAL ENCOUNTER (OUTPATIENT)
Dept: CT IMAGING | Age: 67
Discharge: HOME | End: 2022-04-22
Payer: COMMERCIAL

## 2022-04-22 ENCOUNTER — HOSPITAL ENCOUNTER (OUTPATIENT)
Dept: INTERVENTIONAL RADIOLOGY/VASCULAR | Age: 67
Discharge: HOME | End: 2022-04-22
Payer: COMMERCIAL

## 2022-04-22 DIAGNOSIS — I65.23 BILATERAL CAROTID ARTERY STENOSIS: ICD-10-CM

## 2022-04-22 DIAGNOSIS — Z82.49 FAMILY HISTORY OF EARLY CAD: ICD-10-CM

## 2022-04-22 PROCEDURE — 93880 EXTRACRANIAL BILAT STUDY: CPT

## 2022-04-22 PROCEDURE — 75571 CT HRT W/O DYE W/CA TEST: CPT

## 2022-04-22 NOTE — TELEPHONE ENCOUNTER
Adele Chavarria from [facility] calling to inform provider the th patients CT scan that was scheduled to be done today at  4 pm PA has been denied so the appt will be canceled

## 2022-04-25 ENCOUNTER — TELEPHONE (OUTPATIENT)
Dept: FAMILY MEDICINE CLINIC | Age: 67
End: 2022-04-25

## 2022-04-25 NOTE — TELEPHONE ENCOUNTER
Patient has been informed and voiced understanding and would like to have reports mailed to her home address

## 2022-04-25 NOTE — TELEPHONE ENCOUNTER
----- Message from LAURIE Aguirre CNP sent at 4/25/2022  8:57 AM EDT -----  Let Jb London know the CT cardiac calcium score indicates mild cardiac plaque, indicating mild mckee minimal cardiac disease.  The US of her carotids showed some mild stenosis, or narrowing of her right carotid artery, but very mild and nothing that needs any further testing or treatment

## 2022-05-27 ENCOUNTER — HOSPITAL ENCOUNTER (OUTPATIENT)
Age: 67
Discharge: HOME | End: 2022-05-27
Payer: COMMERCIAL

## 2022-05-27 LAB
ALBUMIN SERPL-MCNC: 4.6 G/DL (ref 3.5–5.1)
ALP BLD-CCNC: 80 U/L (ref 38–126)
ALT SERPL-CCNC: 12 U/L (ref 11–66)
ANION GAP SERPL CALCULATED.3IONS-SCNC: 9 MEQ/L (ref 8–16)
AST SERPL-CCNC: 16 U/L (ref 5–40)
BILIRUB SERPL-MCNC: 0.4 MG/DL (ref 0.3–1.2)
BILIRUBIN DIRECT: < 0.2 MG/DL (ref 0–0.3)
BUN BLDV-MCNC: 19 MG/DL (ref 7–22)
CALCIUM SERPL-MCNC: 9.7 MG/DL (ref 8.5–10.5)
CHLORIDE BLD-SCNC: 104 MEQ/L (ref 98–111)
CO2: 28 MEQ/L (ref 23–33)
CREAT SERPL-MCNC: 0.7 MG/DL (ref 0.4–1.2)
GFR SERPL CREATININE-BSD FRML MDRD: 84 ML/MIN/1.73M2
GLUCOSE BLD-MCNC: 89 MG/DL (ref 70–108)
PHOSPHORUS: 3.3 MG/DL (ref 2.4–4.7)
POTASSIUM SERPL-SCNC: 4.3 MEQ/L (ref 3.5–5.2)
SODIUM BLD-SCNC: 141 MEQ/L (ref 135–145)
TOTAL PROTEIN: 7.6 G/DL (ref 6.1–8)

## 2022-05-27 PROCEDURE — 86738 MYCOPLASMA ANTIBODY: CPT

## 2022-05-27 PROCEDURE — 84450 TRANSFERASE (AST) (SGOT): CPT

## 2022-05-27 PROCEDURE — 84460 ALANINE AMINO (ALT) (SGPT): CPT

## 2022-05-27 PROCEDURE — 82247 BILIRUBIN TOTAL: CPT

## 2022-05-27 PROCEDURE — 84075 ASSAY ALKALINE PHOSPHATASE: CPT

## 2022-05-27 PROCEDURE — 84155 ASSAY OF PROTEIN SERUM: CPT

## 2022-05-27 PROCEDURE — 82248 BILIRUBIN DIRECT: CPT

## 2022-05-27 PROCEDURE — 80069 RENAL FUNCTION PANEL: CPT

## 2022-05-27 PROCEDURE — 36415 COLL VENOUS BLD VENIPUNCTURE: CPT

## 2022-05-31 LAB
MYCOPLASMA PNEUMONIAE IGG: NORMAL
MYCOPLASMA PNEUMONIAE IGM: NORMAL

## 2022-06-03 ENCOUNTER — PATIENT MESSAGE (OUTPATIENT)
Dept: FAMILY MEDICINE CLINIC | Age: 67
End: 2022-06-03

## 2022-06-03 DIAGNOSIS — R73.03 PREDIABETES: ICD-10-CM

## 2022-06-03 DIAGNOSIS — E55.9 VITAMIN D DEFICIENCY: ICD-10-CM

## 2022-06-03 DIAGNOSIS — E03.9 ACQUIRED HYPOTHYROIDISM: Primary | ICD-10-CM

## 2022-06-06 ENCOUNTER — TELEPHONE (OUTPATIENT)
Dept: FAMILY MEDICINE CLINIC | Age: 67
End: 2022-06-06

## 2022-06-06 DIAGNOSIS — M81.0 POST-MENOPAUSAL OSTEOPOROSIS: Primary | ICD-10-CM

## 2022-06-06 NOTE — TELEPHONE ENCOUNTER
From: Zach Buckner  To: Carmen Rowe  Sent: 6/3/2022 3:07 PM EDT  Subject: Recommendation     Hi Leonardo. I would like to continue to see a top endrocrinologist. Dr Rosendo Dominguez who I was seeing retired during the pandemic so he is no longer an option for me. Also any bloodwork you may need for me that may be allowed by insurance. Pramod Zhou I dont know when I would get into a new endocrinologist, so an initial thyroid profile along with lipid panel, vit D, a1C, CBC or others may be due? I did however just have Dr Nya Aguilar (pulmonologist [facility]) do bloodwork that is on my chart on the computer. That was to check I think on maybe liver and kidney. Pramod Zhou ? ? ? (As well as recheck a lung infection in the bronchials I had been fighting last December) . Pramod Zhou Not sure if all the bloodwork that the hospital ran this time though matched what he originally ordered when he found the infection back then. Hope so. ...(didnt notice the word chlmidia (sp) as it pertains to lungs/bronchial infection) in this latest blood draw at the hospital... Hope to hear from you soon.      Thanks so much  [facility]

## 2022-06-06 NOTE — TELEPHONE ENCOUNTER
Can we find out what the order number is for this then? The only order I have ever used is the Mammo Dexa Scan.

## 2022-06-06 NOTE — TELEPHONE ENCOUNTER
Pt needs a new order for dexa scan @ [facility] hers has     [facility] new system will not except mammodexa/just dexa scan

## 2022-06-08 ENCOUNTER — HOSPITAL ENCOUNTER (OUTPATIENT)
Dept: WOMENS IMAGING | Age: 67
Discharge: HOME | End: 2022-06-08
Payer: COMMERCIAL

## 2022-06-08 DIAGNOSIS — Z12.31 BREAST CANCER SCREENING BY MAMMOGRAM: ICD-10-CM

## 2022-06-08 DIAGNOSIS — M81.0 POST-MENOPAUSAL OSTEOPOROSIS: ICD-10-CM

## 2022-06-08 PROCEDURE — 77080 DXA BONE DENSITY AXIAL: CPT

## 2022-06-08 PROCEDURE — 77067 SCR MAMMO BI INCL CAD: CPT

## 2022-06-10 ENCOUNTER — TELEPHONE (OUTPATIENT)
Dept: FAMILY MEDICINE CLINIC | Age: 67
End: 2022-06-10

## 2022-06-10 RX ORDER — LEVALBUTEROL TARTRATE 45 UG/1
AEROSOL, METERED ORAL
Qty: 15 G | Refills: 2 | Status: SHIPPED | OUTPATIENT
Start: 2022-06-10 | End: 2022-08-22

## 2022-06-10 NOTE — TELEPHONE ENCOUNTER
----- Message from LAURIE Nick CNP sent at 6/10/2022  9:32 AM EDT -----  Let Kim Gao know her mammogram is normal.

## 2022-06-10 NOTE — TELEPHONE ENCOUNTER
----- Message from LAURIE Zambrano CNP sent at 6/10/2022  7:39 AM EDT -----  Let Delaney Chapman know her dexa scan does show osteoporosis. She really needs to take medication for it. I had her on Fosamax for it, but looks like it got stopped. Is she not wanting to take anything for it?

## 2022-06-10 NOTE — TELEPHONE ENCOUNTER
Pt states she wants to wait on the medication at this time. She mentioned a natural pill called Algaecal. She wanted to know if [place] [facility] knew anything about this. She is going to ask her endocrinologist because it can affect your thyroid. She wanted copies of her mammogram and dexa. Mailed to home address.

## 2022-06-10 NOTE — TELEPHONE ENCOUNTER
Im not familiar with that supplement. But thats fine if she wants to hold off, and also discuss with endocrinology.

## 2022-07-08 ENCOUNTER — HOSPITAL ENCOUNTER (OUTPATIENT)
Age: 67
Discharge: HOME | End: 2022-07-08
Payer: COMMERCIAL

## 2022-07-08 DIAGNOSIS — E55.9 VITAMIN D DEFICIENCY: ICD-10-CM

## 2022-07-08 DIAGNOSIS — E03.9 ACQUIRED HYPOTHYROIDISM: ICD-10-CM

## 2022-07-08 DIAGNOSIS — R73.03 PREDIABETES: ICD-10-CM

## 2022-07-08 LAB
AVERAGE GLUCOSE: 96 MG/DL (ref 70–126)
CHOLESTEROL, FASTING: 254 MG/DL (ref 100–199)
HBA1C MFR BLD: 5.2 % (ref 4.4–6.4)
HDLC SERPL-MCNC: 58 MG/DL
LDL CHOLESTEROL CALCULATED: 168 MG/DL
T3 FREE: 2.61 PG/ML (ref 2.02–4.43)
T4 FREE: 1.35 NG/DL (ref 0.93–1.76)
TRIGLYCERIDE, FASTING: 138 MG/DL (ref 0–199)
TSH SERPL DL<=0.05 MIU/L-ACNC: 1.3 UIU/ML (ref 0.4–4.2)
VITAMIN D 25-HYDROXY: 73 NG/ML (ref 30–100)

## 2022-07-08 PROCEDURE — 86632 CHLAMYDIA IGM ANTIBODY: CPT

## 2022-07-08 PROCEDURE — 83036 HEMOGLOBIN GLYCOSYLATED A1C: CPT

## 2022-07-08 PROCEDURE — 80061 LIPID PANEL: CPT

## 2022-07-08 PROCEDURE — 84481 FREE ASSAY (FT-3): CPT

## 2022-07-08 PROCEDURE — 84439 ASSAY OF FREE THYROXINE: CPT

## 2022-07-08 PROCEDURE — 86376 MICROSOMAL ANTIBODY EACH: CPT

## 2022-07-08 PROCEDURE — 36415 COLL VENOUS BLD VENIPUNCTURE: CPT

## 2022-07-08 PROCEDURE — 82306 VITAMIN D 25 HYDROXY: CPT

## 2022-07-08 PROCEDURE — 84443 ASSAY THYROID STIM HORMONE: CPT

## 2022-07-08 PROCEDURE — 86631 CHLAMYDIA ANTIBODY: CPT

## 2022-07-10 LAB — THYROID PEROXIDASE ANTIBODY: 29 IU/ML (ref 0–25)

## 2022-07-11 ENCOUNTER — TELEPHONE (OUTPATIENT)
Dept: FAMILY MEDICINE CLINIC | Age: 67
End: 2022-07-11

## 2022-07-11 LAB
CHLAMYDIA TRACHOMATIS IGG ANTIBODY: NORMAL
CHLAMYDIA TRACHOMATIS IGM ANTIBODY: NORMAL

## 2022-07-11 NOTE — TELEPHONE ENCOUNTER
----- Message from LAURIE nKight CNP sent at 7/11/2022 10:00 AM EDT -----  Let Francisco Tang know her labs are stable and within appropriate ranges. The thyroid antibody test was positive indicating this is probably Hashimoto's disease which is an autoimmune disease affecting the thyroid. Its the most common cause of hypothyroidism. I see she is seeing endocrinology, so i'm fine for her to f/u with Dr Flaco Terrazas. Her cholesterol is quite high, I know she doesn't want to take medication for it though . If she is interested, let me know. Any other questions let me know.

## 2022-08-18 ENCOUNTER — OFFICE VISIT (OUTPATIENT)
Dept: FAMILY MEDICINE CLINIC | Age: 67
End: 2022-08-18
Payer: COMMERCIAL

## 2022-08-18 VITALS
SYSTOLIC BLOOD PRESSURE: 100 MMHG | DIASTOLIC BLOOD PRESSURE: 68 MMHG | BODY MASS INDEX: 21.28 KG/M2 | TEMPERATURE: 98.2 F | WEIGHT: 140.4 LBS | HEART RATE: 70 BPM | HEIGHT: 68 IN | OXYGEN SATURATION: 98 % | RESPIRATION RATE: 14 BRPM

## 2022-08-18 DIAGNOSIS — M79.7 FIBROMYALGIA: ICD-10-CM

## 2022-08-18 DIAGNOSIS — M54.2 CHRONIC NECK AND BACK PAIN: ICD-10-CM

## 2022-08-18 DIAGNOSIS — M54.16 LUMBAR RADICULOPATHY: Primary | ICD-10-CM

## 2022-08-18 DIAGNOSIS — M54.9 CHRONIC NECK AND BACK PAIN: ICD-10-CM

## 2022-08-18 DIAGNOSIS — G89.29 CHRONIC NECK AND BACK PAIN: ICD-10-CM

## 2022-08-18 PROCEDURE — G8399 PT W/DXA RESULTS DOCUMENT: HCPCS | Performed by: NURSE PRACTITIONER

## 2022-08-18 PROCEDURE — 1090F PRES/ABSN URINE INCON ASSESS: CPT | Performed by: NURSE PRACTITIONER

## 2022-08-18 PROCEDURE — 99214 OFFICE O/P EST MOD 30 MIN: CPT | Performed by: NURSE PRACTITIONER

## 2022-08-18 PROCEDURE — G8427 DOCREV CUR MEDS BY ELIG CLIN: HCPCS | Performed by: NURSE PRACTITIONER

## 2022-08-18 PROCEDURE — 1123F ACP DISCUSS/DSCN MKR DOCD: CPT | Performed by: NURSE PRACTITIONER

## 2022-08-18 PROCEDURE — 1036F TOBACCO NON-USER: CPT | Performed by: NURSE PRACTITIONER

## 2022-08-18 PROCEDURE — G8420 CALC BMI NORM PARAMETERS: HCPCS | Performed by: NURSE PRACTITIONER

## 2022-08-18 PROCEDURE — 96372 THER/PROPH/DIAG INJ SC/IM: CPT | Performed by: NURSE PRACTITIONER

## 2022-08-18 PROCEDURE — 3017F COLORECTAL CA SCREEN DOC REV: CPT | Performed by: NURSE PRACTITIONER

## 2022-08-18 RX ORDER — PREDNISONE 20 MG/1
40 TABLET ORAL DAILY
Qty: 10 TABLET | Refills: 0 | Status: SHIPPED | OUTPATIENT
Start: 2022-08-18 | End: 2022-08-23

## 2022-08-18 RX ORDER — METHYLPREDNISOLONE ACETATE 80 MG/ML
40 INJECTION, SUSPENSION INTRA-ARTICULAR; INTRALESIONAL; INTRAMUSCULAR; SOFT TISSUE ONCE
Status: COMPLETED | OUTPATIENT
Start: 2022-08-18 | End: 2022-08-18

## 2022-08-18 RX ORDER — KETOROLAC TROMETHAMINE 30 MG/ML
30 INJECTION, SOLUTION INTRAMUSCULAR; INTRAVENOUS ONCE
Status: COMPLETED | OUTPATIENT
Start: 2022-08-18 | End: 2022-08-18

## 2022-08-18 RX ORDER — ORPHENADRINE CITRATE 100 MG/1
TABLET, EXTENDED RELEASE ORAL
Qty: 60 TABLET | Refills: 1 | Status: SHIPPED | OUTPATIENT
Start: 2022-08-18

## 2022-08-18 RX ADMIN — KETOROLAC TROMETHAMINE 30 MG: 30 INJECTION, SOLUTION INTRAMUSCULAR; INTRAVENOUS at 11:56

## 2022-08-18 RX ADMIN — METHYLPREDNISOLONE ACETATE 40 MG: 80 INJECTION, SUSPENSION INTRA-ARTICULAR; INTRALESIONAL; INTRAMUSCULAR; SOFT TISSUE at 11:57

## 2022-08-18 ASSESSMENT — PATIENT HEALTH QUESTIONNAIRE - PHQ9
4. FEELING TIRED OR HAVING LITTLE ENERGY: 3
SUM OF ALL RESPONSES TO PHQ QUESTIONS 1-9: 6
8. MOVING OR SPEAKING SO SLOWLY THAT OTHER PEOPLE COULD HAVE NOTICED. OR THE OPPOSITE, BEING SO FIGETY OR RESTLESS THAT YOU HAVE BEEN MOVING AROUND A LOT MORE THAN USUAL: 0
2. FEELING DOWN, DEPRESSED OR HOPELESS: 0
SUM OF ALL RESPONSES TO PHQ QUESTIONS 1-9: 6
5. POOR APPETITE OR OVEREATING: 0
SUM OF ALL RESPONSES TO PHQ9 QUESTIONS 1 & 2: 0
7. TROUBLE CONCENTRATING ON THINGS, SUCH AS READING THE NEWSPAPER OR WATCHING TELEVISION: 0
3. TROUBLE FALLING OR STAYING ASLEEP: 3
1. LITTLE INTEREST OR PLEASURE IN DOING THINGS: 0
9. THOUGHTS THAT YOU WOULD BE BETTER OFF DEAD, OR OF HURTING YOURSELF: 0
SUM OF ALL RESPONSES TO PHQ QUESTIONS 1-9: 6
SUM OF ALL RESPONSES TO PHQ QUESTIONS 1-9: 6
6. FEELING BAD ABOUT YOURSELF - OR THAT YOU ARE A FAILURE OR HAVE LET YOURSELF OR YOUR FAMILY DOWN: 0

## 2022-08-18 NOTE — PROGRESS NOTES
Chief Complaint   Patient presents with    Leg Pain     Back side of left leg       History obtained from the patient. SUBJECTIVE:  Camron Bernabe is a 79 y.o. female that presents today for back pain    Complaint of low back pain for years, but has recently started having pain in her left low back, radiates down to her buttock and back of left leg. + numbness/tingling. Denies any pelvic analgesia or bowel/bladder incontinence. She is supposed to get on a plane tomorrow and is going to have some difficulty sitting for a long period of time. She does not want to take any medication for the osteoporosis. She is interested in doing aquatic therapy for her bone health and also for her chronic pain and fibromyalgia. Would like refill of Norflex as well.     Age/Gender Health Maintenance    Lipid -   Lab Results   Component Value Date    CHOL 220 (H) 02/04/2020    CHOL 201 (H) 08/24/2019    CHOL 254 (H) 06/03/2019     Lab Results   Component Value Date    TRIG 155 02/04/2020    TRIG 117 08/24/2019    TRIG 134 06/03/2019     Lab Results   Component Value Date    HDL 58 07/08/2022    HDL 53 04/27/2021    HDL 53 02/04/2020     Lab Results   Component Value Date    LDLCALC 168 07/08/2022    LDLCALC 180 04/27/2021    LDLCALC 136 02/04/2020     DM Screen -   Lab Results   Component Value Date    LABA1C 5.2 07/08/2022     Colon Cancer Screening - 2006  Lung Cancer Screening (Age 54 to [de-identified] with 30 pack year hx, current smoker or quit within past 15 years) - n/a    Tetanus - needs  Influenza Vaccine - yearly  Pneumonia Vaccine - 65  Zostavax - n/a     Breast Cancer Screening - 2/4/20  Cervical Cancer Screening - 10/15  Osteoporosis Screening - 1/15/19  Chlamydia Screen - n/a      Current Outpatient Medications   Medication Sig Dispense Refill    predniSONE (DELTASONE) 20 MG tablet Take 2 tablets by mouth daily for 5 days 10 tablet 0    orphenadrine (NORFLEX) 100 MG extended release tablet TAKE 1 TABLET BY MOUTH TWICE extended release tablet     Sig: TAKE 1 TABLET BY MOUTH TWICE DAILY AS NEEDED FOR MUSCLE SPASMS     Dispense:  60 tablet     Refill:  1           All medications reviewed and reconciled, including OTC and herbal medications. Updated list given to patient.        Patient Active Problem List   Diagnosis    Sicca syndrome (HCC)    Chronic fatigue    Acquired hypothyroidism    COPD (chronic obstructive pulmonary disease) (HCC)    Chronic neck and back pain    Major depressive disorder, recurrent episode, moderate (HCC)    Gastroesophageal reflux disease with esophagitis    Left hip pain    Postmenopausal osteoporosis    SOB (shortness of breath) on exertion    History of palpitations    Abnormal EKG    Intermittent claudication (HCC)    Pure hypercholesterolemia    Abnormal LFTs    Biallelic mutation of LPA gene    Prediabetes       Past Medical History:   Diagnosis Date    Arthritis     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    Bradycardia     Cancer (HCC)     cervical    Chronic fatigue syndrome     COPD (chronic obstructive pulmonary disease) (HCC)     Degenerative cervical disc     Depression     Diverticulitis of colon     Fatigue     Fibromyalgia     Headache     Hiatal hernia     Hyperlipidemia     Postmenopausal osteoporosis 1/17/2017    Prolonged emergence from general anesthesia     Thoracic degenerative disc disease     Thoracic disc herniation     Thyroid disease        Past Surgical History:   Procedure Laterality Date    APPENDECTOMY  05/2017    CERVIX SURGERY      COLONOSCOPY      COLONOSCOPY  3/27/2019    COLONOSCOPY POLYPECTOMY SNARE/COLD BIOPSY performed by Donna Whelan MD at [address] [place] [address] ENDOSCOPY  3/10/16    UPPER GASTROINTESTINAL ENDOSCOPY Left 3/27/2019    COLONOSCOPY SUBMUCOSAL/BOTOX INJECTION performed by Donna Whelan MD at [facility]       Allergies   Allergen Reactions    Ciprofloxacin     Erythromycin Itching     Ointment for eyes - become irritated    Lipitor [Atorvastatin] Other (See Comments)     ABNORMAL LIVER TESTS    Percocet [Oxycodone-Acetaminophen] Itching    Robitussin Dm [Dextromethorphan-Guaifenesin]      Swelling of eyes    Sulfa Antibiotics Hives    Augmentin [Amoxicillin-Pot Clavulanate] Diarrhea and Nausea And Vomiting    Codeine Nausea And Vomiting    Pcn [Penicillins] Diarrhea and Nausea And Vomiting       Social History     Socioeconomic History    Marital status:      Spouse name: Not on file    Number of children: Not on file    Years of education: Not on file    Highest education level: Not on file   Occupational History    Not on file   Tobacco Use    Smoking status: Never     Passive exposure: Yes    Smokeless tobacco: Never   Vaping Use    Vaping Use: Never used   Substance and Sexual Activity    Alcohol use: Never     Comment: rare at holidays    Drug use: No    Sexual activity: Not on file   Other Topics Concern    Not on file   Social History Narrative    Not on file     Social Determinants of Health     Financial Resource Strain: Not on file   Food Insecurity: Not on file   Transportation Needs: Not on file   Physical Activity: Not on file   Stress: Not on file   Social Connections: Not on file   Intimate Partner Violence: Not on file   Housing Stability: Not on file       Family History   Problem Relation Age of Onset    Heart Disease Mother     Heart Disease Father     Other Sister         Torres's Esophagus    Other Other         Growth of esophagus    Diabetes Brother     Other Other         Unknown but did fall and hit head    Breast Cancer Maternal Aunt 79         I have reviewed the patient's past medical history, past surgical history, allergies, medications, social and family history and I have made updates where appropriate.       Review of Systems  Positive responses are highlighted in bold    Constitutional:  Fever, Chills, Night Sweats, Fatigue, Unexpected changes in weight  Eyes:  Eye discharge, Eye pain, Eye redness, Visual disturbances   HENT:  Ear pain, Tinnitus, Nosebleeds, Trouble swallowing, Hearing loss, Sore throat  Cardiovascular:  Chest Pain, Palpitations, Orthopnea, Paroxysmal Nocturnal Dyspnea  Respiratory:  Cough, Wheezing, Shortness of breath, Chest tightness, Apnea  Gastrointestinal:  Nausea, Vomiting, Diarrhea, Constipation, Heartburn, Blood in stool  Genitourinary:  Difficulty or painful urination, Flank pain, Change in frequency, Urgency  Skin:  Color change, Rash, Itching, Wound  Psychiatric:  Hallucinations, Anxiety, Depression, Suicidal ideation  Hematological:  Enlarged glands, Easy bleeding, Easily bruising  Musculoskeletal:  Joint pain, Back pain, Gait problems, Joint swelling, Myalgias  Neurological:  Dizziness, Headaches, Presyncope, Numbness, Seizures, Tremors  Allergy:  Environmental allergies, Food allergies  Endocrine:  Heat Intolerance, Cold Intolerance, Polydipsia, Polyphagia, Polyuria      Chemistry        Component Value Date/Time     05/27/2022 1348    K 4.3 05/27/2022 1348     05/27/2022 1348    CO2 28 05/27/2022 1348    BUN 19 05/27/2022 1348    CREATININE 0.7 05/27/2022 1348        Component Value Date/Time    CALCIUM 9.7 05/27/2022 1348    ALKPHOS 80 05/27/2022 1348    AST 16 05/27/2022 1348    ALT 12 05/27/2022 1348    BILITOT 0.4 05/27/2022 1348        Lab Results   Component Value Date    WBC 5.5 09/24/2018    HGB 13.2 09/24/2018    HCT 40.6 09/24/2018    MCV 92.3 09/24/2018     09/24/2018     Lab Results   Component Value Date    TSH 1.300 07/08/2022       PHYSICAL EXAM:  Vitals:    08/18/22 1124   BP: 100/68   Pulse: 70   Resp: 14   Temp: 98.2 °F (36.8 °C)   TempSrc: Oral   SpO2: 98%   Weight: 140 lb 6.4 oz (63.7 kg)   Height: 5' 8\" (1.727 m)     Body mass index is 21.35 kg/m².          VS Reviewed  General Appearance: A&O x 3, No acute distress,well developed and well- nourished  Head: normocephalic and atraumatic  Eyes: pupils equal, round, and reactive to light, extraocular eye movements intact, conjunctivae and eye lids without erythema  Neck: supple and non-tender without mass, no thyromegaly or thyroid nodules, no cervical lymphadenopathy  Pulmonary/Chest: clear to auscultation bilaterally- no wheezes, rales or rhonchi, normal air movement, no respiratory distress or retractions  Cardiovascular: S1 and S2 auscultated w/ RRR. No murmurs, rubs, clicks, or gallops, distal pulses intact. Abdomen: soft, non-tender, non-distended, bowl sounds physiologic,  no rebound or guarding, no masses or hernias noted. Liver and spleen without enlargement. Extremities: no cyanosis, clubbing or edema of the lower extremities. +2 PT/DP bilaterally. Musculoskeletal: No joint swelling or gross deformity   Lumbar: + left sided back pain, + straight leg maneuver left leg, patellar reflexes 2+, strength 5/5 bilaterally, neurovascularly intact  Neuro:  Alert, 5/5 strength globally and symmetrically  Psych: Affect and mood are normal  Skin: warm and dry, no rash or erythema  Lymph:  No cervical, auricular or supraclavicular lymph nodes palpated    ASSESSMENT & PLAN  Elysia was seen today for leg pain. Diagnoses and all orders for this visit:    Lumbar radiculopathy  -     ketorolac (TORADOL) injection 30 mg  -     methylPREDNISolone acetate (DEPO-MEDROL) injection 40 mg  -     predniSONE (DELTASONE) 20 MG tablet;  Take 2 tablets by mouth daily for 5 days  -     [facility]  -     orphenadrine (NORFLEX) 100 MG extended release tablet; TAKE 1 TABLET BY MOUTH TWICE DAILY AS NEEDED FOR MUSCLE SPASMS    Chronic neck and back pain  -     [facility]  -     orphenadrine (NORFLEX) 100 MG extended release tablet; TAKE 1 TABLET BY MOUTH TWICE DAILY AS NEEDED FOR MUSCLE SPASMS    Fibromyalgia  -     [facility]  -     orphenadrine (NORFLEX) 100 MG extended release tablet; TAKE 1 TABLET BY MOUTH TWICE DAILY AS NEEDED FOR MUSCLE SPASMS    - will treat acute exacerbation of lumbar radiculopathy with Toradol and prednisone  - refer to PT as well for lumbar radiculopathy and her other chronic pain issues  - refill Norflex    DISPOSITION    Return in about 3 months (around 11/18/2022) for AWV. Deion Song released without restrictions. PATIENT COUNSELING    Counseling was provided today regarding the following topics: Healthy eating habits, Regular exercise, substance abuse and healthy sleep habits. Patient given educational materials on: See Attached    Barriers to learning and self management: none    Discussed use, benefit, and side effects of prescribed medications. Barriers to medication compliance addressed. All patient questions answered. Pt voiced understanding.        Electronically signed by LAURIE Sousa CNP on 8/18/2022 at 11:47 AM

## 2022-08-22 RX ORDER — LEVALBUTEROL TARTRATE 45 UG/1
AEROSOL, METERED ORAL
Qty: 15 G | Refills: 2 | Status: SHIPPED | OUTPATIENT
Start: 2022-08-22

## 2022-08-22 NOTE — TELEPHONE ENCOUNTER
Recent Visits  Date Type Provider Dept   08/18/22 Office Visit LAURIE Davis CNP [facility]   01/13/22 Office Visit LAURIE Davis CNP [facility]   04/26/21 Office Visit LAURIE Davis CNP [facility]   Showing recent visits within past 540 days with a meds authorizing provider and meeting all other requirements  Future Appointments  Date Type Provider Dept   11/21/22 Appointment LAURIE Davis CNP [facility]   Showing future appointments within next 150 days with a meds authorizing provider and meeting all other requirements     Future Appointments   Date Time Provider Luiz Nieves   9/22/2022  2:15 PM Delphine Dunaway MD [facility]   11/21/2022  2:00 PM LAURIE Davis

## 2022-09-30 ENCOUNTER — TRANSCRIBE ORDERS (OUTPATIENT)
Dept: ADMINISTRATIVE | Age: 67
End: 2022-09-30

## 2022-09-30 DIAGNOSIS — E04.1 THYROID NODULE: Primary | ICD-10-CM

## 2022-10-03 ENCOUNTER — HOSPITAL ENCOUNTER (OUTPATIENT)
Age: 67
Discharge: HOME | End: 2022-10-03
Payer: COMMERCIAL

## 2022-10-03 ENCOUNTER — HOSPITAL ENCOUNTER (OUTPATIENT)
Dept: ULTRASOUND IMAGING | Age: 67
Discharge: HOME | End: 2022-10-03
Payer: COMMERCIAL

## 2022-10-03 DIAGNOSIS — E04.1 THYROID NODULE: ICD-10-CM

## 2022-10-03 PROCEDURE — 76536 US EXAM OF HEAD AND NECK: CPT

## 2022-10-03 PROCEDURE — 84403 ASSAY OF TOTAL TESTOSTERONE: CPT

## 2022-10-03 PROCEDURE — 84270 ASSAY OF SEX HORMONE GLOBUL: CPT

## 2022-10-03 PROCEDURE — 36415 COLL VENOUS BLD VENIPUNCTURE: CPT

## 2022-10-03 PROCEDURE — 84402 ASSAY OF FREE TESTOSTERONE: CPT

## 2022-10-03 PROCEDURE — 83498 ASY HYDROXYPROGESTERONE 17-D: CPT

## 2022-10-03 PROCEDURE — 82627 DEHYDROEPIANDROSTERONE: CPT

## 2022-10-05 LAB — DHEAS (DHEA SULFATE): 86.3 UG/DL (ref 13–130)

## 2022-10-09 LAB — 17-HYDROXYPROGESTERONE: 31.37 NG/DL

## 2022-10-13 LAB — TESTOSTERONE, FREE W SHGB, FEMALES/CHILDREN: NORMAL

## 2022-10-28 ENCOUNTER — HOSPITAL ENCOUNTER (OUTPATIENT)
Age: 67
Discharge: HOME | End: 2022-10-28
Payer: COMMERCIAL

## 2022-10-28 DIAGNOSIS — L65.9 HAIR LOSS: ICD-10-CM

## 2022-10-28 DIAGNOSIS — E03.9 ACQUIRED HYPOTHYROIDISM: ICD-10-CM

## 2022-10-28 LAB
T4 FREE: 1.43 NG/DL (ref 0.93–1.76)
TSH SERPL DL<=0.05 MIU/L-ACNC: 2.51 UIU/ML (ref 0.4–4.2)

## 2022-10-28 PROCEDURE — 84270 ASSAY OF SEX HORMONE GLOBUL: CPT

## 2022-10-28 PROCEDURE — 84403 ASSAY OF TOTAL TESTOSTERONE: CPT

## 2022-10-28 PROCEDURE — 84439 ASSAY OF FREE THYROXINE: CPT

## 2022-10-28 PROCEDURE — 36415 COLL VENOUS BLD VENIPUNCTURE: CPT

## 2022-10-28 PROCEDURE — 84443 ASSAY THYROID STIM HORMONE: CPT

## 2022-10-28 PROCEDURE — 84402 ASSAY OF FREE TESTOSTERONE: CPT

## 2022-11-01 LAB — TESTOSTERONE FREE: NORMAL

## 2023-01-10 DIAGNOSIS — M54.9 CHRONIC NECK AND BACK PAIN: ICD-10-CM

## 2023-01-10 DIAGNOSIS — M54.2 CHRONIC NECK AND BACK PAIN: ICD-10-CM

## 2023-01-10 DIAGNOSIS — G89.29 CHRONIC NECK AND BACK PAIN: ICD-10-CM

## 2023-01-10 DIAGNOSIS — M54.16 LUMBAR RADICULOPATHY: ICD-10-CM

## 2023-01-10 DIAGNOSIS — M79.7 FIBROMYALGIA: ICD-10-CM

## 2023-01-11 RX ORDER — ORPHENADRINE CITRATE 100 MG/1
TABLET, EXTENDED RELEASE ORAL
Qty: 60 TABLET | Refills: 1 | Status: SHIPPED | OUTPATIENT
Start: 2023-01-11

## 2023-01-11 NOTE — TELEPHONE ENCOUNTER
Recent Visits  Date Type Provider Dept   08/18/22 Office Visit Enrigue All, APRN - CNP [facility]   01/13/22 Office Visit Enrigue All, APRN - CNP [facility]   Showing recent visits within past 540 days with a meds authorizing provider and meeting all other requirements  Future Appointments  No visits were found meeting these conditions.   Showing future appointments within next 150 days with a meds authorizing provider and meeting all other requirements      Future Appointments   Date Time Provider Luiz Nieves   1/18/2023  2:00 PM Kevin Aragon MD [facility]

## 2023-04-05 DIAGNOSIS — E03.9 ACQUIRED HYPOTHYROIDISM: ICD-10-CM

## 2023-04-05 RX ORDER — LEVOTHYROXINE SODIUM 0.07 MG/1
TABLET ORAL
Qty: 90 TABLET | Refills: 1 | Status: SHIPPED | OUTPATIENT
Start: 2023-04-05

## 2023-04-17 ENCOUNTER — TELEPHONE (OUTPATIENT)
Dept: FAMILY MEDICINE CLINIC | Age: 68
End: 2023-04-17

## 2023-04-17 NOTE — TELEPHONE ENCOUNTER
See other phone encounter. Needs appt, especially if referral is needed or MRI is needed to be ordered.  I'm not exactly sure what is going on with her leg because I haven't seen her about this, so I would really need to see her to best know what to order etc.

## 2023-04-17 NOTE — TELEPHONE ENCOUNTER
Patient called back in and stated that she does want to do PT again (will need a new referral) but she also wants to know if she can get an MRI done before to determine what the actual issue is going on with her leg. She stated she is not able to walk on it anymore.

## 2023-04-18 ENCOUNTER — OFFICE VISIT (OUTPATIENT)
Dept: FAMILY MEDICINE CLINIC | Age: 68
End: 2023-04-18
Payer: MEDICARE

## 2023-04-18 VITALS
TEMPERATURE: 97.7 F | DIASTOLIC BLOOD PRESSURE: 78 MMHG | RESPIRATION RATE: 18 BRPM | HEART RATE: 70 BPM | SYSTOLIC BLOOD PRESSURE: 122 MMHG | WEIGHT: 143.8 LBS | HEIGHT: 69 IN | BODY MASS INDEX: 21.3 KG/M2 | OXYGEN SATURATION: 98 %

## 2023-04-18 DIAGNOSIS — M25.552 LEFT HIP PAIN: Primary | ICD-10-CM

## 2023-04-18 DIAGNOSIS — J32.9 CHRONIC SINUSITIS, UNSPECIFIED LOCATION: ICD-10-CM

## 2023-04-18 DIAGNOSIS — S73.192A TEAR OF LEFT ACETABULAR LABRUM, INITIAL ENCOUNTER: ICD-10-CM

## 2023-04-18 DIAGNOSIS — R09.89 CHRONIC SINUS COMPLAINTS: ICD-10-CM

## 2023-04-18 DIAGNOSIS — M16.12 PRIMARY OSTEOARTHRITIS OF LEFT HIP: ICD-10-CM

## 2023-04-18 DIAGNOSIS — M70.62 GREATER TROCHANTERIC BURSITIS OF LEFT HIP: ICD-10-CM

## 2023-04-18 PROCEDURE — 1123F ACP DISCUSS/DSCN MKR DOCD: CPT | Performed by: NURSE PRACTITIONER

## 2023-04-18 PROCEDURE — 99214 OFFICE O/P EST MOD 30 MIN: CPT | Performed by: NURSE PRACTITIONER

## 2023-04-18 ASSESSMENT — PATIENT HEALTH QUESTIONNAIRE - PHQ9
SUM OF ALL RESPONSES TO PHQ QUESTIONS 1-9: 0
5. POOR APPETITE OR OVEREATING: 0
10. IF YOU CHECKED OFF ANY PROBLEMS, HOW DIFFICULT HAVE THESE PROBLEMS MADE IT FOR YOU TO DO YOUR WORK, TAKE CARE OF THINGS AT HOME, OR GET ALONG WITH OTHER PEOPLE: 0
7. TROUBLE CONCENTRATING ON THINGS, SUCH AS READING THE NEWSPAPER OR WATCHING TELEVISION: 0
9. THOUGHTS THAT YOU WOULD BE BETTER OFF DEAD, OR OF HURTING YOURSELF: 0
8. MOVING OR SPEAKING SO SLOWLY THAT OTHER PEOPLE COULD HAVE NOTICED. OR THE OPPOSITE, BEING SO FIGETY OR RESTLESS THAT YOU HAVE BEEN MOVING AROUND A LOT MORE THAN USUAL: 0
6. FEELING BAD ABOUT YOURSELF - OR THAT YOU ARE A FAILURE OR HAVE LET YOURSELF OR YOUR FAMILY DOWN: 0
2. FEELING DOWN, DEPRESSED OR HOPELESS: 0
SUM OF ALL RESPONSES TO PHQ QUESTIONS 1-9: 0
1. LITTLE INTEREST OR PLEASURE IN DOING THINGS: 0
SUM OF ALL RESPONSES TO PHQ QUESTIONS 1-9: 0
3. TROUBLE FALLING OR STAYING ASLEEP: 0
4. FEELING TIRED OR HAVING LITTLE ENERGY: 0
SUM OF ALL RESPONSES TO PHQ QUESTIONS 1-9: 0
SUM OF ALL RESPONSES TO PHQ9 QUESTIONS 1 & 2: 0

## 2023-04-18 NOTE — PROGRESS NOTES
with Pulm and GI    DISPOSITION    Return if symptoms worsen or fail to improve. Larry Hankins released without restrictions. PATIENT COUNSELING    Counseling was provided today regarding the following topics: Healthy eating habits, Regular exercise, substance abuse and healthy sleep habits. Patient given educational materials on: See Attached    Barriers to learning and self management: none    Discussed use, benefit, and side effects of prescribed medications. Barriers to medication compliance addressed. All patient questions answered. Pt voiced understanding.        Electronically signed by LAURIE Ahumada CNP on 4/18/2023 at 3:18 PM

## 2023-04-25 ENCOUNTER — HOSPITAL ENCOUNTER (OUTPATIENT)
Dept: CT IMAGING | Age: 68
Discharge: HOME | End: 2023-04-25
Payer: MEDICARE

## 2023-04-25 DIAGNOSIS — J32.9 CHRONIC SINUSITIS, UNSPECIFIED LOCATION: ICD-10-CM

## 2023-04-25 DIAGNOSIS — R09.89 CHRONIC SINUS COMPLAINTS: ICD-10-CM

## 2023-04-25 PROCEDURE — 70486 CT MAXILLOFACIAL W/O DYE: CPT

## 2023-04-26 ENCOUNTER — TELEPHONE (OUTPATIENT)
Dept: FAMILY MEDICINE CLINIC | Age: 68
End: 2023-04-26

## 2023-04-26 DIAGNOSIS — R09.89 CHRONIC SINUS COMPLAINTS: Primary | ICD-10-CM

## 2023-04-26 DIAGNOSIS — R05.3 CHRONIC COUGH: ICD-10-CM

## 2023-04-26 DIAGNOSIS — J34.2 DEVIATED NASAL SEPTUM: ICD-10-CM

## 2023-04-26 NOTE — TELEPHONE ENCOUNTER
----- Message from LAURIE Clemens CNP sent at 4/26/2023  3:03 PM EDT -----  Let Liliana Santa know her CT of the sinuses shows some chronic inflammation of the sinuses and a deviated nasal septum. I would consider seeing ENT for evaluation. Referral placed.

## 2023-06-28 RX ORDER — LEVALBUTEROL TARTRATE 45 MCG
HFA AEROSOL WITH ADAPTER (GRAM) INHALATION
Qty: 15 G | Refills: 2 | Status: SHIPPED | OUTPATIENT
Start: 2023-06-28

## 2023-06-30 ENCOUNTER — HOSPITAL ENCOUNTER (OUTPATIENT)
Dept: MRI IMAGING | Age: 68
Discharge: HOME | End: 2023-06-30
Payer: MEDICARE

## 2023-06-30 DIAGNOSIS — M16.12 PRIMARY OSTEOARTHRITIS OF LEFT HIP: ICD-10-CM

## 2023-06-30 DIAGNOSIS — M25.552 LEFT HIP PAIN: ICD-10-CM

## 2023-06-30 DIAGNOSIS — S73.192A TEAR OF LEFT ACETABULAR LABRUM, INITIAL ENCOUNTER: ICD-10-CM

## 2023-06-30 DIAGNOSIS — M70.62 GREATER TROCHANTERIC BURSITIS OF LEFT HIP: ICD-10-CM

## 2023-06-30 PROCEDURE — 73721 MRI JNT OF LWR EXTRE W/O DYE: CPT

## 2023-07-03 ENCOUNTER — PATIENT MESSAGE (OUTPATIENT)
Dept: FAMILY MEDICINE CLINIC | Age: 68
End: 2023-07-03

## 2023-07-03 ENCOUNTER — TELEPHONE (OUTPATIENT)
Dept: FAMILY MEDICINE CLINIC | Age: 68
End: 2023-07-03

## 2023-07-03 DIAGNOSIS — M51.36 LUMBAR DEGENERATIVE DISC DISEASE: ICD-10-CM

## 2023-07-03 DIAGNOSIS — S73.192A TEAR OF LEFT ACETABULAR LABRUM, INITIAL ENCOUNTER: Primary | ICD-10-CM

## 2023-07-03 DIAGNOSIS — M47.816 SPONDYLOSIS OF LUMBAR SPINE: Primary | ICD-10-CM

## 2023-07-03 NOTE — TELEPHONE ENCOUNTER
Pt informed and understanding     Pt agreeable to see orthopedics   Okay to go to [facility]

## 2023-07-03 NOTE — TELEPHONE ENCOUNTER
----- Message from LAURIE Holbrook CNP sent at 7/3/2023  2:05 PM EDT -----  Let Jessa Jewell know her hip MRI does show a partial tear of the hamstring tendon as well as a labral tear. She also has some mild degenerative arthritis. The labrum is a ring of cartilage that follows the ball and socket joint of the hip. Would she like to see orthopedic about this and get their opinion?

## 2023-07-05 NOTE — TELEPHONE ENCOUNTER
From: Dale Buckner  To: Tonna Homans  Sent: 7/3/2023 6:56 PM EDT  Subject: MRI follow up questions    Hi Dr. Tor Marsh, two quick questions. 1. Is there a specific person you recommend I see at [address] [place] [address]? If anything surgical does need done I'll be looking for minimally invasive options. 2. My lower back has been causing problems for a number of years, but I started having severe pain during the MRI while laying flat on a hard surface for a prolonged time. That was last week and my lower back pain is still so bad I cannot sit whatsoever. Over the weekend it's now started hurting even while standing. Could we also order a MRI of the lower back and pelvis?     Thank you for all you've done to help me and my pain!  -Aiyana Amin

## 2023-07-22 ENCOUNTER — HOSPITAL ENCOUNTER (OUTPATIENT)
Dept: MRI IMAGING | Age: 68
Discharge: HOME | End: 2023-07-22
Payer: MEDICARE

## 2023-07-22 DIAGNOSIS — M47.816 SPONDYLOSIS OF LUMBAR SPINE: ICD-10-CM

## 2023-07-22 DIAGNOSIS — M51.36 LUMBAR DEGENERATIVE DISC DISEASE: ICD-10-CM

## 2023-07-22 PROCEDURE — 72148 MRI LUMBAR SPINE W/O DYE: CPT

## 2023-07-24 ENCOUNTER — TELEPHONE (OUTPATIENT)
Dept: FAMILY MEDICINE CLINIC | Age: 68
End: 2023-07-24

## 2023-07-24 DIAGNOSIS — M47.816 SPONDYLOSIS OF LUMBAR SPINE: Primary | ICD-10-CM

## 2023-07-24 DIAGNOSIS — M51.36 LUMBAR DEGENERATIVE DISC DISEASE: ICD-10-CM

## 2023-07-24 NOTE — TELEPHONE ENCOUNTER
----- Message from LAURIE Good - CNP sent at 7/24/2023  8:31 AM EDT -----  Let Emily Nicolas know her MRI of her lumbar spine shows degenerative changes. She also has a bulging disc at L1-L2, L2-L3, and also a mild bulging disc at L4-L5. I think my recommendations on the best way to manage this is via pain management. They can do other things, besides medications to help with the chronic pain( like nerve blocks, injections, epidurals etc). I would not do anything surgically at this point. She can think about it and let me know how she wishes to proceed.

## 2023-07-24 NOTE — TELEPHONE ENCOUNTER
Patient informed and verbalized understanding.  Patient states she is okay with a referral to PM. She wants the \" best\"

## 2023-09-22 DIAGNOSIS — E03.9 ACQUIRED HYPOTHYROIDISM: ICD-10-CM

## 2023-09-22 RX ORDER — LEVOTHYROXINE SODIUM 0.07 MG/1
TABLET ORAL
Qty: 90 TABLET | Refills: 1 | Status: SHIPPED | OUTPATIENT
Start: 2023-09-22

## 2023-09-22 NOTE — TELEPHONE ENCOUNTER
Recent Visits  Date Type Provider Dept   04/18/23 Office Visit LAURIE Santo CNP [facility]   08/18/22 Office Visit LAURIE Santo CNP [facility]   Showing recent visits within past 540 days with a meds authorizing provider and meeting all other requirements  Future Appointments  No visits were found meeting these conditions.   Showing future appointments within next 150 days with a meds authorizing provider and meeting all other requirements

## 2023-10-03 ENCOUNTER — HOSPITAL ENCOUNTER (OUTPATIENT)
Dept: GENERAL RADIOLOGY | Age: 68
Discharge: HOME | End: 2023-10-03
Payer: MEDICARE

## 2023-10-03 ENCOUNTER — OFFICE VISIT (OUTPATIENT)
Dept: FAMILY MEDICINE CLINIC | Age: 68
End: 2023-10-03
Payer: MEDICARE

## 2023-10-03 ENCOUNTER — HOSPITAL ENCOUNTER (OUTPATIENT)
Age: 68
Discharge: HOME | End: 2023-10-03
Payer: MEDICARE

## 2023-10-03 VITALS
SYSTOLIC BLOOD PRESSURE: 108 MMHG | DIASTOLIC BLOOD PRESSURE: 62 MMHG | BODY MASS INDEX: 27.84 KG/M2 | HEIGHT: 60 IN | RESPIRATION RATE: 12 BRPM | HEART RATE: 82 BPM | WEIGHT: 141.8 LBS | TEMPERATURE: 97.3 F | OXYGEN SATURATION: 96 %

## 2023-10-03 DIAGNOSIS — M65.4 DE QUERVAIN'S TENOSYNOVITIS, RIGHT: ICD-10-CM

## 2023-10-03 DIAGNOSIS — M79.644 THUMB PAIN, RIGHT: ICD-10-CM

## 2023-10-03 DIAGNOSIS — M79.644 THUMB PAIN, RIGHT: Primary | ICD-10-CM

## 2023-10-03 DIAGNOSIS — M47.816 SPONDYLOSIS OF LUMBAR SPINE: ICD-10-CM

## 2023-10-03 DIAGNOSIS — M16.12 PRIMARY OSTEOARTHRITIS OF LEFT HIP: ICD-10-CM

## 2023-10-03 PROCEDURE — 99214 OFFICE O/P EST MOD 30 MIN: CPT | Performed by: NURSE PRACTITIONER

## 2023-10-03 PROCEDURE — 1123F ACP DISCUSS/DSCN MKR DOCD: CPT | Performed by: NURSE PRACTITIONER

## 2023-10-03 PROCEDURE — 73130 X-RAY EXAM OF HAND: CPT

## 2023-10-03 RX ORDER — KETOROLAC TROMETHAMINE 30 MG/ML
30 INJECTION, SOLUTION INTRAMUSCULAR; INTRAVENOUS ONCE
Status: COMPLETED | OUTPATIENT
Start: 2023-10-03 | End: 2023-10-03

## 2023-10-03 RX ORDER — METHYLPREDNISOLONE ACETATE 40 MG/ML
40 INJECTION, SUSPENSION INTRA-ARTICULAR; INTRALESIONAL; INTRAMUSCULAR; SOFT TISSUE ONCE
Status: COMPLETED | OUTPATIENT
Start: 2023-10-03 | End: 2023-10-03

## 2023-10-03 RX ORDER — PREDNISONE 20 MG/1
40 TABLET ORAL DAILY
Qty: 10 TABLET | Refills: 0 | Status: SHIPPED | OUTPATIENT
Start: 2023-10-03 | End: 2023-10-08

## 2023-10-03 RX ADMIN — KETOROLAC TROMETHAMINE 30 MG: 30 INJECTION, SOLUTION INTRAMUSCULAR; INTRAVENOUS at 12:58

## 2023-10-03 RX ADMIN — METHYLPREDNISOLONE ACETATE 40 MG: 40 INJECTION, SUSPENSION INTRA-ARTICULAR; INTRALESIONAL; INTRAMUSCULAR; SOFT TISSUE at 13:00

## 2023-10-03 NOTE — PROGRESS NOTES
Medication(s) given during visit:    Administrations This Visit       ketorolac (TORADOL) injection 30 mg       Admin Date  10/03/2023  12:58 Action  Given Dose  30 mg Route  IntraMUSCular Site  Dorsogluteal Left Administered By  Darin Apley, LPN    Ordering Provider: LAURIE Agudelo CNP    NDC: 5881-8442-10    Lot#: [id number]    : HOSPIRA    Patient Supplied?: No              methylPREDNISolone acetate (DEPO-MEDROL) injection 40 mg       Admin Date  10/03/2023  13:00 Action  Given Dose  40 mg Route  IntraMUSCular Site  Dorsogluteal Left Administered By  Darin Apley, LPN    Ordering Provider: LAURIE Agudelo CNP    NDC: [address]-1    Lot#: [id number]    : SCM-GL    Patient Supplied?: No                    Patient instructed to remain in clinic for 20 minutes after injection and was advised to report any adverse reaction to me immediately.
habits, Regular exercise, substance abuse and healthy sleep habits. Patient given educational materials on: See Attached    Barriers to learning and self management: none    Discussed use, benefit, and side effects of prescribed medications. Barriers to medication compliance addressed. All patient questions answered. Pt voiced understanding.        Electronically signed by LAURIE Lin CNP on 10/3/2023 at 12:58 PM

## 2023-10-05 ENCOUNTER — TELEPHONE (OUTPATIENT)
Dept: FAMILY MEDICINE CLINIC | Age: 68
End: 2023-10-05

## 2023-10-05 NOTE — TELEPHONE ENCOUNTER
Patient informed and verbalized understanding. Wants to know how her wrist looks, and she states that you talked about another possible DX, and wanted to know if there is any addition for that DX.    ( De Quervain's tenosynovitis, right)

## 2023-10-05 NOTE — TELEPHONE ENCOUNTER
----- Message from LAURIE Ramirez CNP sent at 10/5/2023  9:01 AM EDT -----  Let Miles Campbell know her hand xray shows osteoporosis and arthritis type changes, particularly in the thumb (worse than the rest of the fingers).

## 2023-11-01 ENCOUNTER — HOSPITAL ENCOUNTER (OUTPATIENT)
Age: 68
Discharge: HOME | End: 2023-11-01
Payer: MEDICARE

## 2023-11-01 DIAGNOSIS — E78.2 MIXED HYPERLIPIDEMIA: ICD-10-CM

## 2023-11-01 DIAGNOSIS — E03.9 ACQUIRED HYPOTHYROIDISM: ICD-10-CM

## 2023-11-01 LAB
CHOLEST SERPL-MCNC: 261 MG/DL (ref 100–199)
HDLC SERPL-MCNC: 58 MG/DL
LDLC SERPL CALC-MCNC: 176 MG/DL
T4 FREE SERPL-MCNC: 1.16 NG/DL (ref 0.93–1.76)
TRIGL SERPL-MCNC: 134 MG/DL (ref 0–199)
TSH SERPL DL<=0.005 MIU/L-ACNC: 7.14 UIU/ML (ref 0.4–4.2)

## 2023-11-01 PROCEDURE — 84443 ASSAY THYROID STIM HORMONE: CPT

## 2023-11-01 PROCEDURE — 84270 ASSAY OF SEX HORMONE GLOBUL: CPT

## 2023-11-01 PROCEDURE — 36415 COLL VENOUS BLD VENIPUNCTURE: CPT

## 2023-11-01 PROCEDURE — 84402 ASSAY OF FREE TESTOSTERONE: CPT

## 2023-11-01 PROCEDURE — 80061 LIPID PANEL: CPT

## 2023-11-01 PROCEDURE — 84403 ASSAY OF TOTAL TESTOSTERONE: CPT

## 2023-11-01 PROCEDURE — 84439 ASSAY OF FREE THYROXINE: CPT

## 2023-11-03 LAB — TESTOSTERONE FREE: NORMAL

## 2023-11-15 ENCOUNTER — OFFICE VISIT (OUTPATIENT)
Dept: ENT CLINIC | Age: 68
End: 2023-11-15
Payer: MEDICARE

## 2023-11-15 ENCOUNTER — TELEPHONE (OUTPATIENT)
Dept: ENT CLINIC | Age: 68
End: 2023-11-15

## 2023-11-15 VITALS
WEIGHT: 142.1 LBS | RESPIRATION RATE: 12 BRPM | SYSTOLIC BLOOD PRESSURE: 116 MMHG | HEART RATE: 64 BPM | TEMPERATURE: 97.8 F | BODY MASS INDEX: 27.9 KG/M2 | HEIGHT: 60 IN | DIASTOLIC BLOOD PRESSURE: 70 MMHG

## 2023-11-15 DIAGNOSIS — K22.4 CRICOPHARYNGEUS MUSCLE DYSFUNCTION: ICD-10-CM

## 2023-11-15 DIAGNOSIS — R59.0 CERVICAL LYMPHADENOPATHY: ICD-10-CM

## 2023-11-15 DIAGNOSIS — R13.14 PHARYNGOESOPHAGEAL DYSPHAGIA: ICD-10-CM

## 2023-11-15 DIAGNOSIS — K21.00 GASTROESOPHAGEAL REFLUX DISEASE WITH ESOPHAGITIS WITHOUT HEMORRHAGE: Primary | ICD-10-CM

## 2023-11-15 DIAGNOSIS — M54.2 CERVICALGIA: ICD-10-CM

## 2023-11-15 DIAGNOSIS — H61.22 IMPACTED CERUMEN OF LEFT EAR: ICD-10-CM

## 2023-11-15 DIAGNOSIS — R59.0 CERVICAL LYMPHADENOPATHY: Primary | ICD-10-CM

## 2023-11-15 PROCEDURE — 1123F ACP DISCUSS/DSCN MKR DOCD: CPT | Performed by: OTOLARYNGOLOGY

## 2023-11-15 PROCEDURE — 99204 OFFICE O/P NEW MOD 45 MIN: CPT | Performed by: OTOLARYNGOLOGY

## 2023-11-15 RX ORDER — FAMOTIDINE 40 MG/1
40 TABLET, FILM COATED ORAL EVERY EVENING
Qty: 90 TABLET | Refills: 1 | Status: SHIPPED | OUTPATIENT
Start: 2023-11-15

## 2023-11-15 RX ORDER — FAMOTIDINE 40 MG/1
40 TABLET, FILM COATED ORAL EVERY EVENING
Qty: 30 TABLET | Refills: 3 | Status: SHIPPED | OUTPATIENT
Start: 2023-11-15 | End: 2023-11-15

## 2023-11-15 NOTE — PROGRESS NOTES
[facility] PHYSICIANS [place] [facility]TY  [facility] EAR, NOSE AND THROAT  [address] [address]  Dept: [phone]  Dept Fax: [phone]  Loc: Rosetta Pisano is a 76 y.o. female who was referred by LAURIE Vazquez -* for:  Chief Complaint   Patient presents with    Sinusitis    Nasal deviated septum     New patient here for evaluation of her chronic sinusitis and deviated nasal septum. Referred by Felipa Solano CNP.        HPI:     Citlaly Valentino is a 76 y.o. female ostensibly referred for evaluation of chronic sinusitis and a deviated nasal septum about which her symptoms are relatively minor. Her more important concerns are that of a persistent pain in her left upper parapharyngeal neck aggravated by difficulties swallowing, history of chronic GERD with gastric ulcers possibly related to NSAID ingestion, and pain and tenderness about her submandibular and sublingual gland areas of the oral cavity. The patient has a brother who was recently diagnosed and treated for esophageal cancer at [facility] and is reportedly cancer free. Nonetheless that was a concerning event for her and her family since she has a reflux history and has a sister who has been diagnosed with Torres's esophagus. Of note is the fact that she is back to taking once a day OTC Naprosyn that she takes at bedtime usually on an empty stomach. Sometimes she takes a bedtime snack to cushion her stomach. So far she does not have the pain from the stomach that she had several years ago when in 2016 she was found to have multiple gastric ulcers and moderate lower esophagitis. Her nighttime NSAID dose is so that she has some pain relief from diffuse mild arthritis symptoms that tend to keep her from sleeping deeply. The patient's dysphagia is worst as regards swallowing food that promote phlegm formation.   That is when she can actually have an interval of pain but prevents her from

## 2023-11-15 NOTE — TELEPHONE ENCOUNTER
Can you place an order for US soft tissue head & neck? Dr Keisha Allen ordered the wrong on.     Thanks

## 2023-11-16 ENCOUNTER — TELEPHONE (OUTPATIENT)
Dept: ENT CLINIC | Age: 68
End: 2023-11-16

## 2023-11-16 NOTE — TELEPHONE ENCOUNTER
Received a call from Delilah at Dr Maria Del Rosario [facility]ega office. She wanted to verify we received patient's EGD report. Report scanned into media. Dr Amy Walls had requested to see it.

## 2023-11-18 NOTE — TELEPHONE ENCOUNTER
Please contact the patient and let her know that I saw her report. I have already recommended she get back with this doctor for a repeat EGD. Of note is the fact that 1 cannot biopsy a papillomatous lesion and determined that it is free of risk of malignant transformation without doing molecular studies on the tissue.   All the more reason for her to have a follow-up exam.  Thank you  PFC

## 2023-12-29 ENCOUNTER — HOSPITAL ENCOUNTER (OUTPATIENT)
Dept: ULTRASOUND IMAGING | Age: 68
Discharge: HOME | End: 2023-12-29
Payer: MEDICARE

## 2023-12-29 DIAGNOSIS — R59.0 CERVICAL LYMPHADENOPATHY: ICD-10-CM

## 2023-12-29 PROCEDURE — 76536 US EXAM OF HEAD AND NECK: CPT

## 2024-01-03 ENCOUNTER — NURSE ONLY (OUTPATIENT)
Dept: LAB | Age: 69
End: 2024-01-03

## 2024-01-03 DIAGNOSIS — E03.9 ACQUIRED HYPOTHYROIDISM: ICD-10-CM

## 2024-01-03 LAB
T4 FREE SERPL-MCNC: 1.22 NG/DL (ref 0.93–1.76)
TSH SERPL DL<=0.005 MIU/L-ACNC: 2.28 UIU/ML (ref 0.4–4.2)

## 2024-01-05 ENCOUNTER — HOSPITAL ENCOUNTER (OUTPATIENT)
Dept: GENERAL RADIOLOGY | Age: 69
Discharge: HOME | End: 2024-01-05
Attending: OTOLARYNGOLOGY
Payer: MEDICARE

## 2024-01-05 DIAGNOSIS — R13.14 PHARYNGOESOPHAGEAL DYSPHAGIA: ICD-10-CM

## 2024-01-05 DIAGNOSIS — K21.00 GASTROESOPHAGEAL REFLUX DISEASE WITH ESOPHAGITIS WITHOUT HEMORRHAGE: ICD-10-CM

## 2024-01-05 PROCEDURE — 6370000000 HC RX 637 (ALT 250 FOR IP): Performed by: OTOLARYNGOLOGY

## 2024-01-05 PROCEDURE — 2500000003 HC RX 250 WO HCPCS: Performed by: OTOLARYNGOLOGY

## 2024-01-05 PROCEDURE — 74220 X-RAY XM ESOPHAGUS 1CNTRST: CPT

## 2024-01-05 RX ADMIN — BARIUM SULFATE 1 TABLET: 700 TABLET ORAL at 11:35

## 2024-01-05 RX ADMIN — BARIUM SULFATE 50 ML: 0.6 SUSPENSION ORAL at 11:34

## 2024-01-05 RX ADMIN — BARIUM SULFATE 140 ML: 980 POWDER, FOR SUSPENSION ORAL at 11:34

## 2024-01-05 RX ADMIN — ANTACID/ANTIFLATULENT 1 EACH: 380; 550; 10; 10 GRANULE, EFFERVESCENT ORAL at 11:34

## 2024-01-09 ENCOUNTER — TELEPHONE (OUTPATIENT)
Dept: ENT CLINIC | Age: 69
End: 2024-01-09

## 2024-01-09 NOTE — TELEPHONE ENCOUNTER
I was on the phone with the patient regarding her US results and patient then asked about her results for the FL ESOPHAGRAM. Informed patient Dr العراقي did not mention anything about those results but I would check with him tomorrow and call her back.     Encounter printed to discuss with Dr العراقي.

## 2024-01-31 NOTE — TELEPHONE ENCOUNTER
Thalia DAVEY spoke to Dr العراقي regarding the results. He said her results were basically normal but there were some abnormalities detected at intervals. He said she does not have a hiatal hernia. Dr العراقي says just because problems didn't show up on the test this time doesn't mean she doesn't have the problems.     Spoke to patient. Explained to her what Dr العراقي said about her results. Patient verbalized understanding and thanked me.      Patient also wanted to inform Dr العراقي that her GI doctor, Dr Hernandez, is doing an Endoscopy on 03/05/24. Informed patient I would document that in her chart.  Patient verbalized understanding and thanked me.

## 2024-02-26 ENCOUNTER — TELEPHONE (OUTPATIENT)
Dept: ENT CLINIC | Age: 69
End: 2024-02-26

## 2024-02-26 NOTE — TELEPHONE ENCOUNTER
Patient was supposed to see Dr العراقي but he was out of town and patient was calling to cancelling since the day before her appointment Dr Hernandez was doing a procedure on her.    I scheduled her next available and added to waiting list.  Advised that we will be on the lookout for her results from Dr Hernandez and have Dr العراقي review and give scheduling instructions if she needs sooner appointment.

## 2024-02-27 RX ORDER — SODIUM CHLORIDE, SODIUM LACTATE, POTASSIUM CHLORIDE, CALCIUM CHLORIDE 600; 310; 30; 20 MG/100ML; MG/100ML; MG/100ML; MG/100ML
INJECTION, SOLUTION INTRAVENOUS CONTINUOUS
Status: DISCONTINUED | OUTPATIENT
Start: 2024-02-27 | End: 2024-03-05 | Stop reason: HOSPADM

## 2024-02-27 RX ORDER — SODIUM CHLORIDE 0.9 % (FLUSH) 0.9 %
5-40 SYRINGE (ML) INJECTION PRN
Status: DISCONTINUED | OUTPATIENT
Start: 2024-02-27 | End: 2024-03-05 | Stop reason: HOSPADM

## 2024-02-27 RX ORDER — SODIUM CHLORIDE 9 MG/ML
25 INJECTION, SOLUTION INTRAVENOUS PRN
Status: DISCONTINUED | OUTPATIENT
Start: 2024-02-27 | End: 2024-03-05 | Stop reason: HOSPADM

## 2024-02-27 RX ORDER — SODIUM CHLORIDE 0.9 % (FLUSH) 0.9 %
5-40 SYRINGE (ML) INJECTION EVERY 12 HOURS SCHEDULED
Status: DISCONTINUED | OUTPATIENT
Start: 2024-02-27 | End: 2024-03-05 | Stop reason: HOSPADM

## 2024-02-29 NOTE — H&P
[facility]  Sedation/Analgesia History & Physical    Patient: Elysia Buckner : 1955  Med Rec#: [id number] Acc#: [id number]   Provider Performing Procedure: Agnieszka Hernandez MD  Primary Care Physician: Leonardo Griggs, LAURIE - JAYESH    PRE-PROCEDURE   Brief History/Pre-Procedure Diagnosis:The patient is a 68 y.o.,  female with significant past medical history of dysphagia and GERD. Here for screening.           MEDICAL HISTORY  []CAD/Valve  []Liver Disease  []Lung Disease []Diabetes  []Hypertension []Renal Disease  [x]Additional information:       has a past medical history of Arthritis, Biallelic mutation of LPA gene, Bradycardia, Cancer (HCC), Chronic fatigue syndrome, COPD (chronic obstructive pulmonary disease) (HCC), Degenerative cervical disc, Depression, Diverticulitis of colon, Fatigue, Fibromyalgia, Headache, Hiatal hernia, Hyperlipidemia, Postmenopausal osteoporosis, Prolonged emergence from general anesthesia, Thoracic degenerative disc disease, Thoracic disc herniation, and Thyroid disease.    SURGICAL HISTORY   has a past surgical history that includes hernia repair; Cervix surgery; Upper gastrointestinal endoscopy (3/10/16); Appendectomy (2017); Colonoscopy; Upper gastrointestinal endoscopy (Left, 3/27/2019); and Colonoscopy (3/27/2019).  Additional information:       ALLERGIES   Allergies as of 2024 - Fully Reviewed 11/15/2023   Allergen Reaction Noted    Ciprofloxacin  10/13/2015    Erythromycin Itching 03/10/2016    Lipitor [atorvastatin] Other (See Comments) 2019    Percocet [oxycodone-acetaminophen] Itching 2021    Robitussin dm [dextromethorphan-guaifenesin]  2016    Sulfa antibiotics Hives 10/13/2015    Augmentin [amoxicillin-pot clavulanate] Diarrhea and Nausea And Vomiting 2016    Codeine Nausea And Vomiting 10/13/2015    Pcn [penicillins] Diarrhea and Nausea And Vomiting 10/13/2015     Additional  with mild systemic disease    Airway Assessment: normal    Monitoring and Safety: The patient will be placed on a cardiac monitor and vital signs, pulse oximetry and level of consciousness will be continuously evaluated throughout the procedure. The patient will be closely monitored until recovery from the medications is complete and the patient has returned to baseline status.  Respiratory therapy will be on standby during the procedure.    [x]Pre-procedure diagnostic studies complete and results available.   Comment:    [x]Previous sedation/anesthesia experiences assessed.   Comment:    [x]The patient is an appropriate candidate to undergo the planned procedure sedation and anesthesia. (Refer to nursing sedation/analgesia documentation record)  [x]Formulation and discussion of sedation/procedure plan, risks, and expectations with patient and/or responsible adult completed.  [x]Patient examined immediately prior to the procedure. (Refer to nursing sedation/analgesia documentation record)    Agnieszka Hernandez MD   Electronically signed 3/5/2024 at 12:13 PM

## 2024-02-29 NOTE — PROCEDURES
[facility] Endoscopy     EGD/COLONOSCOPY Report    Patient: Elysia Buckner  : 1955  Acct#: [id number]     BRIEF HISTORY AND INDICATIONS:    The patient is a 68 y.o.,  female with significant past medical history of dysphagia and GERD.  Also here for Colonoscopy for screening. Has FHx of CRC . Personal hx of  colon polyps.     PREMEDICATION:  TIVA anesthesia was used, see Anesthesia note for details.        The risks and benefits of upper endoscopy with biopsy and dilation were  described to the patient, including but not limited to bleeding, infection, poking a hole someplace requiring surgery to fix it, having reaction to medication, and death. The patient understood these risks and provided informed consent.  The patient was placed in the left lateral decubitus position. The patient was continuously monitored to ensure adequate sedation and patient safety.     A forward-viewing Olympus endoscope was lubricated and inserted through the mouth into the oropharynx. Under direct visualization, the upper esophagus was intubated.  The scope was advanced to the esophagus and stomach to second portion of duodenum.  Scope was slowly withdrawn with good views of mucosal surfaces.  The scope was retroflexed in the fundus. Findings and maneuvers are listed in impression below. The patient tolerated the procedure well.  The scope was removed.     See impression below for detail of findings of the EGD.         DESCRIPTION OF PROCEDURE: The risks of bleeding, trauma, infection, perforation, and medication-related cardiac and respiratory complications were discussed and written informed consent was obtained. After obtaining informed consent, a rectal exam was done.    The patient was continuously monitored to ensure adequate sedation and patient safety.  Subsequently, the colonoscope was placed in the rectum and advanced to the ileocecal valve and cecum.        The scope was  removed slowly while  carefully examining color, mucosa, texture and anatomy of the colon were carefully examined with the scope.  Retroflexion was performed in the rectum to evaluate for hemorrhoids and anorectal pathology. Findings and maneuvers are listed in impression below.  The scope was removed.  The patient was moved to the recovery area.      See impression below for details of findings.       Difficulty of procedure: Minimal    Quality of colon prep: Adequate    Withdrawal time: > 6 minutes    EBL : < 10 mL    IMPRESSION:   EGD:  Esophageal dilation using a 51 Indian dilator .   Schatzki's ring  Gastritis biopsied  2 cm sliding hiatal hernia.   Gastric side of the GE junction had thickening , this was biopsied.   Otherwise, normal to the 2nd portion of the duodenum.     [place]:  No polyps.   Extensive diverticulosis with poo balls within some of the diverticula.   Sigmoid colon tattoo from old polypectomy.   Mild Internal Hemorrhoids.     RECOMMENDATIONS:    Follow post  esophageal dilation recommendations.   Resume meds  Repeat colonoscopy in 5 years.   Follow up with Dr. Hernandez as scheduled, if no appt. Then follow up in 3 months.        Specimens: were obtained    EBL : < 10 mL    (The following sections must be completed)  Post-Sedation Vital Signs: Vital signs were reviewed and were stable after the procedure (see flow sheet for vitals)            Post-Sedation Exam: Lungs: clear to auscultation bilaterally and Cardiovascular: regular rate and rhythm           Complications: none        Agnieszka Hernandez MD, FACG

## 2024-03-05 ENCOUNTER — ANESTHESIA EVENT (OUTPATIENT)
Dept: ENDOSCOPY | Age: 69
End: 2024-03-05
Payer: MEDICARE

## 2024-03-05 ENCOUNTER — HOSPITAL ENCOUNTER (OUTPATIENT)
Age: 69
Setting detail: OUTPATIENT SURGERY
Discharge: HOME | End: 2024-03-05
Attending: INTERNAL MEDICINE | Admitting: INTERNAL MEDICINE
Payer: MEDICARE

## 2024-03-05 ENCOUNTER — ANESTHESIA (OUTPATIENT)
Dept: ENDOSCOPY | Age: 69
End: 2024-03-05
Payer: MEDICARE

## 2024-03-05 VITALS
TEMPERATURE: 97.3 F | RESPIRATION RATE: 16 BRPM | SYSTOLIC BLOOD PRESSURE: 107 MMHG | BODY MASS INDEX: 20.5 KG/M2 | HEIGHT: 69 IN | HEART RATE: 76 BPM | DIASTOLIC BLOOD PRESSURE: 61 MMHG | WEIGHT: 138.4 LBS | OXYGEN SATURATION: 98 %

## 2024-03-05 PROCEDURE — 3609012400 HC EGD TRANSORAL BIOPSY SINGLE/MULTIPLE: Performed by: INTERNAL MEDICINE

## 2024-03-05 PROCEDURE — 3700000000 HC ANESTHESIA ATTENDED CARE: Performed by: INTERNAL MEDICINE

## 2024-03-05 PROCEDURE — 7100000010 HC PHASE II RECOVERY - FIRST 15 MIN: Performed by: INTERNAL MEDICINE

## 2024-03-05 PROCEDURE — 3609017700 HC EGD DILATION GASTRIC/DUODENAL STRICTURE: Performed by: INTERNAL MEDICINE

## 2024-03-05 PROCEDURE — 3700000001 HC ADD 15 MINUTES (ANESTHESIA): Performed by: INTERNAL MEDICINE

## 2024-03-05 PROCEDURE — 2580000003 HC RX 258: Performed by: INTERNAL MEDICINE

## 2024-03-05 PROCEDURE — 2709999900 HC NON-CHARGEABLE SUPPLY: Performed by: INTERNAL MEDICINE

## 2024-03-05 PROCEDURE — 2500000003 HC RX 250 WO HCPCS: Performed by: REGISTERED NURSE

## 2024-03-05 PROCEDURE — 88305 TISSUE EXAM BY PATHOLOGIST: CPT

## 2024-03-05 PROCEDURE — 3609027000 HC COLONOSCOPY: Performed by: INTERNAL MEDICINE

## 2024-03-05 PROCEDURE — 6360000002 HC RX W HCPCS: Performed by: REGISTERED NURSE

## 2024-03-05 PROCEDURE — 88342 IMHCHEM/IMCYTCHM 1ST ANTB: CPT

## 2024-03-05 RX ORDER — DIPHENHYDRAMINE HCL 25 MG
25 CAPSULE ORAL EVERY 6 HOURS PRN
COMMUNITY

## 2024-03-05 RX ORDER — PROPOFOL 10 MG/ML
INJECTION, EMULSION INTRAVENOUS PRN
Status: DISCONTINUED | OUTPATIENT
Start: 2024-03-05 | End: 2024-03-05 | Stop reason: SDUPTHER

## 2024-03-05 RX ORDER — LANOLIN ALCOHOL/MO/W.PET/CERES
3 CREAM (GRAM) TOPICAL DAILY
COMMUNITY

## 2024-03-05 RX ORDER — LIDOCAINE HYDROCHLORIDE 20 MG/ML
INJECTION, SOLUTION INFILTRATION; PERINEURAL PRN
Status: DISCONTINUED | OUTPATIENT
Start: 2024-03-05 | End: 2024-03-05 | Stop reason: SDUPTHER

## 2024-03-05 RX ORDER — LEVALBUTEROL INHALATION SOLUTION 0.31 MG/3ML
1 SOLUTION RESPIRATORY (INHALATION) EVERY 4 HOURS PRN
COMMUNITY

## 2024-03-05 RX ADMIN — PROPOFOL 100 MG: 10 INJECTION, EMULSION INTRAVENOUS at 12:13

## 2024-03-05 RX ADMIN — PROPOFOL 20 MG: 10 INJECTION, EMULSION INTRAVENOUS at 12:16

## 2024-03-05 RX ADMIN — PROPOFOL 20 MG: 10 INJECTION, EMULSION INTRAVENOUS at 12:31

## 2024-03-05 RX ADMIN — PROPOFOL 20 MG: 10 INJECTION, EMULSION INTRAVENOUS at 12:37

## 2024-03-05 RX ADMIN — PROPOFOL 20 MG: 10 INJECTION, EMULSION INTRAVENOUS at 12:28

## 2024-03-05 RX ADMIN — SODIUM CHLORIDE, POTASSIUM CHLORIDE, SODIUM LACTATE AND CALCIUM CHLORIDE: 600; 310; 30; 20 INJECTION, SOLUTION INTRAVENOUS at 11:34

## 2024-03-05 RX ADMIN — PROPOFOL 20 MG: 10 INJECTION, EMULSION INTRAVENOUS at 12:19

## 2024-03-05 RX ADMIN — PROPOFOL 20 MG: 10 INJECTION, EMULSION INTRAVENOUS at 12:22

## 2024-03-05 RX ADMIN — LIDOCAINE HYDROCHLORIDE 60 MG: 20 INJECTION, SOLUTION INFILTRATION; PERINEURAL at 12:13

## 2024-03-05 RX ADMIN — PROPOFOL 20 MG: 10 INJECTION, EMULSION INTRAVENOUS at 12:25

## 2024-03-05 RX ADMIN — PROPOFOL 20 MG: 10 INJECTION, EMULSION INTRAVENOUS at 12:34

## 2024-03-05 ASSESSMENT — PAIN - FUNCTIONAL ASSESSMENT
PAIN_FUNCTIONAL_ASSESSMENT: 0-10
PAIN_FUNCTIONAL_ASSESSMENT: 0-10
PAIN_FUNCTIONAL_ASSESSMENT: NONE - DENIES PAIN
PAIN_FUNCTIONAL_ASSESSMENT: 0-10
PAIN_FUNCTIONAL_ASSESSMENT: 0-10

## 2024-03-05 ASSESSMENT — ENCOUNTER SYMPTOMS: SHORTNESS OF BREATH: 1

## 2024-03-05 NOTE — DISCHARGE INSTRUCTIONS
IMPRESSION:   EGD:  Esophageal dilation using a 51 Irish dilator .   Schatzki's ring  Gastritis biopsied  2 cm sliding hiatal hernia.   Gastric side of the GE junction had thickening , this was biopsied.   Otherwise, normal to the 2nd portion of the duodenum.     [place]:  No polyps.   Extensive diverticulosis with poo balls within some of the diverticula.   Sigmoid colon tattoo from old polypectomy.   Mild Internal Hemorrhoids.     RECOMMENDATIONS:    Follow post  esophageal dilation recommendations.   Resume meds  Repeat colonoscopy in 5 years.   Follow up with Dr. Hernandez as scheduled, if no appt. Then follow up in 3 months.

## 2024-03-05 NOTE — PROGRESS NOTES
EGD complete, photos taken, dilation complete with 51 FR american dilator. Guidewire removed. Spring intact. 2 specimens taken, pt tolerated procedure well    Scope Number 585 used.         Scope # CF .  Colonoscopy completed, tolerated well. Photos taken.

## 2024-03-05 NOTE — PROGRESS NOTES
Elysia admitted to the endoscopy unit for an EGD and colonoscopy with Dr. Hernandez. Procedural consent signed.

## 2024-03-05 NOTE — ANESTHESIA PRE PROCEDURE
Department of Anesthesiology  Preprocedure Note       Name:  Elsyia Buckner   Age:  68 y.o.  :  1955                                          MRN:  [id number]         Date:  3/5/2024      Surgeon: Surgeon(s):  Agnieszka Hernandez MD    Procedure: Procedure(s):  COLONOSCOPY  EGD DILATATION    Medications prior to admission:   Prior to Admission medications    Medication Sig Start Date End Date Taking? Authorizing Provider   melatonin 3 MG TABS tablet Take 1 tablet by mouth daily   Yes Alcira Razo MD   diphenhydrAMINE (BENADRYL) 25 MG capsule Take 1 capsule by mouth every 6 hours as needed for Itching   Yes Alcira Razo MD   levalbuterol (XOPENEX) 0.31 MG/3ML nebulization Take 3 mLs by nebulization every 4 hours as needed for Wheezing   Yes Alcira Razo MD   famotidine (PEPCID) 40 MG tablet TAKE 1 TABLET BY MOUTH EVERY EVENING 11/15/23   Nickolas Klein PA   levothyroxine (SYNTHROID) 75 MCG tablet Take 1 tablet by mouth daily 23   Aroldo Liao MD   XOPENEX HFA 45 MCG/ACT inhaler INHALE 2 PUFFS INTO THE LUNGS EVERY 6 HOURS AS NEEDED FOR WHEEZING  Patient not taking: Reported on 11/15/2023 6/28/23   Leonardo Griggs APRN - CNP   ivermectin 3 MG tablet Take 6 tablets by mouth once weekly    Alcira Razo MD   aspirin 81 MG tablet Take 1 tablet by mouth daily  Patient not taking: Reported on 11/15/2023    Alcira Razo MD   lidocaine (LIDODERM) 5 % Place 1 patch onto the skin daily as needed for Pain 12 hours on, 12 hours off.  Patient not taking: Reported on 11/15/2023 1/8/19   Leonardo Griggs APRN - CNP   diclofenac sodium (VOLTAREN) 1 % GEL Apply 2 g topically 4 times daily  Patient not taking: Reported on 11/15/2023 1/8/19 8/18/22  Leonardo Griggs APRN - CNP   NONFORMULARY Take 2 capsules by mouth daily OMEGA Q plus Resiverol from Alcira Payton MD   acetaminophen (TYLENOL) 500 MG tablet Take 1 tablet by mouth as needed for  Patient/Caregiver provided printed discharge information.

## 2024-03-07 NOTE — ANESTHESIA POSTPROCEDURE EVALUATION
Department of Anesthesiology  Postprocedure Note    Patient: Elysia Buckner  MRN: [id number]  YOB: 1955  Date of evaluation: 3/6/2024    Procedure Summary       Date: 03/05/24 Room / Location: [place] [address] / [facility]    Anesthesia Start: 1209 Anesthesia Stop: 1247    Procedures:       COLONOSCOPY      EGD DILATATION (Esophagus)      ESOPHAGOGASTRODUODENOSCOPY BIOPSY (Left: Esophagus) Diagnosis:       Gastroesophageal reflux disease, unspecified whether esophagitis present      Dysphagia, unspecified type      Screen for colon cancer      (Gastroesophageal reflux disease, unspecified whether esophagitis present [K21.9])      (Dysphagia, unspecified type [R13.10])      (Screen for colon cancer [Z12.11])    Surgeons: Agnieszka Hernandez MD Responsible Provider: Rc Lopez DO    Anesthesia Type: MAC ASA Status: 3            Anesthesia Type: No value filed.    Carly Phase I: Carly Score: 10    Carly Phase II: Carly Score: 9    Anesthesia Post Evaluation    No notable events documented.

## 2024-03-26 ENCOUNTER — OFFICE VISIT (OUTPATIENT)
Dept: ENT CLINIC | Age: 69
End: 2024-03-26
Payer: MEDICARE

## 2024-03-26 VITALS
SYSTOLIC BLOOD PRESSURE: 116 MMHG | BODY MASS INDEX: 20.99 KG/M2 | RESPIRATION RATE: 20 BRPM | HEART RATE: 69 BPM | OXYGEN SATURATION: 97 % | DIASTOLIC BLOOD PRESSURE: 64 MMHG | WEIGHT: 138.5 LBS | TEMPERATURE: 97.5 F | HEIGHT: 68 IN

## 2024-03-26 DIAGNOSIS — R13.14 PHARYNGOESOPHAGEAL DYSPHAGIA: ICD-10-CM

## 2024-03-26 DIAGNOSIS — M54.2 CERVICALGIA: ICD-10-CM

## 2024-03-26 DIAGNOSIS — E03.9 ACQUIRED HYPOTHYROIDISM: ICD-10-CM

## 2024-03-26 DIAGNOSIS — K22.4 CRICOPHARYNGEUS MUSCLE DYSFUNCTION: ICD-10-CM

## 2024-03-26 DIAGNOSIS — R59.0 CERVICAL LYMPHADENOPATHY: Primary | ICD-10-CM

## 2024-03-26 DIAGNOSIS — K21.00 GASTROESOPHAGEAL REFLUX DISEASE WITH ESOPHAGITIS WITHOUT HEMORRHAGE: ICD-10-CM

## 2024-03-26 PROCEDURE — G8420 CALC BMI NORM PARAMETERS: HCPCS | Performed by: OTOLARYNGOLOGY

## 2024-03-26 PROCEDURE — 1036F TOBACCO NON-USER: CPT | Performed by: OTOLARYNGOLOGY

## 2024-03-26 PROCEDURE — G8484 FLU IMMUNIZE NO ADMIN: HCPCS | Performed by: OTOLARYNGOLOGY

## 2024-03-26 PROCEDURE — 1090F PRES/ABSN URINE INCON ASSESS: CPT | Performed by: OTOLARYNGOLOGY

## 2024-03-26 PROCEDURE — 1123F ACP DISCUSS/DSCN MKR DOCD: CPT | Performed by: OTOLARYNGOLOGY

## 2024-03-26 PROCEDURE — G8399 PT W/DXA RESULTS DOCUMENT: HCPCS | Performed by: OTOLARYNGOLOGY

## 2024-03-26 PROCEDURE — G8427 DOCREV CUR MEDS BY ELIG CLIN: HCPCS | Performed by: OTOLARYNGOLOGY

## 2024-03-26 PROCEDURE — 3017F COLORECTAL CA SCREEN DOC REV: CPT | Performed by: OTOLARYNGOLOGY

## 2024-03-26 PROCEDURE — 99214 OFFICE O/P EST MOD 30 MIN: CPT | Performed by: OTOLARYNGOLOGY

## 2024-03-26 NOTE — TELEPHONE ENCOUNTER
Future Appointments   Date Time Provider Department Center   3/26/2024  9:30 AM Maciej العراقي MD N ENT [facility] [place]   9/25/2024  2:15 PM Aroldo Liao MD ENDO [facility] [place]    Recent Visits  Date Type Provider Dept   10/03/23 Office Visit Leonardo Griggs APRN - CNP [facility]   04/18/23 Office Visit Leonardo Griggs APRN - CNP [facility]   Showing recent visits within past 540 days with a meds authorizing provider and meeting all other requirements  Future Appointments  No visits were found meeting these conditions.  Showing future appointments within next 150 days with a meds authorizing provider and meeting all other requirements

## 2024-03-26 NOTE — PROGRESS NOTES
CO2 25 04/27/2021 06:06 PM    CO2 26 02/04/2020 11:39 AM    BUN 19 05/27/2022 01:48 PM    BUN 11 04/27/2021 06:06 PM    BUN 8 02/04/2020 11:39 AM    CREATININE 0.7 05/27/2022 01:48 PM    CREATININE 0.7 04/27/2021 06:06 PM    CREATININE 0.7 02/04/2020 11:39 AM    CALCIUM 9.7 05/27/2022 01:48 PM    CALCIUM 9.6 04/27/2021 06:06 PM    CALCIUM 9.7 02/04/2020 11:39 AM    PROT 7.6 05/27/2022 01:48 PM    PROT 7.7 04/27/2021 06:06 PM    PROT 7.3 02/04/2020 11:39 AM    LABALBU 4.6 05/27/2022 01:48 PM    LABALBU 4.6 04/27/2021 06:06 PM    LABALBU 4.4 02/04/2020 11:39 AM    BILITOT 0.4 05/27/2022 01:48 PM    BILITOT 0.4 04/27/2021 06:06 PM    BILITOT 0.3 02/04/2020 11:39 AM    ALKPHOS 80 05/27/2022 01:48 PM    ALKPHOS 67 04/27/2021 06:06 PM    ALKPHOS 67 02/04/2020 11:39 AM    AST 16 05/27/2022 01:48 PM    AST 22 04/27/2021 06:06 PM    AST 19 02/04/2020 11:39 AM    ALT 12 05/27/2022 01:48 PM    ALT 12 04/27/2021 06:06 PM    ALT 16 02/04/2020 11:39 AM       All of the past medical history, past surgical history, family history,social history, allergies and current medications were reviewed with the patient.     Assessment & Plan   Diagnoses and all orders for this visit:     Diagnosis Orders   1. Cervical lymphadenopathy        2. Gastroesophageal reflux disease with esophagitis without hemorrhage        3. Pharyngoesophageal dysphagia        4. Cricopharyngeus muscle dysfunction        5. Cervicalgia            The findings were explained and her questions were answered.  Based on her history and recent reports, the patient has strong evidence for having GERD and therefore the likely cause of her upper esophageal symptoms is the presence of GERD that makes her upper esophageal sphincter tight if not dysfunctional.  I therefore did recommend that she actually take the prescription of famotidine that I gave her and do her best to avoid the \"5C's\" in the form of caffeine carbonation such as chocolate and cocktails.  She agreed

## 2024-03-27 RX ORDER — LEVOTHYROXINE SODIUM 0.07 MG/1
75 TABLET ORAL DAILY
Qty: 90 TABLET | Refills: 1 | Status: SHIPPED | OUTPATIENT
Start: 2024-03-27

## 2024-05-10 ENCOUNTER — OFFICE VISIT (OUTPATIENT)
Dept: FAMILY MEDICINE CLINIC | Age: 69
End: 2024-05-10
Payer: MEDICARE

## 2024-05-10 VITALS
WEIGHT: 140 LBS | TEMPERATURE: 97.3 F | HEART RATE: 93 BPM | RESPIRATION RATE: 12 BRPM | DIASTOLIC BLOOD PRESSURE: 80 MMHG | SYSTOLIC BLOOD PRESSURE: 124 MMHG | HEIGHT: 68 IN | OXYGEN SATURATION: 95 % | BODY MASS INDEX: 21.22 KG/M2

## 2024-05-10 DIAGNOSIS — R73.03 PREDIABETES: ICD-10-CM

## 2024-05-10 DIAGNOSIS — J44.9 CHRONIC OBSTRUCTIVE PULMONARY DISEASE, UNSPECIFIED COPD TYPE (HCC): ICD-10-CM

## 2024-05-10 DIAGNOSIS — Z12.31 ENCOUNTER FOR SCREENING MAMMOGRAM FOR MALIGNANT NEOPLASM OF BREAST: ICD-10-CM

## 2024-05-10 DIAGNOSIS — Q87.81 HEREDITARY NEPHROPATHY (ALPORT'S): ICD-10-CM

## 2024-05-10 DIAGNOSIS — E55.9 VITAMIN D DEFICIENCY: ICD-10-CM

## 2024-05-10 DIAGNOSIS — R53.82 CHRONIC FATIGUE: ICD-10-CM

## 2024-05-10 DIAGNOSIS — S92.351A CLOSED DISPLACED FRACTURE OF FIFTH METATARSAL BONE OF RIGHT FOOT, INITIAL ENCOUNTER: Primary | ICD-10-CM

## 2024-05-10 DIAGNOSIS — Z91.81 AT HIGH RISK FOR FALLS: ICD-10-CM

## 2024-05-10 PROCEDURE — G8399 PT W/DXA RESULTS DOCUMENT: HCPCS | Performed by: NURSE PRACTITIONER

## 2024-05-10 PROCEDURE — 99214 OFFICE O/P EST MOD 30 MIN: CPT | Performed by: NURSE PRACTITIONER

## 2024-05-10 PROCEDURE — 3023F SPIROM DOC REV: CPT | Performed by: NURSE PRACTITIONER

## 2024-05-10 PROCEDURE — 1123F ACP DISCUSS/DSCN MKR DOCD: CPT | Performed by: NURSE PRACTITIONER

## 2024-05-10 PROCEDURE — 1090F PRES/ABSN URINE INCON ASSESS: CPT | Performed by: NURSE PRACTITIONER

## 2024-05-10 PROCEDURE — 1036F TOBACCO NON-USER: CPT | Performed by: NURSE PRACTITIONER

## 2024-05-10 PROCEDURE — G8420 CALC BMI NORM PARAMETERS: HCPCS | Performed by: NURSE PRACTITIONER

## 2024-05-10 PROCEDURE — 3017F COLORECTAL CA SCREEN DOC REV: CPT | Performed by: NURSE PRACTITIONER

## 2024-05-10 PROCEDURE — G8428 CUR MEDS NOT DOCUMENT: HCPCS | Performed by: NURSE PRACTITIONER

## 2024-05-10 RX ORDER — LEVALBUTEROL TARTRATE 45 UG/1
1-2 AEROSOL, METERED ORAL EVERY 4 HOURS PRN
Qty: 15 G | Refills: 3 | Status: SHIPPED | OUTPATIENT
Start: 2024-05-10

## 2024-05-10 SDOH — ECONOMIC STABILITY: FOOD INSECURITY: WITHIN THE PAST 12 MONTHS, THE FOOD YOU BOUGHT JUST DIDN'T LAST AND YOU DIDN'T HAVE MONEY TO GET MORE.: NEVER TRUE

## 2024-05-10 SDOH — ECONOMIC STABILITY: INCOME INSECURITY: HOW HARD IS IT FOR YOU TO PAY FOR THE VERY BASICS LIKE FOOD, HOUSING, MEDICAL CARE, AND HEATING?: NOT HARD AT ALL

## 2024-05-10 SDOH — ECONOMIC STABILITY: HOUSING INSECURITY
IN THE LAST 12 MONTHS, WAS THERE A TIME WHEN YOU DID NOT HAVE A STEADY PLACE TO SLEEP OR SLEPT IN A SHELTER (INCLUDING NOW)?: NO

## 2024-05-10 SDOH — ECONOMIC STABILITY: FOOD INSECURITY: WITHIN THE PAST 12 MONTHS, YOU WORRIED THAT YOUR FOOD WOULD RUN OUT BEFORE YOU GOT MONEY TO BUY MORE.: NEVER TRUE

## 2024-05-10 ASSESSMENT — PATIENT HEALTH QUESTIONNAIRE - PHQ9
SUM OF ALL RESPONSES TO PHQ9 QUESTIONS 1 & 2: 0
4. FEELING TIRED OR HAVING LITTLE ENERGY: NOT AT ALL
6. FEELING BAD ABOUT YOURSELF - OR THAT YOU ARE A FAILURE OR HAVE LET YOURSELF OR YOUR FAMILY DOWN: NOT AT ALL
8. MOVING OR SPEAKING SO SLOWLY THAT OTHER PEOPLE COULD HAVE NOTICED. OR THE OPPOSITE, BEING SO FIGETY OR RESTLESS THAT YOU HAVE BEEN MOVING AROUND A LOT MORE THAN USUAL: NOT AT ALL
9. THOUGHTS THAT YOU WOULD BE BETTER OFF DEAD, OR OF HURTING YOURSELF: NOT AT ALL
5. POOR APPETITE OR OVEREATING: NOT AT ALL
2. FEELING DOWN, DEPRESSED OR HOPELESS: NOT AT ALL
SUM OF ALL RESPONSES TO PHQ QUESTIONS 1-9: 0
SUM OF ALL RESPONSES TO PHQ QUESTIONS 1-9: 0
3. TROUBLE FALLING OR STAYING ASLEEP: NOT AT ALL
1. LITTLE INTEREST OR PLEASURE IN DOING THINGS: NOT AT ALL
SUM OF ALL RESPONSES TO PHQ QUESTIONS 1-9: 0
7. TROUBLE CONCENTRATING ON THINGS, SUCH AS READING THE NEWSPAPER OR WATCHING TELEVISION: NOT AT ALL
SUM OF ALL RESPONSES TO PHQ QUESTIONS 1-9: 0
10. IF YOU CHECKED OFF ANY PROBLEMS, HOW DIFFICULT HAVE THESE PROBLEMS MADE IT FOR YOU TO DO YOUR WORK, TAKE CARE OF THINGS AT HOME, OR GET ALONG WITH OTHER PEOPLE: NOT DIFFICULT AT ALL

## 2024-05-10 NOTE — PROGRESS NOTES
Use    Vaping Use: Never used   Substance and Sexual Activity    Alcohol use: Never     Comment: rare at holidays    Drug use: No    Sexual activity: Not on file   Other Topics Concern    Not on file   Social History Narrative    Not on file     Social Determinants of Health     Financial Resource Strain: Low Risk  (5/10/2024)    Overall Financial Resource Strain (CARDIA)     Difficulty of Paying Living Expenses: Not hard at all   Food Insecurity: No Food Insecurity (5/10/2024)    Hunger Vital Sign     Worried About Running Out of Food in the Last Year: Never true     Ran Out of Food in the Last Year: Never true   Transportation Needs: Unknown (5/10/2024)    PRAPARE - Transportation     Lack of Transportation (Medical): Not on file     Lack of Transportation (Non-Medical): No   Physical Activity: Not on file   Stress: Not on file   Social Connections: Not on file   Intimate Partner Violence: Not on file   Housing Stability: Unknown (5/10/2024)    Housing Stability Vital Sign     Unable to Pay for Housing in the Last Year: Not on file     Number of Places Lived in the Last Year: Not on file     Unstable Housing in the Last Year: No       Family History   Problem Relation Age of Onset    Heart Disease Mother     Heart Disease Father     Other Sister         Torres's Esophagus    Other Other         Growth of esophagus    Diabetes Brother     Other Other         Unknown but did fall and hit head    Breast Cancer Maternal Aunt 70         I have reviewed the patient's past medical history, past surgical history, allergies, medications, social and family history and I have made updates where appropriate.      Review of Systems  Positive responses are highlighted in bold    Constitutional:  Fever, Chills, Night Sweats, Fatigue, Unexpected changes in weight  Eyes:  Eye discharge, Eye pain, Eye redness, Visual disturbances   HENT:  Ear pain, Tinnitus, Nosebleeds, Trouble swallowing, Hearing loss, Sore

## 2024-05-13 ENCOUNTER — TELEPHONE (OUTPATIENT)
Dept: FAMILY MEDICINE CLINIC | Age: 69
End: 2024-05-13

## 2024-05-13 DIAGNOSIS — S92.351A CLOSED DISPLACED FRACTURE OF FIFTH METATARSAL BONE OF RIGHT FOOT, INITIAL ENCOUNTER: Primary | ICD-10-CM

## 2024-05-13 NOTE — TELEPHONE ENCOUNTER
Pt called and asking for a referral to Dr. OSCAR Hernández at [facility] for her displaced fracture of fifth metatarsal of right foot.

## 2024-05-17 ENCOUNTER — HOSPITAL ENCOUNTER (EMERGENCY)
Age: 69
Discharge: HOME | End: 2024-05-17
Payer: MEDICARE

## 2024-05-17 ENCOUNTER — HOSPITAL ENCOUNTER (OUTPATIENT)
Age: 69
Discharge: HOME | End: 2024-05-17
Payer: MEDICARE

## 2024-05-17 ENCOUNTER — APPOINTMENT (OUTPATIENT)
Dept: GENERAL RADIOLOGY | Age: 69
End: 2024-05-17
Payer: MEDICARE

## 2024-05-17 VITALS
HEART RATE: 77 BPM | BODY MASS INDEX: 21.22 KG/M2 | SYSTOLIC BLOOD PRESSURE: 127 MMHG | TEMPERATURE: 98.4 F | RESPIRATION RATE: 16 BRPM | HEIGHT: 68 IN | WEIGHT: 140 LBS | DIASTOLIC BLOOD PRESSURE: 80 MMHG | OXYGEN SATURATION: 98 %

## 2024-05-17 DIAGNOSIS — R53.82 CHRONIC FATIGUE: ICD-10-CM

## 2024-05-17 DIAGNOSIS — Q87.81 HEREDITARY NEPHROPATHY (ALPORT'S): ICD-10-CM

## 2024-05-17 DIAGNOSIS — E55.9 VITAMIN D DEFICIENCY: ICD-10-CM

## 2024-05-17 DIAGNOSIS — R73.03 PREDIABETES: ICD-10-CM

## 2024-05-17 DIAGNOSIS — S92.534A CLOSED NONDISPLACED FRACTURE OF DISTAL PHALANX OF LESSER TOE OF RIGHT FOOT, INITIAL ENCOUNTER: Primary | ICD-10-CM

## 2024-05-17 LAB
ALBUMIN SERPL BCG-MCNC: 4.2 G/DL (ref 3.5–5.1)
ALP SERPL-CCNC: 195 U/L (ref 38–126)
ALT SERPL W/O P-5'-P-CCNC: 114 U/L (ref 11–66)
ANION GAP SERPL CALC-SCNC: 14 MEQ/L (ref 8–16)
AST SERPL-CCNC: 108 U/L (ref 5–40)
BACTERIA: NORMAL
BASOPHILS ABSOLUTE: 0 THOU/MM3 (ref 0–0.1)
BASOPHILS NFR BLD AUTO: 0.5 %
BILIRUB SERPL-MCNC: 0.5 MG/DL (ref 0.3–1.2)
BILIRUB UR QL STRIP: NEGATIVE
BUN SERPL-MCNC: 11 MG/DL (ref 7–22)
CALCIUM SERPL-MCNC: 9.6 MG/DL (ref 8.5–10.5)
CASTS #/AREA URNS LPF: NORMAL /LPF
CASTS #/AREA URNS LPF: NORMAL /LPF
CHARACTER UR: CLEAR
CHARCOAL URNS QL MICRO: NORMAL
CHLORIDE SERPL-SCNC: 99 MEQ/L (ref 98–111)
CO2 SERPL-SCNC: 25 MEQ/L (ref 23–33)
COLOR UR: YELLOW
CREAT SERPL-MCNC: 0.6 MG/DL (ref 0.4–1.2)
CRYSTALS URNS QL MICRO: NORMAL
DEPRECATED MEAN GLUCOSE BLD GHB EST-ACNC: 108 MG/DL (ref 70–126)
DEPRECATED RDW RBC AUTO: 43.9 FL (ref 35–45)
EOSINOPHIL NFR BLD AUTO: 2.3 %
EOSINOPHILS ABSOLUTE: 0.2 THOU/MM3 (ref 0–0.4)
EPITHELIAL CELLS, UA: NORMAL /HPF
ERYTHROCYTE [DISTWIDTH] IN BLOOD BY AUTOMATED COUNT: 12.9 % (ref 11.5–14.5)
GFR SERPL CREATININE-BSD FRML MDRD: > 90 ML/MIN/1.73M2
GLUCOSE SERPL-MCNC: 123 MG/DL (ref 70–108)
GLUCOSE UR QL STRIP.AUTO: NEGATIVE MG/DL
HBA1C MFR BLD HPLC: 5.6 % (ref 4.4–6.4)
HCT VFR BLD AUTO: 39.4 % (ref 37–47)
HGB BLD-MCNC: 12.9 GM/DL (ref 12–16)
HGB UR QL STRIP.AUTO: NEGATIVE
IMM GRANULOCYTES # BLD AUTO: 0.02 THOU/MM3 (ref 0–0.07)
IMM GRANULOCYTES NFR BLD AUTO: 0.3 %
KETONES UR QL STRIP.AUTO: NEGATIVE
LEUKOCYTE ESTERASE UR QL STRIP.AUTO: NEGATIVE
LYMPHOCYTES ABSOLUTE: 1.4 THOU/MM3 (ref 1–4.8)
LYMPHOCYTES NFR BLD AUTO: 21.2 %
MCH RBC QN AUTO: 30.4 PG (ref 26–33)
MCHC RBC AUTO-ENTMCNC: 32.7 GM/DL (ref 32.2–35.5)
MCV RBC AUTO: 92.9 FL (ref 81–99)
MONOCYTES ABSOLUTE: 0.7 THOU/MM3 (ref 0.4–1.3)
MONOCYTES NFR BLD AUTO: 9.8 %
NEUTROPHILS ABSOLUTE: 4.4 THOU/MM3 (ref 1.8–7.7)
NEUTROPHILS NFR BLD AUTO: 65.9 %
NITRITE UR QL STRIP.AUTO: NEGATIVE
NRBC BLD AUTO-RTO: 0 /100 WBC
PH UR STRIP.AUTO: 7 [PH] (ref 5–9)
PLATELET # BLD AUTO: 233 THOU/MM3 (ref 130–400)
PMV BLD AUTO: 10.7 FL (ref 9.4–12.4)
POTASSIUM SERPL-SCNC: 4.3 MEQ/L (ref 3.5–5.2)
PROT SERPL-MCNC: 7.6 G/DL (ref 6.1–8)
PROT UR STRIP.AUTO-MCNC: NEGATIVE MG/DL
RBC # BLD AUTO: 4.24 MILL/MM3 (ref 4.2–5.4)
RBC #/AREA URNS HPF: NORMAL /HPF
RENAL EPI CELLS #/AREA URNS HPF: NORMAL /[HPF]
SODIUM SERPL-SCNC: 138 MEQ/L (ref 135–145)
SPECIFIC GRAVITY UA: 1.01 (ref 1–1.03)
UROBILINOGEN, URINE: 0.2 EU/DL (ref 0–1)
WBC # BLD AUTO: 6.7 THOU/MM3 (ref 4.8–10.8)
WBC #/AREA URNS HPF: NORMAL /HPF
YEAST LIKE FUNGI URNS QL MICRO: NORMAL

## 2024-05-17 PROCEDURE — 85025 COMPLETE CBC W/AUTO DIFF WBC: CPT

## 2024-05-17 PROCEDURE — 6360000002 HC RX W HCPCS: Performed by: NURSE PRACTITIONER

## 2024-05-17 PROCEDURE — 81001 URINALYSIS AUTO W/SCOPE: CPT

## 2024-05-17 PROCEDURE — 82306 VITAMIN D 25 HYDROXY: CPT

## 2024-05-17 PROCEDURE — 99214 OFFICE O/P EST MOD 30 MIN: CPT

## 2024-05-17 PROCEDURE — 80053 COMPREHEN METABOLIC PANEL: CPT

## 2024-05-17 PROCEDURE — 82607 VITAMIN B-12: CPT

## 2024-05-17 PROCEDURE — 73630 X-RAY EXAM OF FOOT: CPT

## 2024-05-17 PROCEDURE — 83036 HEMOGLOBIN GLYCOSYLATED A1C: CPT

## 2024-05-17 PROCEDURE — 99213 OFFICE O/P EST LOW 20 MIN: CPT | Performed by: NURSE PRACTITIONER

## 2024-05-17 PROCEDURE — 36415 COLL VENOUS BLD VENIPUNCTURE: CPT

## 2024-05-17 PROCEDURE — 96372 THER/PROPH/DIAG INJ SC/IM: CPT

## 2024-05-17 RX ORDER — HYDROXYZINE PAMOATE 25 MG/1
25 CAPSULE ORAL 3 TIMES DAILY PRN
Qty: 21 CAPSULE | Refills: 0 | Status: SHIPPED | OUTPATIENT
Start: 2024-05-17 | End: 2024-05-24

## 2024-05-17 RX ORDER — METHYLPREDNISOLONE ACETATE 80 MG/ML
80 INJECTION, SUSPENSION INTRA-ARTICULAR; INTRALESIONAL; INTRAMUSCULAR; SOFT TISSUE ONCE
Status: COMPLETED | OUTPATIENT
Start: 2024-05-17 | End: 2024-05-17

## 2024-05-17 RX ADMIN — METHYLPREDNISOLONE ACETATE 80 MG: 80 INJECTION, SUSPENSION INTRA-ARTICULAR; INTRALESIONAL; INTRAMUSCULAR; SOFT TISSUE at 18:25

## 2024-05-17 ASSESSMENT — ENCOUNTER SYMPTOMS
COUGH: 0
SWOLLEN GLANDS: 0
THROAT SWELLING: 0
NAUSEA: 0
APNEA: 0
HOARSE VOICE: 0
HEMOPTYSIS: 0
ABDOMINAL PAIN: 0
DIARRHEA: 0
WHEEZING: 0
PERI-ORBITAL EDEMA: 0
CHEST TIGHTNESS: 0
CHOKING: 0
VOMITING: 0
STRIDOR: 0
SORE THROAT: 0
SHORTNESS OF BREATH: 1

## 2024-05-17 ASSESSMENT — PAIN - FUNCTIONAL ASSESSMENT: PAIN_FUNCTIONAL_ASSESSMENT: NONE - DENIES PAIN

## 2024-05-17 NOTE — ED PROVIDER NOTES
[facility] URGENT CARE  Urgent Care Encounter       CHIEF COMPLAINT       Chief Complaint   Patient presents with    Rash     X 2 days    Shortness of Breath     3 days    Wound Check     Right 2 outer toes red        Nurses Notes reviewed and I agree except as noted in the HPI.  HISTORY OF PRESENT ILLNESS   Elysia Buckner is a 68 y.o. female who presents to Urgent Care with c/o: Rash, SOB, wound recheck    The history is provided by the patient. No  was used.   Shortness of Breath  Severity:  Mild  Onset quality:  Gradual  Duration:  2 weeks  Timing:  Intermittent  Progression:  Waxing and waning  Chronicity:  New  Context: activity    Context: not animal exposure, not emotional upset, not fumes, not known allergens, not occupational exposure, not pollens, not smoke exposure, not strong odors, not URI and not weather changes    Relieved by:  Rest  Worsened by:  Activity  Ineffective treatments:  None tried  Associated symptoms: rash    Associated symptoms: no abdominal pain, no chest pain, no claudication, no cough, no diaphoresis, no ear pain, no fever, no headaches, no hemoptysis, no neck pain, no PND, no sore throat, no sputum production, no syncope, no swollen glands, no vomiting and no wheezing    Risk factors: no recent alcohol use, no family hx of DVT, no obesity, no oral contraceptive use, no prolonged immobilization, no recent surgery and no tobacco use    Rash  Location:  Torso and pelvis  Torso rash location:  Abd LUQ, abd RUQ, abd RLQ, abd LLQ, R chest and L chest  Pelvic rash location:  Pelvis  Quality: itchiness and redness    Quality: not blistering, not bruising, not burning, not draining, not dry, not painful, not peeling, not scaling, not swelling and not weeping    Severity:  Moderate  Onset quality:  Sudden  Duration:  2 days  Timing:  Intermittent  Progression:  Waxing and waning  Chronicity:  New  Context: not animal contact, not chemical exposure, not  Medication List as of 5/17/2024  6:57 PM          Paola Snow    (Please note that portions of this note were completed with a voice recognition program. Efforts were made to edit the dictations but occasionally words are mis-transcribed.)          Paola Snow  05/17/24 [id number]

## 2024-05-17 NOTE — ED PROVIDER NOTES
[facility] URGENT CARE  Urgent Care Encounter      CHIEF COMPLAINT       Chief Complaint   Patient presents with    Rash     X 2 days    Shortness of Breath     3 days    Wound Check     Right 2 outer toes red        Nurses Notes reviewed and I agree except as noted in the HPI.  HISTORY OFPRESENT ILLNESS   Elysia Buckner is a 68 y.o.  The history is provided by the patient. No  was used.   Foot Problem  Location:  Foot and toe  Time since incident:  2 weeks  Injury: yes    Mechanism of injury: crush    Crush:     Mechanism: kicked cabinet at [facility] in [place].  Foot location:  R foot  Toe location:  R little toe and R fourth toe  Pain details:     Quality:  Pressure and throbbing    Radiates to:  Does not radiate    Severity:  Severe    Onset quality:  Gradual (gene taped twice)    Timing:  Constant    Progression:  Worsening  Chronicity:  New  Dislocation: no    Foreign body present: wrapped with tape tight possible abrasion.  Tetanus status:  Unknown  Associated symptoms: fatigue    Associated symptoms: no back pain, no fever and no neck pain        REVIEW OF SYSTEMS     Review of Systems   Constitutional:  Positive for activity change and fatigue. Negative for appetite change, chills, diaphoresis, fever and unexpected weight change.   Respiratory:  Positive for shortness of breath. Negative for apnea, cough, choking, chest tightness, wheezing and stridor.    Cardiovascular:  Negative for chest pain, palpitations and leg swelling.   Musculoskeletal:  Positive for gait problem, joint swelling and myalgias. Negative for arthralgias, back pain, neck pain and neck stiffness.       PAST MEDICAL HISTORY         Diagnosis Date    Arthritis     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    Bradycardia     Cancer (HCC)     cervical    Chronic fatigue syndrome     COPD (chronic obstructive pulmonary disease) (HCC)     Degenerative cervical disc     Depression     Diverticulitis of

## 2024-05-17 NOTE — DISCHARGE INSTRUCTIONS
Take Medication as Directed  Don't scratch  Monitor for any increase in size or spreading  Monitor for fever or chills  Keep drainage covered if any.  Follow up with your PCP or return as needed  Or go to the emergency Department

## 2024-05-18 LAB
25(OH)D3 SERPL-MCNC: 52 NG/ML (ref 30–100)
VIT B12 SERPL-MCNC: 655 PG/ML (ref 211–911)

## 2024-05-18 ASSESSMENT — ENCOUNTER SYMPTOMS
APNEA: 0
CHOKING: 0
CHEST TIGHTNESS: 0
WHEEZING: 0
BACK PAIN: 0
SHORTNESS OF BREATH: 1
COUGH: 0

## 2024-05-20 ENCOUNTER — OFFICE VISIT (OUTPATIENT)
Dept: FAMILY MEDICINE CLINIC | Age: 69
End: 2024-05-20
Payer: MEDICARE

## 2024-05-20 ENCOUNTER — TELEPHONE (OUTPATIENT)
Dept: FAMILY MEDICINE CLINIC | Age: 69
End: 2024-05-20

## 2024-05-20 ENCOUNTER — PATIENT MESSAGE (OUTPATIENT)
Dept: FAMILY MEDICINE CLINIC | Age: 69
End: 2024-05-20

## 2024-05-20 VITALS
RESPIRATION RATE: 14 BRPM | HEIGHT: 68 IN | WEIGHT: 137.8 LBS | BODY MASS INDEX: 20.88 KG/M2 | OXYGEN SATURATION: 95 % | HEART RATE: 63 BPM | TEMPERATURE: 98.3 F

## 2024-05-20 DIAGNOSIS — R74.8 ELEVATED LIVER ENZYMES: ICD-10-CM

## 2024-05-20 DIAGNOSIS — R51.9 NONINTRACTABLE HEADACHE, UNSPECIFIED CHRONICITY PATTERN, UNSPECIFIED HEADACHE TYPE: ICD-10-CM

## 2024-05-20 DIAGNOSIS — J02.9 SORE THROAT: Primary | ICD-10-CM

## 2024-05-20 DIAGNOSIS — R21 RASH AND NONSPECIFIC SKIN ERUPTION: ICD-10-CM

## 2024-05-20 DIAGNOSIS — R31.29 MICROSCOPIC HEMATURIA: ICD-10-CM

## 2024-05-20 DIAGNOSIS — K13.70 ORAL LESION: ICD-10-CM

## 2024-05-20 LAB
INFLUENZA A ANTIBODY: NEGATIVE
INFLUENZA B ANTIBODY: NEGATIVE
Lab: NORMAL
QC PASS/FAIL: NORMAL
S PYO AG THROAT QL: NORMAL
SARS-COV-2 RDRP RESP QL NAA+PROBE: NEGATIVE

## 2024-05-20 PROCEDURE — 1123F ACP DISCUSS/DSCN MKR DOCD: CPT | Performed by: NURSE PRACTITIONER

## 2024-05-20 PROCEDURE — 87635 SARS-COV-2 COVID-19 AMP PRB: CPT | Performed by: NURSE PRACTITIONER

## 2024-05-20 PROCEDURE — 1090F PRES/ABSN URINE INCON ASSESS: CPT | Performed by: NURSE PRACTITIONER

## 2024-05-20 PROCEDURE — 1036F TOBACCO NON-USER: CPT | Performed by: NURSE PRACTITIONER

## 2024-05-20 PROCEDURE — G8427 DOCREV CUR MEDS BY ELIG CLIN: HCPCS | Performed by: NURSE PRACTITIONER

## 2024-05-20 PROCEDURE — 3017F COLORECTAL CA SCREEN DOC REV: CPT | Performed by: NURSE PRACTITIONER

## 2024-05-20 PROCEDURE — 87804 INFLUENZA ASSAY W/OPTIC: CPT | Performed by: NURSE PRACTITIONER

## 2024-05-20 PROCEDURE — G8399 PT W/DXA RESULTS DOCUMENT: HCPCS | Performed by: NURSE PRACTITIONER

## 2024-05-20 PROCEDURE — 87880 STREP A ASSAY W/OPTIC: CPT | Performed by: NURSE PRACTITIONER

## 2024-05-20 PROCEDURE — 99214 OFFICE O/P EST MOD 30 MIN: CPT | Performed by: NURSE PRACTITIONER

## 2024-05-20 PROCEDURE — G8420 CALC BMI NORM PARAMETERS: HCPCS | Performed by: NURSE PRACTITIONER

## 2024-05-20 RX ORDER — CEPHALEXIN 500 MG/1
500 CAPSULE ORAL 3 TIMES DAILY
Qty: 21 CAPSULE | Refills: 0 | Status: SHIPPED | OUTPATIENT
Start: 2024-05-20 | End: 2024-05-27

## 2024-05-20 NOTE — TELEPHONE ENCOUNTER
From: Elysia Buckner  To: Leonardo Griggs  Sent: 5/20/2024 1:38 AM EDT  Subject: Recent bloodwork last Friday     Nathaniel Patterson. Good morning. I went to the [facility] urgent care with a body rash entire body. It was red, warm to the touch and raised. No itch at that time, no fever. Previous to that for a few days had night sweats, headache and slight nausea. I received a steroid shot for an “inflammatory response”. After the shot, they did my bloodwork and clean urine catch you ordered. I was concerned doing it after the steroid shot because I asked if it could possibly alter some results…they said yes, yet waiting a few days the results could b more altered because it would be into my system more. I almost didn’t do the bloodwork but then asked if it could possibly help identify the cause of the rash, headache and slight nausea. I was told “possibly”. Wasn’t sure, but had the work done. (Just wanted u to b aware in case there is a way to determine if steroid may have thrown off results, or if the results help to identify the cause of my symptoms.) I quit Benadryl and melatonin as well as   recent reentry of Aleve for my broken foot. After 3 days of headache gave into Aleve seemed to relieve. The next day(Saturday) woke w a medium sore throat and Sunday. Nausea only slight. Rash still exists. I have my podiatry appt today, Monday around 1:00 w Dr Hernández that I really need.. strep, flu, (possible COVID) test at quick appoint with u if can squeeze me in to take a look at my throat/rash. Late morning preferable right before the other appt (Sleep problems) alternatively late afternoon appt.

## 2024-05-20 NOTE — TELEPHONE ENCOUNTER
Spoke to Rikki at ENT and she stated they will call the pt and see when the pt can get in for an appointment

## 2024-05-20 NOTE — PROGRESS NOTES
Vaping Use: Never used   Substance Use Topics    Alcohol use: Never     Comment: rare at holidays    Drug use: No       Family History   Problem Relation Age of Onset    Heart Disease Mother     Heart Disease Father     Other Sister         Torres's Esophagus    Other Other         Growth of esophagus    Diabetes Brother     Other Other         Unknown but did fall and hit head    Breast Cancer Maternal Aunt 70         Review of Systems   Constitutional:  Negative for activity change, appetite change and fever.   HENT:  Positive for sore throat.    Gastrointestinal:  Negative for abdominal pain, nausea and vomiting.   Genitourinary: Negative.    Musculoskeletal:  Positive for gait problem.   Neurological:  Positive for headaches.           PHYSICAL EXAM:  Pulse 63   Temp 98.3 °F (36.8 °C) (Temporal)   Resp 14   Ht 1.727 m (5' 8\")   Wt 62.5 kg (137 lb 12.8 oz)   SpO2 95%   BMI 20.95 kg/m²     Physical Exam  Vitals reviewed.   Constitutional:       Appearance: Normal appearance.   HENT:      Mouth/Throat:      Palate: Lesions present.      Comments: Left side of soft palate with cyst like lesion, has yellow center  Wants to see ENT, offered antibiotic  Cardiovascular:      Rate and Rhythm: Regular rhythm.   Pulmonary:      Breath sounds: Normal breath sounds.   Abdominal:      Palpations: Abdomen is soft.   Feet:      Comments: Right foot in walking boot  Skin:     General: Skin is warm and dry.   Neurological:      Mental Status: She is alert and oriented to person, place, and time.   Psychiatric:         Mood and Affect: Mood normal.           ASSESSMENT & PLAN  1. Sore throat  Strep negative  Gargle with salt water  - POCT Influenza A/B  - POCT COVID-19 Rapid, NAAT  - POCT rapid strep A    2. Nonintractable headache, unspecified chronicity pattern, unspecified headache type  Covid and flu negative  - POCT Influenza A/B  - POCT COVID-19 Rapid, NAAT    3. Microscopic hematuria    - Hepatic Function Panel;

## 2024-05-20 NOTE — TELEPHONE ENCOUNTER
----- Message from LAURIE Felix - CNP sent at 5/20/2024 12:31 PM EDT -----  Can we see if Dr العراقي can see pt for possible oral lesion in back of throat, she is current pt of his, at pt request

## 2024-05-22 ENCOUNTER — TELEPHONE (OUTPATIENT)
Dept: FAMILY MEDICINE CLINIC | Age: 69
End: 2024-05-22

## 2024-05-22 ASSESSMENT — ENCOUNTER SYMPTOMS
VOMITING: 0
NAUSEA: 0
ABDOMINAL PAIN: 0
SORE THROAT: 1

## 2024-05-22 NOTE — TELEPHONE ENCOUNTER
Spoke to patient she states that the rash is still there, its not as bad on chest and stomach. Rash has moved to privates and is really itchy. Wants to have a culture grown to see if its a bacterial or what it is. She is concerned now that it is vaginal, her throat is still sore.     She wanted to see Leonardo so I put her on his schedule tomorrow at 3:20

## 2024-05-22 NOTE — TELEPHONE ENCOUNTER
----- Message from LAURIE Felix CNP sent at 5/22/2024 12:42 PM EDT -----  Can we see how pts rash is ?

## 2024-05-23 ENCOUNTER — OFFICE VISIT (OUTPATIENT)
Dept: FAMILY MEDICINE CLINIC | Age: 69
End: 2024-05-23
Payer: MEDICARE

## 2024-05-23 VITALS
RESPIRATION RATE: 12 BRPM | OXYGEN SATURATION: 97 % | BODY MASS INDEX: 20.76 KG/M2 | SYSTOLIC BLOOD PRESSURE: 122 MMHG | TEMPERATURE: 97.3 F | HEIGHT: 68 IN | HEART RATE: 82 BPM | WEIGHT: 137 LBS | DIASTOLIC BLOOD PRESSURE: 62 MMHG

## 2024-05-23 DIAGNOSIS — J39.2 OROPHARYNGEAL LESION: ICD-10-CM

## 2024-05-23 DIAGNOSIS — J02.9 SORE THROAT: Primary | ICD-10-CM

## 2024-05-23 DIAGNOSIS — R21 RASH AND NONSPECIFIC SKIN ERUPTION: ICD-10-CM

## 2024-05-23 PROCEDURE — G8420 CALC BMI NORM PARAMETERS: HCPCS | Performed by: NURSE PRACTITIONER

## 2024-05-23 PROCEDURE — G8399 PT W/DXA RESULTS DOCUMENT: HCPCS | Performed by: NURSE PRACTITIONER

## 2024-05-23 PROCEDURE — 3017F COLORECTAL CA SCREEN DOC REV: CPT | Performed by: NURSE PRACTITIONER

## 2024-05-23 PROCEDURE — G8427 DOCREV CUR MEDS BY ELIG CLIN: HCPCS | Performed by: NURSE PRACTITIONER

## 2024-05-23 PROCEDURE — 99214 OFFICE O/P EST MOD 30 MIN: CPT | Performed by: NURSE PRACTITIONER

## 2024-05-23 PROCEDURE — 1123F ACP DISCUSS/DSCN MKR DOCD: CPT | Performed by: NURSE PRACTITIONER

## 2024-05-23 PROCEDURE — 1090F PRES/ABSN URINE INCON ASSESS: CPT | Performed by: NURSE PRACTITIONER

## 2024-05-23 PROCEDURE — 1036F TOBACCO NON-USER: CPT | Performed by: NURSE PRACTITIONER

## 2024-05-23 NOTE — PROGRESS NOTES
Heartburn, Blood in stool  Genitourinary:  Difficulty or painful urination, Flank pain, Change in frequency, Urgency  Skin:  Color change, Rash, Itching, Wound  Psychiatric:  Hallucinations, Anxiety, Depression, Suicidal ideation  Hematological:  Enlarged glands, Easy bleeding, Easily bruising  Musculoskeletal:  Joint pain, Back pain, Gait problems, Joint swelling, Myalgias  Neurological:  Dizziness, Headaches, Presyncope, Numbness, Seizures, Tremors  Allergy:  Environmental allergies, Food allergies  Endocrine:  Heat Intolerance, Cold Intolerance, Polydipsia, Polyphagia, Polyuria      Chemistry        Component Value Date/Time     05/17/2024 1915    K 4.3 05/17/2024 1915    CL 99 05/17/2024 1915    CO2 25 05/17/2024 1915    BUN 11 05/17/2024 1915    CREATININE 0.6 05/17/2024 1915        Component Value Date/Time    CALCIUM 9.6 05/17/2024 1915    ALKPHOS 195 (H) 05/17/2024 1915     (H) 05/17/2024 1915     (H) 05/17/2024 1915    BILITOT 0.5 05/17/2024 1915        Lab Results   Component Value Date    WBC 6.7 05/17/2024    HGB 12.9 05/17/2024    HCT 39.4 05/17/2024    MCV 92.9 05/17/2024     05/17/2024     Lab Results   Component Value Date    TSH 2.280 01/03/2024     PHYSICAL EXAM:  Vitals:    05/23/24 1528   BP: 122/62   Pulse: 82   Resp: 12   Temp: 97.3 °F (36.3 °C)   TempSrc: Temporal   SpO2: 97%   Weight: 62.1 kg (137 lb)   Height: 1.727 m (5' 8\")         Body mass index is 20.83 kg/m².         VS Reviewed  General Appearance: A&O x 3, No acute distress,well developed and well- nourished  Head: normocephalic and atraumatic  Eyes: pupils equal, round, and reactive to light, extraocular eye movements intact, conjunctivae and eye lids without erythema  ENT: posterior oropharynx normal, small polyp lesion left tonsil  Neck: supple and non-tender without mass, no thyromegaly or thyroid nodules, no cervical lymphadenopathy  Pulmonary/Chest: clear to auscultation bilaterally- no wheezes, rales

## 2024-05-25 LAB — BACTERIA THROAT AEROBE CULT: NORMAL

## 2024-05-28 ENCOUNTER — TELEPHONE (OUTPATIENT)
Dept: FAMILY MEDICINE CLINIC | Age: 69
End: 2024-05-28

## 2024-05-28 ENCOUNTER — TELEPHONE (OUTPATIENT)
Dept: ENT CLINIC | Age: 69
End: 2024-05-28

## 2024-05-28 NOTE — TELEPHONE ENCOUNTER
----- Message from LAURIE Man - CNP sent at 5/28/2024  9:01 AM EDT -----  Let Elysia know her throat culture is normal.

## 2024-05-28 NOTE — TELEPHONE ENCOUNTER
Left message on answering machine requesting pt to call back at earliest convenience to go over results    No HIPAA on file, unable to leave detailed message

## 2024-05-28 NOTE — TELEPHONE ENCOUNTER
Leonardo Griggs's office called and spoke to Cassie in regards to Elysia having an Oropharyngeal lesion.    Please advise where we can schedule this patient.

## 2024-06-03 ENCOUNTER — PATIENT MESSAGE (OUTPATIENT)
Dept: FAMILY MEDICINE CLINIC | Age: 69
End: 2024-06-03

## 2024-06-03 NOTE — TELEPHONE ENCOUNTER
Pt does have f/u LFTs ordered for 4 wks from 5/20  Can get done at [facility], it's in the system  Please go over any prep with pt re: renal US  If pt still has concerns over Alports, then I would have her make an apt with Leonardo to discuss in person upon his return  Let me know if questions, thanks!

## 2024-06-03 NOTE — TELEPHONE ENCOUNTER
From: Elysia Buckner  To: Leonardo DE LA CRUZ [place]  Sent: 6/3/2024 11:09 AM EDT  Subject: Liver function     Carlos Patterson.     I don’t see order you mentioned in your last message for repeat liver function tests to be done a few weeks after the steroid shot.     Also I will b having the renal ultrasound soon. I assume there’s no prep but to be well hydrated the day of. Also with no blood in urine you didn’t suspect Alports and to go ahead though w the renal ultrasound (which I will.) is there a blood test for Alports? Maybe genetic test? I thought I had heard that from my daughter, but not sure. I could ask her what she had.     Heather WILKINSON

## 2024-06-04 NOTE — TELEPHONE ENCOUNTER
Recommend scheduling a f/u apt with Leonadro for next wk upon his return to discuss this further  Should keep doing this discussion about Alports via [email or web address], thanks!

## 2024-06-05 ENCOUNTER — OFFICE VISIT (OUTPATIENT)
Dept: ENT CLINIC | Age: 69
End: 2024-06-05
Payer: MEDICARE

## 2024-06-05 VITALS
SYSTOLIC BLOOD PRESSURE: 104 MMHG | HEIGHT: 68 IN | BODY MASS INDEX: 21.07 KG/M2 | TEMPERATURE: 97.4 F | RESPIRATION RATE: 20 BRPM | HEART RATE: 68 BPM | WEIGHT: 139 LBS | DIASTOLIC BLOOD PRESSURE: 70 MMHG

## 2024-06-05 DIAGNOSIS — J35.8 TONSILLAR CYST: Primary | ICD-10-CM

## 2024-06-05 DIAGNOSIS — R09.89 THROAT CLEARING: ICD-10-CM

## 2024-06-05 PROCEDURE — 1090F PRES/ABSN URINE INCON ASSESS: CPT | Performed by: NURSE PRACTITIONER

## 2024-06-05 PROCEDURE — 99212 OFFICE O/P EST SF 10 MIN: CPT | Performed by: NURSE PRACTITIONER

## 2024-06-05 PROCEDURE — 3017F COLORECTAL CA SCREEN DOC REV: CPT | Performed by: NURSE PRACTITIONER

## 2024-06-05 PROCEDURE — 1123F ACP DISCUSS/DSCN MKR DOCD: CPT | Performed by: NURSE PRACTITIONER

## 2024-06-05 PROCEDURE — G8427 DOCREV CUR MEDS BY ELIG CLIN: HCPCS | Performed by: NURSE PRACTITIONER

## 2024-06-05 PROCEDURE — G8399 PT W/DXA RESULTS DOCUMENT: HCPCS | Performed by: NURSE PRACTITIONER

## 2024-06-05 PROCEDURE — 1036F TOBACCO NON-USER: CPT | Performed by: NURSE PRACTITIONER

## 2024-06-05 PROCEDURE — G8420 CALC BMI NORM PARAMETERS: HCPCS | Performed by: NURSE PRACTITIONER

## 2024-06-05 NOTE — PROGRESS NOTES
History     Tobacco Use    Smoking status: Never     Passive exposure: Yes    Smokeless tobacco: Never   Substance Use Topics    Alcohol use: Never     Comment: rare at holidays        Subjective:      Review of Systems  Rest of review of systems are negative, except as noted in HPI.     Objective:     /70 (Site: Left Upper Arm, Position: Sitting)   Pulse 68   Temp 97.4 °F (36.3 °C) (Infrared)   Resp 20   Ht 1.727 m (5' 7.99\")   Wt 63 kg (139 lb)   BMI 21.14 kg/m²     PHYSICAL EXAM  Constitutional: Oriented to person, place, and time. Appears stated age. Appears well-developed and well-nourished. Voice and speech pattern appropriate for age and gender. No distress. No stridor or audible wheezing. No throat clearing or cough observed.    HENT:   Head: Normocephalic and atraumatic.   Right Ear:  External ear normal. Tympanic membrane intact. Middle ear aerated.    Left Ear:  External ear normal. Tympanic membrane intact. Middle ear aerated.    Nose:  External nose normal. Nasal mucosa normal. No lesions noted.  Mouth/Throat:  Good dentition. Mallampati I oral aperture. Oral cavity mucosa abnormal for presence of ~8mm submucosal lesion in left tonsillar (see photo below). Tonsils atrophic.   Eyes:  Pupils are equal, round, and reactive to light. Conjunctivae and EOM are normal.   Neck:  Normal range of motion. Neck supple. No JVD present. No tracheal deviation present. No thyromegaly present. No cervical lymphadenopathy noted.  Patient is generally tender to palpation throughout the submandibular region.  Cardiovascular:  Normal rate.   Pulmonary/Chest:  Effort normal. No stridor or stertor. No respiratory distress.   Musculoskeletal:  Normal range of motion. No edema or lymphadenopathy.  Neurological:  Alert and oriented to person, place, and time.   Cranial nerve II-XII grossly intact. Anxious.   Skin:  Skin is warm. No erythema.   Psychiatric:  Normal mood and affect. Behavior is normal.         Vitals

## 2024-06-06 ENCOUNTER — HOSPITAL ENCOUNTER (OUTPATIENT)
Dept: ULTRASOUND IMAGING | Age: 69
Discharge: HOME | End: 2024-06-06
Payer: MEDICARE

## 2024-06-06 DIAGNOSIS — R31.29 MICROSCOPIC HEMATURIA: ICD-10-CM

## 2024-06-06 PROCEDURE — 76770 US EXAM ABDO BACK WALL COMP: CPT

## 2024-06-07 ENCOUNTER — TELEPHONE (OUTPATIENT)
Dept: ENT CLINIC | Age: 69
End: 2024-06-07

## 2024-06-07 ENCOUNTER — TELEPHONE (OUTPATIENT)
Dept: FAMILY MEDICINE CLINIC | Age: 69
End: 2024-06-07

## 2024-06-07 NOTE — TELEPHONE ENCOUNTER
Patient informed, verbally understood.    Pt is asking for recommendations for her distended bladder.

## 2024-06-07 NOTE — TELEPHONE ENCOUNTER
It wasn't distended  It was \"incompletely distended\" which just means it wasn't full enough of urine at the time of the US to fully evaluate  There is nothing to recommend as this is a normal finding  Let me know if questions, thanks!

## 2024-06-07 NOTE — TELEPHONE ENCOUNTER
Patient called in stating that she had an appointment with nino for  her growth in her mouth and she stated that nino took a picture of her throat to discuss with Dr العراقي.patient stated that she was just following up and wanted to know what Dr العراقي said. She also stated that she has been having pain in her left ear. Please advise

## 2024-06-07 NOTE — TELEPHONE ENCOUNTER
Left message on answering machine requesting pt to call back at earliest convenience.       No signed HIPAA

## 2024-06-07 NOTE — TELEPHONE ENCOUNTER
Yes, we were not able to discuss yesterday.  I reviewed with him this morning.  He agrees that this looks like a cyst and nothing to do about it. It is not causing any of her other symptoms.  We will keep an eye on it and can look at it again at her follow up to ensure there have been no changes.

## 2024-06-07 NOTE — TELEPHONE ENCOUNTER
----- Message from LAURIE Felix - CNP sent at 6/7/2024  8:52 AM EDT -----  US normal of kidneys

## 2024-06-10 NOTE — TELEPHONE ENCOUNTER
Patient returned our call. Informed patient of what Crystal said.  Patient verbalized understanding and thanked me.

## 2024-06-14 ENCOUNTER — HOSPITAL ENCOUNTER (OUTPATIENT)
Age: 69
Discharge: HOME | End: 2024-06-14
Payer: MEDICARE

## 2024-06-14 DIAGNOSIS — R74.8 ELEVATED LIVER ENZYMES: ICD-10-CM

## 2024-06-14 DIAGNOSIS — R31.29 MICROSCOPIC HEMATURIA: ICD-10-CM

## 2024-06-14 LAB
ALBUMIN SERPL BCG-MCNC: 4.5 G/DL (ref 3.5–5.1)
ALP SERPL-CCNC: 84 U/L (ref 38–126)
ALT SERPL W/O P-5'-P-CCNC: 12 U/L (ref 11–66)
AST SERPL-CCNC: 19 U/L (ref 5–40)
BILIRUB CONJ SERPL-MCNC: < 0.1 MG/DL (ref 0.1–13.8)
BILIRUB SERPL-MCNC: 0.4 MG/DL (ref 0.3–1.2)
PROT SERPL-MCNC: 7.3 G/DL (ref 6.1–8)

## 2024-06-14 PROCEDURE — 36415 COLL VENOUS BLD VENIPUNCTURE: CPT

## 2024-06-14 PROCEDURE — 80076 HEPATIC FUNCTION PANEL: CPT

## 2024-06-17 ENCOUNTER — TELEPHONE (OUTPATIENT)
Dept: FAMILY MEDICINE CLINIC | Age: 69
End: 2024-06-17

## 2024-06-17 ENCOUNTER — OFFICE VISIT (OUTPATIENT)
Dept: FAMILY MEDICINE CLINIC | Age: 69
End: 2024-06-17
Payer: MEDICARE

## 2024-06-17 VITALS
WEIGHT: 138 LBS | TEMPERATURE: 97.5 F | OXYGEN SATURATION: 95 % | RESPIRATION RATE: 12 BRPM | BODY MASS INDEX: 20.92 KG/M2 | HEIGHT: 68 IN | HEART RATE: 84 BPM | DIASTOLIC BLOOD PRESSURE: 62 MMHG | SYSTOLIC BLOOD PRESSURE: 128 MMHG

## 2024-06-17 DIAGNOSIS — Q87.81 ALPORT SYNDROME: ICD-10-CM

## 2024-06-17 DIAGNOSIS — M81.0 POST-MENOPAUSAL OSTEOPOROSIS: ICD-10-CM

## 2024-06-17 DIAGNOSIS — R33.9 INCOMPLETE BLADDER EMPTYING: Primary | ICD-10-CM

## 2024-06-17 LAB
BILIRUBIN, URINE: NEGATIVE
BLOOD URINE, POC: NEGATIVE
CHARACTER, URINE: CLEAR
COLOR: YELLOW
GLUCOSE URINE: NEGATIVE MG/DL
KETONES, URINE: NEGATIVE
LEUKOCYTE CLUMPS, URINE: NEGATIVE
NITRITE, URINE: NEGATIVE
PH, URINE: 6 (ref 5–9)
PROTEIN, URINE: NEGATIVE MG/DL
SPECIFIC GRAVITY UA: 1.02 (ref 1–1.03)
UROBILINOGEN, URINE: 0.2 EU/DL (ref 0–1)

## 2024-06-17 PROCEDURE — 1036F TOBACCO NON-USER: CPT | Performed by: NURSE PRACTITIONER

## 2024-06-17 PROCEDURE — 99214 OFFICE O/P EST MOD 30 MIN: CPT | Performed by: NURSE PRACTITIONER

## 2024-06-17 PROCEDURE — 1123F ACP DISCUSS/DSCN MKR DOCD: CPT | Performed by: NURSE PRACTITIONER

## 2024-06-17 PROCEDURE — G8399 PT W/DXA RESULTS DOCUMENT: HCPCS | Performed by: NURSE PRACTITIONER

## 2024-06-17 PROCEDURE — 3017F COLORECTAL CA SCREEN DOC REV: CPT | Performed by: NURSE PRACTITIONER

## 2024-06-17 PROCEDURE — G8427 DOCREV CUR MEDS BY ELIG CLIN: HCPCS | Performed by: NURSE PRACTITIONER

## 2024-06-17 PROCEDURE — 1090F PRES/ABSN URINE INCON ASSESS: CPT | Performed by: NURSE PRACTITIONER

## 2024-06-17 PROCEDURE — G8420 CALC BMI NORM PARAMETERS: HCPCS | Performed by: NURSE PRACTITIONER

## 2024-06-17 NOTE — PROGRESS NOTES
Biofreeze (Menthol 3.5%)       No current facility-administered medications for this visit.     No orders of the defined types were placed in this encounter.      All medications reviewed and reconciled, including OTC and herbal medications. Updated list given to patient.       Patient Active Problem List   Diagnosis    Sicca syndrome (HCC)    Chronic fatigue    Acquired hypothyroidism    COPD (chronic obstructive pulmonary disease) (HCC)    Chronic neck and back pain    Major depressive disorder, recurrent episode, moderate (HCC)    Gastroesophageal reflux disease with esophagitis    Left hip pain    Postmenopausal osteoporosis    SOB (shortness of breath) on exertion    History of palpitations    Abnormal EKG    Intermittent claudication (HCC)    Pure hypercholesterolemia    Abnormal LFTs    Biallelic mutation of LPA gene    Prediabetes    Pharyngoesophageal dysphagia    Cervical lymphadenopathy    Impacted cerumen of left ear    Cricopharyngeus muscle dysfunction    Cervicalgia       Past Medical History:   Diagnosis Date    Arthritis     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    Bradycardia     Cancer (HCC)     cervical    Chronic fatigue syndrome     COPD (chronic obstructive pulmonary disease) (HCC)     Degenerative cervical disc     Depression     Diverticulitis of colon     Fatigue     Fibromyalgia     Headache     Hiatal hernia     Hyperlipidemia     Postmenopausal osteoporosis 1/17/2017    Prolonged emergence from general anesthesia     Thoracic degenerative disc disease     Thoracic disc herniation     Thyroid disease        Past Surgical History:   Procedure Laterality Date    APPENDECTOMY  05/2017    CERVIX SURGERY      COLONOSCOPY      COLONOSCOPY  3/27/2019    COLONOSCOPY POLYPECTOMY SNARE/COLD BIOPSY performed by Agnieszka Hernandez MD at [facility]    COLONOSCOPY N/A 3/5/2024    COLONOSCOPY performed by Agnieszka Hernandez MD at [facility]    HERNIA REPAIR      UPPER GASTROINTESTINAL ENDOSCOPY

## 2024-06-17 NOTE — TELEPHONE ENCOUNTER
----- Message from LAURIE Felix CNP sent at 6/17/2024 12:57 PM EDT -----  Liver function tests returned to normal

## 2024-06-19 ENCOUNTER — HOSPITAL ENCOUNTER (OUTPATIENT)
Dept: WOMENS IMAGING | Age: 69
Discharge: HOME | End: 2024-06-19
Payer: MEDICARE

## 2024-06-19 DIAGNOSIS — Z12.31 ENCOUNTER FOR SCREENING MAMMOGRAM FOR MALIGNANT NEOPLASM OF BREAST: ICD-10-CM

## 2024-06-19 DIAGNOSIS — M81.0 POST-MENOPAUSAL OSTEOPOROSIS: ICD-10-CM

## 2024-06-19 PROCEDURE — 77080 DXA BONE DENSITY AXIAL: CPT

## 2024-06-19 PROCEDURE — 77063 BREAST TOMOSYNTHESIS BI: CPT

## 2024-06-20 ENCOUNTER — TELEPHONE (OUTPATIENT)
Dept: FAMILY MEDICINE CLINIC | Age: 69
End: 2024-06-20

## 2024-06-20 NOTE — TELEPHONE ENCOUNTER
----- Message from Leonardo Griggs, APRN - CNP sent at 6/20/2024  9:56 AM EDT -----  Let Elysia know her DEXA scan shows osteoporosis in lumbar spine and hips. Considering this, and her recent foot fracture, I would recommend medication. I know she doesn't like medications, but I really do think it would be best. I would consider putting her on Prolia, which is an injection every 6 months. OR, I would consider putting her on Reclast (which is an IV infusion once a year) or Fosamax which is a pill given once a week. The Reclast and Fosamax work similarly to help rebuild the bone, but the Prolia works a little differently. Let me know how she wishes to proceed.

## 2024-06-24 ENCOUNTER — TELEPHONE (OUTPATIENT)
Dept: FAMILY MEDICINE CLINIC | Age: 69
End: 2024-06-24

## 2024-06-24 NOTE — TELEPHONE ENCOUNTER
----- Message from LAURIE Man - CNP sent at 6/24/2024  8:36 AM EDT -----  Let Elysia know her mammogram is normal, rec'd repeating in 2 years.

## 2024-06-24 NOTE — TELEPHONE ENCOUNTER
Left message on answering machine. Requested pt to call back  at their earliest convenience.     No signed HIPAA

## 2024-07-18 ENCOUNTER — TRANSCRIBE ORDERS (OUTPATIENT)
Dept: ADMINISTRATIVE | Age: 69
End: 2024-07-18

## 2024-07-18 DIAGNOSIS — R94.2 ABNORMAL RESULTS OF PULMONARY FUNCTION STUDIES: Primary | ICD-10-CM

## 2024-08-13 ENCOUNTER — OFFICE VISIT (OUTPATIENT)
Dept: ENT CLINIC | Age: 69
End: 2024-08-13
Payer: MEDICARE

## 2024-08-13 VITALS
OXYGEN SATURATION: 98 % | HEART RATE: 75 BPM | DIASTOLIC BLOOD PRESSURE: 70 MMHG | HEIGHT: 68 IN | SYSTOLIC BLOOD PRESSURE: 120 MMHG | BODY MASS INDEX: 21.22 KG/M2 | WEIGHT: 140 LBS | TEMPERATURE: 97 F

## 2024-08-13 DIAGNOSIS — R09.89 THROAT CLEARING: ICD-10-CM

## 2024-08-13 DIAGNOSIS — J35.8 TONSILLAR CYST: ICD-10-CM

## 2024-08-13 DIAGNOSIS — K21.00 GASTROESOPHAGEAL REFLUX DISEASE WITH ESOPHAGITIS WITHOUT HEMORRHAGE: ICD-10-CM

## 2024-08-13 DIAGNOSIS — K22.4 CRICOPHARYNGEUS MUSCLE DYSFUNCTION: ICD-10-CM

## 2024-08-13 DIAGNOSIS — R59.0 CERVICAL LYMPHADENOPATHY: Primary | ICD-10-CM

## 2024-08-13 PROCEDURE — G8399 PT W/DXA RESULTS DOCUMENT: HCPCS | Performed by: OTOLARYNGOLOGY

## 2024-08-13 PROCEDURE — G8420 CALC BMI NORM PARAMETERS: HCPCS | Performed by: OTOLARYNGOLOGY

## 2024-08-13 PROCEDURE — 1123F ACP DISCUSS/DSCN MKR DOCD: CPT | Performed by: OTOLARYNGOLOGY

## 2024-08-13 PROCEDURE — G8427 DOCREV CUR MEDS BY ELIG CLIN: HCPCS | Performed by: OTOLARYNGOLOGY

## 2024-08-13 PROCEDURE — 1090F PRES/ABSN URINE INCON ASSESS: CPT | Performed by: OTOLARYNGOLOGY

## 2024-08-13 PROCEDURE — 3017F COLORECTAL CA SCREEN DOC REV: CPT | Performed by: OTOLARYNGOLOGY

## 2024-08-13 PROCEDURE — 99214 OFFICE O/P EST MOD 30 MIN: CPT | Performed by: OTOLARYNGOLOGY

## 2024-08-13 PROCEDURE — 1036F TOBACCO NON-USER: CPT | Performed by: OTOLARYNGOLOGY

## 2024-08-13 RX ORDER — DIPHENHYDRAMINE HCL 25 MG
25 TABLET ORAL EVERY 6 HOURS PRN
COMMUNITY

## 2024-08-13 NOTE — PROGRESS NOTES
[facility] [place] [facility]  [facility]  [address]  [address]  [facility] [address]  Dept: [phone]  Dept Fax: [phone]  Loc: [phone]    Elysia Buckner is a 69 y.o. female who was referred by No ref. provider found for:  Chief Complaint   Patient presents with    Follow-up     Patient here for follow up for GERD. Patient states that her problem with GERD have been continuing. She has started aloe vera juice. Patient says se has Chronic lung infection. Patient prefers taking pulmonary test and aloe vera juice before she tries taking medications. Patient states that sometimes she chokes on saliva while she's not doing anything. Patient wanting left side of maxi checked again that was examined by nurse practitioner last time. Patient is curious I vocal  chords are involved        HPI:     Elysia Buckner is a 69 y.o. 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  The patient reports no new problems.  We ran through this series of complaints and settled on the list of plans described below.     History:     Allergies   Allergen Reactions    Ciprofloxacin     Erythromycin Itching     Ointment for eyes - become irritated    Lipitor [Atorvastatin] Other (See Comments)     ABNORMAL LIVER TESTS    Percocet [Oxycodone-Acetaminophen] Itching    Robitussin Dm [Dextromethorphan-Guaifenesin]      Swelling of eyes    Sulfa Antibiotics Hives    Augmentin [Amoxicillin-Pot Clavulanate] Diarrhea and Nausea And Vomiting    Codeine Nausea And Vomiting    Pcn [Penicillins] Diarrhea and

## 2024-09-19 ENCOUNTER — TELEPHONE (OUTPATIENT)
Age: 69
End: 2024-09-19

## 2024-09-19 ENCOUNTER — CLINICAL DOCUMENTATION (OUTPATIENT)
Age: 69
End: 2024-09-19

## 2024-09-19 DIAGNOSIS — E03.9 ACQUIRED HYPOTHYROIDISM: Primary | ICD-10-CM

## 2024-09-27 ENCOUNTER — PATIENT MESSAGE (OUTPATIENT)
Dept: FAMILY MEDICINE CLINIC | Age: 69
End: 2024-09-27

## 2024-09-27 DIAGNOSIS — R92.30 DENSE BREAST TISSUE: Primary | ICD-10-CM

## 2024-10-01 ENCOUNTER — PATIENT MESSAGE (OUTPATIENT)
Dept: FAMILY MEDICINE CLINIC | Age: 69
End: 2024-10-01

## 2024-10-01 DIAGNOSIS — H01.006 BLEPHARITIS OF LEFT EYE: ICD-10-CM

## 2024-10-02 RX ORDER — NEOMYCIN/POLYMYXIN B/DEXAMETHA 3.5-10K-.1
0.5 OINTMENT (GRAM) OPHTHALMIC (EYE) 4 TIMES DAILY
Qty: 3.5 G | Refills: 1 | Status: CANCELLED | OUTPATIENT
Start: 2024-10-02

## 2024-10-02 NOTE — TELEPHONE ENCOUNTER
Refill was cancelled and a my chart message has been sent with the information pt was asking for.

## 2024-10-02 NOTE — TELEPHONE ENCOUNTER
Recent Visits  Date Type Provider Dept   06/17/24 Office Visit Leonardo Griggs, LAURIE - CNP [facility]   05/23/24 Office Visit Leonardo Griggs, APRN - CNP [facility]   05/20/24 Office Visit Michelle Eason, LAURIE - CNP [facility]   05/10/24 Office Visit Leonardo Griggs APRN - CNP [facility]   10/03/23 Office Visit Leonardo Griggs APRN - CNP [facility]   04/18/23 Office Visit Leonardo Griggs, LAURIE - CNP [facility]   Showing recent visits within past 540 days with a meds authorizing provider and meeting all other requirements  Future Appointments  No visits were found meeting these conditions.  Showing future appointments within next 150 days with a meds authorizing provider and meeting all other requirements

## 2024-10-08 ENCOUNTER — HOSPITAL ENCOUNTER (OUTPATIENT)
Age: 69
Discharge: HOME | End: 2024-10-08
Payer: MEDICARE

## 2024-10-08 DIAGNOSIS — E03.9 ACQUIRED HYPOTHYROIDISM: ICD-10-CM

## 2024-10-08 LAB
T4 FREE SERPL-MCNC: 1.47 NG/DL (ref 0.93–1.68)
TSH SERPL DL<=0.005 MIU/L-ACNC: 0.19 UIU/ML (ref 0.4–4.2)

## 2024-10-08 PROCEDURE — 36415 COLL VENOUS BLD VENIPUNCTURE: CPT

## 2024-10-08 PROCEDURE — 80061 LIPID PANEL: CPT

## 2024-10-08 PROCEDURE — 84443 ASSAY THYROID STIM HORMONE: CPT

## 2024-10-08 PROCEDURE — 84402 ASSAY OF FREE TESTOSTERONE: CPT

## 2024-10-08 PROCEDURE — 84439 ASSAY OF FREE THYROXINE: CPT

## 2024-10-08 PROCEDURE — 84403 ASSAY OF TOTAL TESTOSTERONE: CPT

## 2024-10-08 PROCEDURE — 84270 ASSAY OF SEX HORMONE GLOBUL: CPT

## 2024-10-09 LAB
CHOLEST SERPL-MCNC: 237 MG/DL (ref 100–199)
HDLC SERPL-MCNC: 57 MG/DL
LDLC SERPL CALC-MCNC: 153 MG/DL
TRIGL SERPL-MCNC: 134 MG/DL (ref 0–199)

## 2024-10-11 ENCOUNTER — CLINICAL DOCUMENTATION (OUTPATIENT)
Age: 69
End: 2024-10-11

## 2024-10-11 DIAGNOSIS — E03.9 ACQUIRED HYPOTHYROIDISM: Primary | ICD-10-CM

## 2024-10-11 NOTE — PROGRESS NOTES
Labs discussed with patient.  Change Synthroid to 1 tablet daily.  Patient is currently taking 1 tablet 6 days and 1.5 tablets once a week.  Synthroid dose is 75 mcg.  Levels to be repeated 1 week before next visit.

## 2024-10-13 LAB — TESTOSTERONE, FREE W SHGB, FEMALES/CHILDREN: NORMAL

## 2024-10-15 ENCOUNTER — TELEPHONE (OUTPATIENT)
Age: 69
End: 2024-10-15

## 2024-10-15 DIAGNOSIS — E03.9 ACQUIRED HYPOTHYROIDISM: ICD-10-CM

## 2024-10-15 NOTE — TELEPHONE ENCOUNTER
----- Message from Dr. Aroldo Liao MD sent at 10/11/2024 12:13 PM EDT -----  Labs discussed with patient.  Change Synthroid to 1 tablet daily.  Patient is currently taking 1 tablet 6 days and 1.5 tablets once a week.  Synthroid dose is 75 mcg.  Levels to be repeated 1 week before next visit.

## 2024-10-18 DIAGNOSIS — E03.9 ACQUIRED HYPOTHYROIDISM: ICD-10-CM

## 2024-10-18 RX ORDER — LEVOTHYROXINE SODIUM 75 UG/1
75 TABLET ORAL DAILY
Qty: 30 TABLET | Refills: 0 | Status: SHIPPED | OUTPATIENT
Start: 2024-10-18

## 2024-10-21 RX ORDER — LEVOTHYROXINE SODIUM 75 UG/1
75 TABLET ORAL DAILY
Qty: 90 TABLET | OUTPATIENT
Start: 2024-10-21

## 2024-11-11 DIAGNOSIS — E03.9 ACQUIRED HYPOTHYROIDISM: ICD-10-CM

## 2024-11-13 RX ORDER — LEVOTHYROXINE SODIUM 75 UG/1
75 TABLET ORAL DAILY
Qty: 90 TABLET | Refills: 0 | Status: SHIPPED | OUTPATIENT
Start: 2024-11-13

## 2024-12-03 ENCOUNTER — TELEPHONE (OUTPATIENT)
Dept: FAMILY MEDICINE CLINIC | Age: 69
End: 2024-12-03

## 2024-12-03 NOTE — TELEPHONE ENCOUNTER
Pt states she is currently seeing Dr. Hernández and cannot get into [facility] till January of 2025! Is there anyone else you recommend?

## 2024-12-03 NOTE — TELEPHONE ENCOUNTER
I use Dr Bates's office, Dr Forbes's office and Dr Ramos's office. I can happily refer to one of them, but can't guarantee they'll be able to see her sooner.

## 2024-12-03 NOTE — TELEPHONE ENCOUNTER
I had informed pt of these Drs. and stated she will do some research on each and give us a call back tomorrow.

## 2024-12-03 NOTE — TELEPHONE ENCOUNTER
Has she been seeing Dr Hernández? (That's who it looks like she is currently seeing, best I can tell) If so, I would recommend Dr Pretty. He is also out at [facility].

## 2024-12-03 NOTE — TELEPHONE ENCOUNTER
Pt called and stated that her foot and ankle DrSylvia Is retireing and would like to know if there is another one that Leonardo suggests. The one that is replacing that doctor in the office, is booking a couple weeks out.     Would like to know if there is someone else she should go to   Okay to leave detailed message

## 2024-12-05 ENCOUNTER — HOSPITAL ENCOUNTER (OUTPATIENT)
Dept: ULTRASOUND IMAGING | Age: 69
Discharge: HOME | End: 2024-12-05
Attending: OTOLARYNGOLOGY
Payer: MEDICARE

## 2024-12-05 DIAGNOSIS — R59.0 CERVICAL LYMPHADENOPATHY: ICD-10-CM

## 2024-12-05 PROCEDURE — 76536 US EXAM OF HEAD AND NECK: CPT

## 2024-12-14 ENCOUNTER — HOSPITAL ENCOUNTER (OUTPATIENT)
Age: 69
Discharge: HOME | End: 2024-12-14
Payer: MEDICARE

## 2024-12-14 DIAGNOSIS — E03.9 ACQUIRED HYPOTHYROIDISM: ICD-10-CM

## 2024-12-14 LAB
T4 FREE SERPL-MCNC: 1.21 NG/DL (ref 0.93–1.68)
TSH SERPL DL<=0.005 MIU/L-ACNC: 0.89 UIU/ML (ref 0.4–4.2)

## 2024-12-14 PROCEDURE — 36415 COLL VENOUS BLD VENIPUNCTURE: CPT

## 2024-12-14 PROCEDURE — 84443 ASSAY THYROID STIM HORMONE: CPT

## 2024-12-14 PROCEDURE — 84439 ASSAY OF FREE THYROXINE: CPT

## 2024-12-17 ENCOUNTER — TRANSCRIBE ORDERS (OUTPATIENT)
Dept: ADMINISTRATIVE | Age: 69
End: 2024-12-17

## 2024-12-17 ENCOUNTER — OFFICE VISIT (OUTPATIENT)
Age: 69
End: 2024-12-17
Payer: MEDICARE

## 2024-12-17 VITALS
BODY MASS INDEX: 21.07 KG/M2 | HEIGHT: 68 IN | HEART RATE: 56 BPM | SYSTOLIC BLOOD PRESSURE: 108 MMHG | DIASTOLIC BLOOD PRESSURE: 72 MMHG | WEIGHT: 139 LBS

## 2024-12-17 DIAGNOSIS — E03.9 ACQUIRED HYPOTHYROIDISM: Primary | ICD-10-CM

## 2024-12-17 DIAGNOSIS — M84.463A: Primary | ICD-10-CM

## 2024-12-17 DIAGNOSIS — E03.9 ACQUIRED HYPOTHYROIDISM: ICD-10-CM

## 2024-12-17 PROCEDURE — 99213 OFFICE O/P EST LOW 20 MIN: CPT | Performed by: INTERNAL MEDICINE

## 2024-12-17 PROCEDURE — G8399 PT W/DXA RESULTS DOCUMENT: HCPCS | Performed by: INTERNAL MEDICINE

## 2024-12-17 PROCEDURE — 1159F MED LIST DOCD IN RCRD: CPT | Performed by: INTERNAL MEDICINE

## 2024-12-17 PROCEDURE — G8427 DOCREV CUR MEDS BY ELIG CLIN: HCPCS | Performed by: INTERNAL MEDICINE

## 2024-12-17 PROCEDURE — G8484 FLU IMMUNIZE NO ADMIN: HCPCS | Performed by: INTERNAL MEDICINE

## 2024-12-17 PROCEDURE — 1036F TOBACCO NON-USER: CPT | Performed by: INTERNAL MEDICINE

## 2024-12-17 PROCEDURE — 1123F ACP DISCUSS/DSCN MKR DOCD: CPT | Performed by: INTERNAL MEDICINE

## 2024-12-17 PROCEDURE — 1090F PRES/ABSN URINE INCON ASSESS: CPT | Performed by: INTERNAL MEDICINE

## 2024-12-17 PROCEDURE — 1160F RVW MEDS BY RX/DR IN RCRD: CPT | Performed by: INTERNAL MEDICINE

## 2024-12-17 PROCEDURE — G8420 CALC BMI NORM PARAMETERS: HCPCS | Performed by: INTERNAL MEDICINE

## 2024-12-17 PROCEDURE — 3017F COLORECTAL CA SCREEN DOC REV: CPT | Performed by: INTERNAL MEDICINE

## 2024-12-17 RX ORDER — LEVOTHYROXINE SODIUM 75 UG/1
75 TABLET ORAL DAILY
Qty: 90 TABLET | Refills: 0 | OUTPATIENT
Start: 2024-12-17

## 2024-12-17 RX ORDER — LEVOTHYROXINE SODIUM 75 UG/1
75 TABLET ORAL DAILY
Qty: 90 TABLET | Refills: 1 | Status: SHIPPED | OUTPATIENT
Start: 2024-12-17

## 2024-12-17 NOTE — PROGRESS NOTES
normal, conjunctivae and sclerae normal, corneas clear and pupils equal, round, reactive to light and accomodation  Neck:no adenopathy, no carotid bruit, no JVD and supple, symmetrical, trachea midline  Thyroid: There is no goiter or thyroid tenderness.  Lung:clear to auscultation bilaterally  Heart: regular rate and rhythm, S1, S2 normal, no murmur, click, rub or gallop and normal apical impulse  Abdomen:soft, non-tender; bowel sounds normal; no masses,  no organomegaly  Extremities:extremities normal, atraumatic, no cyanosis or edema and Homans sign is negative, no sign of DVT  Pulses:2+ and symmetric  Skin:warm and dry, no hyperpigmentation, vitiligo, or suspicious lesions  Neuro:normal without focal findings, mental status, speech normal, alert and oriented x3, GERI, cranial nerves 2-12 intact, muscle tone and strength normal and symmetric.  Lymph nodes: There is no cervical, supraclavicular or submental adenopathy.  Musculoskeletal:  No joint swelling or deformity.  Psychiatry: Alert and oriented ×3.  Making good eye contact.  Affect is normal.      Assessment/Plan:   Diagnosis Orders   1. Acquired hypothyroidism  Currently with good biochemical control.  I would like to reassess labs in 6 months to ensure stability.  Continue with 75 mcg of Synthroid daily.  We had a lengthy discussion regarding hair loss and other symptoms which she is experiencing but at this point this symptoms are not thyroid related since she is euthyroid.                  Orders Placed This Encounter   Procedures    TSH     Standing Status:   Future     Standing Expiration Date:   6/17/2025    T4, Free     Standing Status:   Future     Standing Expiration Date:   6/17/2025       The risks and benefits of my recommendations, as well as other treatment options were discussed with the patient today. Questions were answered.  Follow up: 6 months and as needed.         Electronically signed by Aroldo Liao MD 12/17/2024 4:18 PM     **This

## 2025-01-15 ENCOUNTER — HOSPITAL ENCOUNTER (OUTPATIENT)
Dept: MRI IMAGING | Age: 70
Discharge: HOME | End: 2025-01-15
Attending: PODIATRIST
Payer: MEDICARE

## 2025-01-15 DIAGNOSIS — M66.371 SPONTANEOUS RUPTURE OF FLEXOR TENDONS, RIGHT ANKLE AND FOOT: ICD-10-CM

## 2025-01-15 DIAGNOSIS — M84.463A: ICD-10-CM

## 2025-01-15 PROCEDURE — 73718 MRI LOWER EXTREMITY W/O DYE: CPT

## 2025-01-15 PROCEDURE — 73721 MRI JNT OF LWR EXTRE W/O DYE: CPT

## 2025-01-16 ENCOUNTER — HOSPITAL ENCOUNTER (OUTPATIENT)
Dept: PULMONOLOGY | Age: 70
Discharge: HOME | End: 2025-01-16
Payer: MEDICARE

## 2025-01-16 DIAGNOSIS — R94.2 ABNORMAL RESULTS OF PULMONARY FUNCTION STUDIES: ICD-10-CM

## 2025-01-16 PROCEDURE — 94060 EVALUATION OF WHEEZING: CPT

## 2025-01-16 PROCEDURE — 94729 DIFFUSING CAPACITY: CPT

## 2025-01-16 PROCEDURE — 94726 PLETHYSMOGRAPHY LUNG VOLUMES: CPT

## 2025-02-20 DIAGNOSIS — E03.9 ACQUIRED HYPOTHYROIDISM: ICD-10-CM

## 2025-02-24 RX ORDER — LEVOTHYROXINE SODIUM 75 UG/1
75 TABLET ORAL DAILY
Qty: 30 TABLET | Refills: 3 | Status: SHIPPED | OUTPATIENT
Start: 2025-02-24

## 2025-04-04 NOTE — TELEPHONE ENCOUNTER
LMOM for pt to return our call at the earliest convenience. Labs mailed to pt's home address. Two to three times per week

## 2025-04-16 ENCOUNTER — TRANSCRIBE ORDERS (OUTPATIENT)
Dept: ADMINISTRATIVE | Age: 70
End: 2025-04-16

## 2025-04-16 DIAGNOSIS — S86.211S: Primary | ICD-10-CM

## 2025-04-22 ENCOUNTER — TRANSCRIBE ORDERS (OUTPATIENT)
Dept: ADMINISTRATIVE | Age: 70
End: 2025-04-22

## 2025-04-22 DIAGNOSIS — M84.363G STRESS FRACTURE OF RIGHT FIBULA WITH DELAYED HEALING, SUBSEQUENT ENCOUNTER: Primary | ICD-10-CM

## 2025-05-05 ENCOUNTER — HOSPITAL ENCOUNTER (OUTPATIENT)
Dept: NUCLEAR MEDICINE | Age: 70
Discharge: HOME | End: 2025-05-05
Attending: PODIATRIST
Payer: MEDICARE

## 2025-05-05 DIAGNOSIS — M84.363G STRESS FRACTURE OF RIGHT FIBULA WITH DELAYED HEALING, SUBSEQUENT ENCOUNTER: ICD-10-CM

## 2025-05-05 PROCEDURE — 78315 BONE IMAGING 3 PHASE: CPT

## 2025-05-05 PROCEDURE — A9503 TC99M MEDRONATE: HCPCS | Performed by: PODIATRIST

## 2025-05-05 PROCEDURE — 3430000000 HC RX DIAGNOSTIC RADIOPHARMACEUTICAL: Performed by: PODIATRIST

## 2025-05-05 RX ORDER — TC 99M MEDRONATE 20 MG/10ML
25.4 INJECTION, POWDER, LYOPHILIZED, FOR SOLUTION INTRAVENOUS
Status: COMPLETED | OUTPATIENT
Start: 2025-05-05 | End: 2025-05-05

## 2025-05-05 RX ADMIN — TC 99M MEDRONATE 25.4 MILLICURIE: 20 INJECTION, POWDER, LYOPHILIZED, FOR SOLUTION INTRAVENOUS at 12:10

## 2025-06-11 ENCOUNTER — TELEPHONE (OUTPATIENT)
Age: 70
End: 2025-06-11

## 2025-06-11 NOTE — TELEPHONE ENCOUNTER
Called and left VM for patient regarding upcoming appt on 06/17. Lab are still active was wondering if patient got these done or planning to get done.

## 2025-06-16 ENCOUNTER — CLINICAL DOCUMENTATION (OUTPATIENT)
Age: 70
End: 2025-06-16

## 2025-06-16 ENCOUNTER — TELEPHONE (OUTPATIENT)
Age: 70
End: 2025-06-16

## 2025-06-16 DIAGNOSIS — E03.9 ACQUIRED HYPOTHYROIDISM: Primary | ICD-10-CM

## 2025-06-16 NOTE — TELEPHONE ENCOUNTER
Patient is requesting a new lab order, the one current one expires 6/17/25. Please follow up with patient.

## 2025-06-28 ENCOUNTER — HOSPITAL ENCOUNTER (OUTPATIENT)
Age: 70
Discharge: HOME | End: 2025-06-28
Payer: MEDICARE

## 2025-06-28 DIAGNOSIS — E03.9 ACQUIRED HYPOTHYROIDISM: ICD-10-CM

## 2025-06-28 LAB
T4 FREE SERPL-MCNC: 1.2 NG/DL (ref 0.92–1.68)
TSH SERPL DL<=0.05 MIU/L-ACNC: 1.98 UIU/ML (ref 0.27–4.2)

## 2025-06-28 PROCEDURE — 84443 ASSAY THYROID STIM HORMONE: CPT

## 2025-06-28 PROCEDURE — 36415 COLL VENOUS BLD VENIPUNCTURE: CPT

## 2025-06-28 PROCEDURE — 84439 ASSAY OF FREE THYROXINE: CPT

## 2025-07-06 ENCOUNTER — RESULTS FOLLOW-UP (OUTPATIENT)
Age: 70
End: 2025-07-06

## 2025-07-29 ENCOUNTER — OFFICE VISIT (OUTPATIENT)
Dept: FAMILY MEDICINE CLINIC | Age: 70
End: 2025-07-29
Payer: MEDICARE

## 2025-07-29 VITALS
BODY MASS INDEX: 20.4 KG/M2 | DIASTOLIC BLOOD PRESSURE: 72 MMHG | SYSTOLIC BLOOD PRESSURE: 108 MMHG | RESPIRATION RATE: 14 BRPM | HEIGHT: 68 IN | WEIGHT: 134.6 LBS | TEMPERATURE: 97.6 F | HEART RATE: 73 BPM | OXYGEN SATURATION: 97 %

## 2025-07-29 DIAGNOSIS — J44.9 CHRONIC OBSTRUCTIVE PULMONARY DISEASE, UNSPECIFIED COPD TYPE (HCC): ICD-10-CM

## 2025-07-29 DIAGNOSIS — Z00.00 INITIAL MEDICARE ANNUAL WELLNESS VISIT: Primary | ICD-10-CM

## 2025-07-29 DIAGNOSIS — F33.1 MAJOR DEPRESSIVE DISORDER, RECURRENT EPISODE, MODERATE (HCC): ICD-10-CM

## 2025-07-29 DIAGNOSIS — M79.89 MASS OF SOFT TISSUE OF NECK: ICD-10-CM

## 2025-07-29 PROCEDURE — 1159F MED LIST DOCD IN RCRD: CPT | Performed by: NURSE PRACTITIONER

## 2025-07-29 PROCEDURE — 3017F COLORECTAL CA SCREEN DOC REV: CPT | Performed by: NURSE PRACTITIONER

## 2025-07-29 PROCEDURE — 1123F ACP DISCUSS/DSCN MKR DOCD: CPT | Performed by: NURSE PRACTITIONER

## 2025-07-29 PROCEDURE — G0438 PPPS, INITIAL VISIT: HCPCS | Performed by: NURSE PRACTITIONER

## 2025-07-29 SDOH — ECONOMIC STABILITY: FOOD INSECURITY: WITHIN THE PAST 12 MONTHS, YOU WORRIED THAT YOUR FOOD WOULD RUN OUT BEFORE YOU GOT MONEY TO BUY MORE.: NEVER TRUE

## 2025-07-29 SDOH — ECONOMIC STABILITY: FOOD INSECURITY: WITHIN THE PAST 12 MONTHS, THE FOOD YOU BOUGHT JUST DIDN'T LAST AND YOU DIDN'T HAVE MONEY TO GET MORE.: NEVER TRUE

## 2025-07-29 ASSESSMENT — PATIENT HEALTH QUESTIONNAIRE - PHQ9
4. FEELING TIRED OR HAVING LITTLE ENERGY: MORE THAN HALF THE DAYS
2. FEELING DOWN, DEPRESSED OR HOPELESS: NOT AT ALL
3. TROUBLE FALLING OR STAYING ASLEEP: SEVERAL DAYS
9. THOUGHTS THAT YOU WOULD BE BETTER OFF DEAD, OR OF HURTING YOURSELF: NOT AT ALL
1. LITTLE INTEREST OR PLEASURE IN DOING THINGS: NOT AT ALL
7. TROUBLE CONCENTRATING ON THINGS, SUCH AS READING THE NEWSPAPER OR WATCHING TELEVISION: NOT AT ALL
6. FEELING BAD ABOUT YOURSELF - OR THAT YOU ARE A FAILURE OR HAVE LET YOURSELF OR YOUR FAMILY DOWN: NOT AT ALL
SUM OF ALL RESPONSES TO PHQ QUESTIONS 1-9: 3
SUM OF ALL RESPONSES TO PHQ QUESTIONS 1-9: 3
5. POOR APPETITE OR OVEREATING: NOT AT ALL
SUM OF ALL RESPONSES TO PHQ QUESTIONS 1-9: 3
SUM OF ALL RESPONSES TO PHQ QUESTIONS 1-9: 3
10. IF YOU CHECKED OFF ANY PROBLEMS, HOW DIFFICULT HAVE THESE PROBLEMS MADE IT FOR YOU TO DO YOUR WORK, TAKE CARE OF THINGS AT HOME, OR GET ALONG WITH OTHER PEOPLE: NOT DIFFICULT AT ALL
8. MOVING OR SPEAKING SO SLOWLY THAT OTHER PEOPLE COULD HAVE NOTICED. OR THE OPPOSITE, BEING SO FIGETY OR RESTLESS THAT YOU HAVE BEEN MOVING AROUND A LOT MORE THAN USUAL: NOT AT ALL

## 2025-07-29 NOTE — PROGRESS NOTES
Medicare Annual Wellness Visit    Elysia Buckner is here for Medicare AWV    Assessment & Plan   Initial Medicare annual wellness visit  Mass of soft tissue of neck  -     US HEAD NECK SOFT TISSUE; Future  Chronic obstructive pulmonary disease, unspecified COPD type (HCC)  Major depressive disorder, recurrent episode, moderate (HCC)     - obtain repeat US neck/soft tissue  - moods stable, monitor  - following with pulmonologist in [place], takes Ivermectin    Return in 1 year (on 7/29/2026) for Medicare AWV.     Subjective   The following acute and/or chronic problems were also addressed today:  Noticed a lump on the left side of neck for about 1 month  Denies any pain  Reports some difficulty swallowing today, but otherwise ok    Patient's complete Health Risk Assessment and screening values have been reviewed and are found in Flowsheets. The following problems were reviewed today and where indicated follow up appointments were made and/or referrals ordered.    No Positive Risk Factors identified today.    Fall Risk:  Do you feel unsteady or are you worried about falling? : (!) yes  2 or more falls in past year?: no  Fall with injury in past year?: no                         PROCEDURE: US HEAD NECK SOFT TISSUE THYROID 12/5/2024     CLINICAL INFORMATION: Localized enlarged lymph nodes     COMPARISON: 12/29/2023, 10/3/2022     TECHNIQUE: Grayscale and color sonographic imaging of the thyroid gland  performed in longitudinal and transverse planes.       FINDINGS:      THYROID SIZE:     The right thyroid lobe measures 3.7 x 1.7 x 1.2 cm.     The left thyroid lobe measures 4.1 x 1.9 x 1.4 cm.     The isthmus measures 2 mm     ECHOTEXTURE: Markedly heterogeneous.     RIGHT LOBE NODULES: None     LEFT LOBE NODULES: None     ISTHMUS NODULES: None     CERVICAL LYMPH NODES:   1. A 1 x 0.6 x 0.3 cm lymph node with echogenic hilum in the right neck.  2. A 1.8 x 0.8 x 0.4 cm lymph node with echogenic hilum is seen in the left

## 2025-07-30 ENCOUNTER — TRANSCRIBE ORDERS (OUTPATIENT)
Dept: ADMINISTRATIVE | Age: 70
End: 2025-07-30

## 2025-07-30 DIAGNOSIS — M84.363G: Primary | ICD-10-CM

## 2025-08-01 ENCOUNTER — TRANSCRIBE ORDERS (OUTPATIENT)
Dept: ADMINISTRATIVE | Age: 70
End: 2025-08-01

## 2025-08-01 ENCOUNTER — HOSPITAL ENCOUNTER (OUTPATIENT)
Dept: ULTRASOUND IMAGING | Age: 70
Discharge: HOME | End: 2025-08-01
Payer: MEDICARE

## 2025-08-01 DIAGNOSIS — M79.89 MASS OF SOFT TISSUE OF NECK: ICD-10-CM

## 2025-08-01 DIAGNOSIS — M84.363G: Primary | ICD-10-CM

## 2025-08-01 DIAGNOSIS — M76.71 PERONEAL TENDINITIS OF RIGHT LOWER EXTREMITY: Primary | ICD-10-CM

## 2025-08-01 PROCEDURE — 76536 US EXAM OF HEAD AND NECK: CPT

## 2025-08-05 ENCOUNTER — TELEPHONE (OUTPATIENT)
Dept: FAMILY MEDICINE CLINIC | Age: 70
End: 2025-08-05

## 2025-08-08 ENCOUNTER — TELEPHONE (OUTPATIENT)
Dept: ENT CLINIC | Age: 70
End: 2025-08-08

## 2025-08-15 ENCOUNTER — HOSPITAL ENCOUNTER (OUTPATIENT)
Dept: MRI IMAGING | Age: 70
Discharge: HOME | End: 2025-08-15
Attending: PODIATRIST
Payer: MEDICARE

## 2025-08-15 DIAGNOSIS — M76.71 PERONEAL TENDINITIS OF RIGHT LOWER EXTREMITY: ICD-10-CM

## 2025-08-15 PROCEDURE — 73721 MRI JNT OF LWR EXTRE W/O DYE: CPT

## 2025-08-19 DIAGNOSIS — E03.9 ACQUIRED HYPOTHYROIDISM: ICD-10-CM

## 2025-08-21 RX ORDER — LEVOTHYROXINE SODIUM 75 UG/1
75 TABLET ORAL DAILY
Qty: 90 TABLET | Refills: 0 | Status: SHIPPED | OUTPATIENT
Start: 2025-08-21

## (undated) DEVICE — SET LNR RED GRN W/ BASE CLEANASCOPE

## (undated) DEVICE — ENDO KIT: Brand: MEDLINE INDUSTRIES, INC.

## (undated) DEVICE — NEEDLE SCLERO 23GA L4MM CATH L240CM CNTRST SHTH DIA1.8MM

## (undated) DEVICE — TRAP POLYP ETRAP

## (undated) DEVICE — CONNECTOR TBNG AUX H2O JET DISP FOR OLY 160/180 SER

## (undated) DEVICE — SET ADMIN 25ML L117IN PMP MOD CK VLV RLER CLMP 2 SMRTSITE

## (undated) DEVICE — TUBING IV STOPCOCK 48 CM 3 W

## (undated) DEVICE — SOLUTION IV 1000ML 0.45% SOD CHL PH 5 INJ USP VIAFLX PLAS

## (undated) DEVICE — IV START KIT: Brand: MEDLINE INDUSTRIES, INC.

## (undated) DEVICE — GLOVE ORTHO 8   MSG9480

## (undated) DEVICE — CATHETER ETER IV 22GA L1IN POLYUR STR RADPQ INTROCAN SFTY

## (undated) DEVICE — SNARE POLYP SM W13MMXL240CM SHTH DIA2.4MM OVL FLX DISP

## (undated) DEVICE — FORCEPS BX L240CM JAW DIA3.2MM L CAP W/ NDL MIC MESH TOOTH

## (undated) DEVICE — LINER SUCT CANSTR 1500CC SEMI RIG W/ POR HYDROPHOBIC SHUT